# Patient Record
Sex: FEMALE | Race: WHITE | NOT HISPANIC OR LATINO | Employment: OTHER | URBAN - METROPOLITAN AREA
[De-identification: names, ages, dates, MRNs, and addresses within clinical notes are randomized per-mention and may not be internally consistent; named-entity substitution may affect disease eponyms.]

---

## 2018-10-10 ENCOUNTER — TRANSFERRED RECORDS (OUTPATIENT)
Dept: HEALTH INFORMATION MANAGEMENT | Facility: CLINIC | Age: 78
End: 2018-10-10

## 2018-10-16 RX ORDER — PRAVASTATIN SODIUM 40 MG
40 TABLET ORAL EVERY EVENING
COMMUNITY

## 2018-10-16 RX ORDER — FAMOTIDINE 20 MG
1000 TABLET ORAL DAILY
COMMUNITY

## 2018-10-16 RX ORDER — OMEGA-3/DHA/EPA/FISH OIL 60 MG-90MG
500 CAPSULE ORAL 2 TIMES DAILY
COMMUNITY

## 2018-10-16 RX ORDER — TRIAMCINOLONE ACETONIDE 1 MG/G
CREAM TOPICAL 3 TIMES DAILY PRN
COMMUNITY

## 2018-10-16 RX ORDER — LOSARTAN POTASSIUM AND HYDROCHLOROTHIAZIDE 25; 100 MG/1; MG/1
1 TABLET ORAL DAILY
COMMUNITY
End: 2024-03-04

## 2018-10-19 ENCOUNTER — ANESTHESIA EVENT (OUTPATIENT)
Dept: SURGERY | Facility: CLINIC | Age: 78
End: 2018-10-19
Payer: COMMERCIAL

## 2018-10-19 ENCOUNTER — HOSPITAL ENCOUNTER (OUTPATIENT)
Facility: CLINIC | Age: 78
Discharge: HOME | End: 2018-10-20
Attending: UROLOGY | Admitting: UROLOGY
Payer: COMMERCIAL

## 2018-10-19 ENCOUNTER — ANESTHESIA (OUTPATIENT)
Dept: SURGERY | Facility: CLINIC | Age: 78
End: 2018-10-19
Payer: COMMERCIAL

## 2018-10-19 DIAGNOSIS — N81.4 CYSTOCELE WITH PROLAPSE: Primary | ICD-10-CM

## 2018-10-19 PROBLEM — Z00.00 MEDICARE ANNUAL WELLNESS VISIT, SUBSEQUENT: Status: ACTIVE | Noted: 2017-12-21

## 2018-10-19 PROBLEM — Z12.31 SCREENING MAMMOGRAM, ENCOUNTER FOR: Status: ACTIVE | Noted: 2017-12-21

## 2018-10-19 PROBLEM — G47.33 OBSTRUCTIVE SLEEP APNEA SYNDROME: Status: ACTIVE | Noted: 2018-10-10

## 2018-10-19 PROBLEM — Z78.0 POSTMENOPAUSAL: Status: ACTIVE | Noted: 2017-12-21

## 2018-10-19 LAB
CREAT SERPL-MCNC: 0.94 MG/DL (ref 0.52–1.04)
GFR SERPL CREATININE-BSD FRML MDRD: 58 ML/MIN/1.7M2
GLUCOSE BLDC GLUCOMTR-MCNC: 117 MG/DL (ref 70–99)
GLUCOSE BLDC GLUCOMTR-MCNC: 123 MG/DL (ref 70–99)
GLUCOSE SERPL-MCNC: 109 MG/DL (ref 70–99)
POTASSIUM SERPL-SCNC: 3.7 MMOL/L (ref 3.4–5.3)

## 2018-10-19 PROCEDURE — 25000132 ZZH RX MED GY IP 250 OP 250 PS 637: Performed by: UROLOGY

## 2018-10-19 PROCEDURE — 84132 ASSAY OF SERUM POTASSIUM: CPT | Performed by: ANESTHESIOLOGY

## 2018-10-19 PROCEDURE — 71000012 ZZH RECOVERY PHASE 1 LEVEL 1 FIRST HR: Performed by: OBSTETRICS & GYNECOLOGY

## 2018-10-19 PROCEDURE — 37000009 ZZH ANESTHESIA TECHNICAL FEE, EACH ADDTL 15 MIN: Performed by: OBSTETRICS & GYNECOLOGY

## 2018-10-19 PROCEDURE — 36000087 ZZH SURGERY LEVEL 8 EA 15 ADDTL MIN: Performed by: OBSTETRICS & GYNECOLOGY

## 2018-10-19 PROCEDURE — 25800025 ZZH RX 258: Performed by: UROLOGY

## 2018-10-19 PROCEDURE — 71000013 ZZH RECOVERY PHASE 1 LEVEL 1 EA ADDTL HR: Performed by: OBSTETRICS & GYNECOLOGY

## 2018-10-19 PROCEDURE — 99207 ZZC CDG-HISTORY COMP: MEETS EXP. PROBLEM FOCUSED - DOWN CODED LACK OF HPI: CPT | Performed by: PHYSICIAN ASSISTANT

## 2018-10-19 PROCEDURE — 25000125 ZZHC RX 250: Performed by: UROLOGY

## 2018-10-19 PROCEDURE — 88307 TISSUE EXAM BY PATHOLOGIST: CPT | Mod: 26 | Performed by: OBSTETRICS & GYNECOLOGY

## 2018-10-19 PROCEDURE — 25000128 H RX IP 250 OP 636: Performed by: ANESTHESIOLOGY

## 2018-10-19 PROCEDURE — 37000008 ZZH ANESTHESIA TECHNICAL FEE, 1ST 30 MIN: Performed by: OBSTETRICS & GYNECOLOGY

## 2018-10-19 PROCEDURE — 93010 ELECTROCARDIOGRAM REPORT: CPT | Performed by: INTERNAL MEDICINE

## 2018-10-19 PROCEDURE — 93005 ELECTROCARDIOGRAM TRACING: CPT

## 2018-10-19 PROCEDURE — 25000125 ZZHC RX 250: Performed by: NURSE ANESTHETIST, CERTIFIED REGISTERED

## 2018-10-19 PROCEDURE — 25000128 H RX IP 250 OP 636: Performed by: NURSE ANESTHETIST, CERTIFIED REGISTERED

## 2018-10-19 PROCEDURE — 36415 COLL VENOUS BLD VENIPUNCTURE: CPT | Performed by: ANESTHESIOLOGY

## 2018-10-19 PROCEDURE — 25000128 H RX IP 250 OP 636: Performed by: UROLOGY

## 2018-10-19 PROCEDURE — 27210794 ZZH OR GENERAL SUPPLY STERILE: Performed by: OBSTETRICS & GYNECOLOGY

## 2018-10-19 PROCEDURE — 82962 GLUCOSE BLOOD TEST: CPT | Mod: 91

## 2018-10-19 PROCEDURE — 25000125 ZZHC RX 250: Performed by: ANESTHESIOLOGY

## 2018-10-19 PROCEDURE — 40000170 ZZH STATISTIC PRE-PROCEDURE ASSESSMENT II: Performed by: OBSTETRICS & GYNECOLOGY

## 2018-10-19 PROCEDURE — 88307 TISSUE EXAM BY PATHOLOGIST: CPT | Performed by: OBSTETRICS & GYNECOLOGY

## 2018-10-19 PROCEDURE — 40000065 ZZH STATISTIC EKG NON-CHARGEABLE

## 2018-10-19 PROCEDURE — 36000085 ZZH SURGERY LEVEL 8 1ST 30 MIN: Performed by: OBSTETRICS & GYNECOLOGY

## 2018-10-19 PROCEDURE — 25000132 ZZH RX MED GY IP 250 OP 250 PS 637: Performed by: PHYSICIAN ASSISTANT

## 2018-10-19 PROCEDURE — 99202 OFFICE O/P NEW SF 15 MIN: CPT | Performed by: PHYSICIAN ASSISTANT

## 2018-10-19 PROCEDURE — P9041 ALBUMIN (HUMAN),5%, 50ML: HCPCS | Performed by: NURSE ANESTHETIST, CERTIFIED REGISTERED

## 2018-10-19 PROCEDURE — 25800025 ZZH RX 258: Performed by: OBSTETRICS & GYNECOLOGY

## 2018-10-19 PROCEDURE — 82947 ASSAY GLUCOSE BLOOD QUANT: CPT | Mod: 91 | Performed by: ANESTHESIOLOGY

## 2018-10-19 PROCEDURE — C1781 MESH (IMPLANTABLE): HCPCS | Performed by: OBSTETRICS & GYNECOLOGY

## 2018-10-19 PROCEDURE — 82565 ASSAY OF CREATININE: CPT | Performed by: ANESTHESIOLOGY

## 2018-10-19 PROCEDURE — 25000566 ZZH SEVOFLURANE, EA 15 MIN: Performed by: OBSTETRICS & GYNECOLOGY

## 2018-10-19 PROCEDURE — 25000125 ZZHC RX 250: Performed by: OBSTETRICS & GYNECOLOGY

## 2018-10-19 PROCEDURE — 99207 ZZC CDG-CODE CATEGORY CHANGED: CPT | Performed by: PHYSICIAN ASSISTANT

## 2018-10-19 PROCEDURE — C1771 REP DEV, URINARY, W/SLING: HCPCS | Performed by: OBSTETRICS & GYNECOLOGY

## 2018-10-19 DEVICE — MESH SLING Y SHAPE RESTORELLE 24X4CM 501420: Type: IMPLANTABLE DEVICE | Site: PELVIS | Status: FUNCTIONAL

## 2018-10-19 DEVICE — MESH SLING OBTRYX-HALO M0068505000: Type: IMPLANTABLE DEVICE | Site: URETHRA | Status: FUNCTIONAL

## 2018-10-19 RX ORDER — LIDOCAINE 40 MG/G
CREAM TOPICAL
Status: DISCONTINUED | OUTPATIENT
Start: 2018-10-19 | End: 2018-10-20 | Stop reason: HOSPADM

## 2018-10-19 RX ORDER — CEFAZOLIN SODIUM 2 G/100ML
2 INJECTION, SOLUTION INTRAVENOUS
Status: DISCONTINUED | OUTPATIENT
Start: 2018-10-19 | End: 2018-10-19 | Stop reason: HOSPADM

## 2018-10-19 RX ORDER — FENTANYL CITRATE 50 UG/ML
INJECTION, SOLUTION INTRAMUSCULAR; INTRAVENOUS PRN
Status: DISCONTINUED | OUTPATIENT
Start: 2018-10-19 | End: 2018-10-19

## 2018-10-19 RX ORDER — ALBUTEROL SULFATE 0.83 MG/ML
2.5 SOLUTION RESPIRATORY (INHALATION)
Status: DISCONTINUED | OUTPATIENT
Start: 2018-10-19 | End: 2018-10-19 | Stop reason: HOSPADM

## 2018-10-19 RX ORDER — LIDOCAINE HYDROCHLORIDE 20 MG/ML
INJECTION, SOLUTION INFILTRATION; PERINEURAL PRN
Status: DISCONTINUED | OUTPATIENT
Start: 2018-10-19 | End: 2018-10-19

## 2018-10-19 RX ORDER — MAGNESIUM HYDROXIDE 1200 MG/15ML
LIQUID ORAL PRN
Status: DISCONTINUED | OUTPATIENT
Start: 2018-10-19 | End: 2018-10-19 | Stop reason: HOSPADM

## 2018-10-19 RX ORDER — SODIUM CHLORIDE, SODIUM LACTATE, POTASSIUM CHLORIDE, CALCIUM CHLORIDE 600; 310; 30; 20 MG/100ML; MG/100ML; MG/100ML; MG/100ML
INJECTION, SOLUTION INTRAVENOUS CONTINUOUS
Status: DISCONTINUED | OUTPATIENT
Start: 2018-10-19 | End: 2018-10-19 | Stop reason: HOSPADM

## 2018-10-19 RX ORDER — LOSARTAN POTASSIUM AND HYDROCHLOROTHIAZIDE 25; 100 MG/1; MG/1
1 TABLET ORAL DAILY
Status: DISCONTINUED | OUTPATIENT
Start: 2018-10-19 | End: 2018-10-20 | Stop reason: HOSPADM

## 2018-10-19 RX ORDER — DEXTROSE MONOHYDRATE 25 G/50ML
25-50 INJECTION, SOLUTION INTRAVENOUS
Status: DISCONTINUED | OUTPATIENT
Start: 2018-10-19 | End: 2018-10-20 | Stop reason: HOSPADM

## 2018-10-19 RX ORDER — ONDANSETRON 4 MG/1
4 TABLET, ORALLY DISINTEGRATING ORAL EVERY 6 HOURS PRN
Status: DISCONTINUED | OUTPATIENT
Start: 2018-10-19 | End: 2018-10-20 | Stop reason: HOSPADM

## 2018-10-19 RX ORDER — CIPROFLOXACIN 2 MG/ML
400 INJECTION, SOLUTION INTRAVENOUS EVERY 12 HOURS
Status: DISCONTINUED | OUTPATIENT
Start: 2018-10-19 | End: 2018-10-20 | Stop reason: HOSPADM

## 2018-10-19 RX ORDER — HYDROCODONE BITARTRATE AND ACETAMINOPHEN 5; 325 MG/1; MG/1
1 TABLET ORAL EVERY 4 HOURS PRN
Qty: 20 TABLET | Refills: 0 | Status: SHIPPED | OUTPATIENT
Start: 2018-10-19 | End: 2019-07-23

## 2018-10-19 RX ORDER — LIDOCAINE 40 MG/G
CREAM TOPICAL
Status: DISCONTINUED | OUTPATIENT
Start: 2018-10-19 | End: 2018-10-19 | Stop reason: HOSPADM

## 2018-10-19 RX ORDER — ASPIRIN 81 MG
100 TABLET, DELAYED RELEASE (ENTERIC COATED) ORAL 2 TIMES DAILY
Qty: 60 TABLET | Refills: 0 | Status: SHIPPED | OUTPATIENT
Start: 2018-10-19 | End: 2018-12-18

## 2018-10-19 RX ORDER — NALOXONE HYDROCHLORIDE 0.4 MG/ML
.1-.4 INJECTION, SOLUTION INTRAMUSCULAR; INTRAVENOUS; SUBCUTANEOUS
Status: DISCONTINUED | OUTPATIENT
Start: 2018-10-19 | End: 2018-10-19

## 2018-10-19 RX ORDER — ONDANSETRON 2 MG/ML
4 INJECTION INTRAMUSCULAR; INTRAVENOUS EVERY 6 HOURS PRN
Status: DISCONTINUED | OUTPATIENT
Start: 2018-10-19 | End: 2018-10-20 | Stop reason: HOSPADM

## 2018-10-19 RX ORDER — KETOROLAC TROMETHAMINE 15 MG/ML
15 INJECTION, SOLUTION INTRAMUSCULAR; INTRAVENOUS EVERY 6 HOURS PRN
Status: DISCONTINUED | OUTPATIENT
Start: 2018-10-19 | End: 2018-10-20 | Stop reason: HOSPADM

## 2018-10-19 RX ORDER — GLYCOPYRROLATE 0.2 MG/ML
INJECTION, SOLUTION INTRAMUSCULAR; INTRAVENOUS PRN
Status: DISCONTINUED | OUTPATIENT
Start: 2018-10-19 | End: 2018-10-19

## 2018-10-19 RX ORDER — MEPERIDINE HYDROCHLORIDE 25 MG/ML
12.5 INJECTION INTRAMUSCULAR; INTRAVENOUS; SUBCUTANEOUS EVERY 5 MIN PRN
Status: DISCONTINUED | OUTPATIENT
Start: 2018-10-19 | End: 2018-10-19 | Stop reason: HOSPADM

## 2018-10-19 RX ORDER — ALBUTEROL SULFATE 0.83 MG/ML
2.5 SOLUTION RESPIRATORY (INHALATION) EVERY 4 HOURS PRN
Status: DISCONTINUED | OUTPATIENT
Start: 2018-10-19 | End: 2018-10-19 | Stop reason: HOSPADM

## 2018-10-19 RX ORDER — HYDROMORPHONE HYDROCHLORIDE 1 MG/ML
.3-.5 INJECTION, SOLUTION INTRAMUSCULAR; INTRAVENOUS; SUBCUTANEOUS EVERY 5 MIN PRN
Status: DISCONTINUED | OUTPATIENT
Start: 2018-10-19 | End: 2018-10-19 | Stop reason: HOSPADM

## 2018-10-19 RX ORDER — HYDROCODONE BITARTRATE AND ACETAMINOPHEN 5; 325 MG/1; MG/1
1 TABLET ORAL EVERY 6 HOURS PRN
Status: DISCONTINUED | OUTPATIENT
Start: 2018-10-19 | End: 2018-10-20 | Stop reason: HOSPADM

## 2018-10-19 RX ORDER — BUPIVACAINE HYDROCHLORIDE AND EPINEPHRINE 2.5; 5 MG/ML; UG/ML
INJECTION, SOLUTION EPIDURAL; INFILTRATION; INTRACAUDAL; PERINEURAL PRN
Status: DISCONTINUED | OUTPATIENT
Start: 2018-10-19 | End: 2018-10-19 | Stop reason: HOSPADM

## 2018-10-19 RX ORDER — ONDANSETRON 4 MG/1
4 TABLET, ORALLY DISINTEGRATING ORAL EVERY 30 MIN PRN
Status: DISCONTINUED | OUTPATIENT
Start: 2018-10-19 | End: 2018-10-19 | Stop reason: HOSPADM

## 2018-10-19 RX ORDER — BUPIVACAINE HYDROCHLORIDE 2.5 MG/ML
INJECTION, SOLUTION INFILTRATION; PERINEURAL PRN
Status: DISCONTINUED | OUTPATIENT
Start: 2018-10-19 | End: 2018-10-19 | Stop reason: HOSPADM

## 2018-10-19 RX ORDER — AMLODIPINE BESYLATE 5 MG/1
5 TABLET ORAL DAILY
Status: DISCONTINUED | OUTPATIENT
Start: 2018-10-19 | End: 2018-10-20 | Stop reason: HOSPADM

## 2018-10-19 RX ORDER — ESTRADIOL 0.1 MG/G
CREAM VAGINAL
Qty: 42.5 G | Refills: 0 | Status: SHIPPED | OUTPATIENT
Start: 2018-10-19 | End: 2019-07-23

## 2018-10-19 RX ORDER — METFORMIN HCL 500 MG
500 TABLET, EXTENDED RELEASE 24 HR ORAL DAILY
Status: DISCONTINUED | OUTPATIENT
Start: 2018-10-20 | End: 2018-10-20 | Stop reason: HOSPADM

## 2018-10-19 RX ORDER — KETOROLAC TROMETHAMINE 15 MG/ML
15 INJECTION, SOLUTION INTRAMUSCULAR; INTRAVENOUS
Status: DISCONTINUED | OUTPATIENT
Start: 2018-10-19 | End: 2018-10-19 | Stop reason: HOSPADM

## 2018-10-19 RX ORDER — CEFAZOLIN SODIUM 1 G/3ML
1 INJECTION, POWDER, FOR SOLUTION INTRAMUSCULAR; INTRAVENOUS SEE ADMIN INSTRUCTIONS
Status: DISCONTINUED | OUTPATIENT
Start: 2018-10-19 | End: 2018-10-19

## 2018-10-19 RX ORDER — HYDROMORPHONE HYDROCHLORIDE 1 MG/ML
.3-.5 INJECTION, SOLUTION INTRAMUSCULAR; INTRAVENOUS; SUBCUTANEOUS
Status: DISCONTINUED | OUTPATIENT
Start: 2018-10-19 | End: 2018-10-20 | Stop reason: HOSPADM

## 2018-10-19 RX ORDER — CEFAZOLIN SODIUM 1 G/3ML
1 INJECTION, POWDER, FOR SOLUTION INTRAMUSCULAR; INTRAVENOUS SEE ADMIN INSTRUCTIONS
Status: DISCONTINUED | OUTPATIENT
Start: 2018-10-19 | End: 2018-10-19 | Stop reason: HOSPADM

## 2018-10-19 RX ORDER — CEFAZOLIN SODIUM 2 G/100ML
2 INJECTION, SOLUTION INTRAVENOUS
Status: COMPLETED | OUTPATIENT
Start: 2018-10-19 | End: 2018-10-19

## 2018-10-19 RX ORDER — NEOSTIGMINE METHYLSULFATE 1 MG/ML
VIAL (ML) INJECTION PRN
Status: DISCONTINUED | OUTPATIENT
Start: 2018-10-19 | End: 2018-10-19

## 2018-10-19 RX ORDER — NALOXONE HYDROCHLORIDE 0.4 MG/ML
.1-.4 INJECTION, SOLUTION INTRAMUSCULAR; INTRAVENOUS; SUBCUTANEOUS
Status: DISCONTINUED | OUTPATIENT
Start: 2018-10-19 | End: 2018-10-20 | Stop reason: HOSPADM

## 2018-10-19 RX ORDER — CEFAZOLIN SODIUM 1 G/3ML
1 INJECTION, POWDER, FOR SOLUTION INTRAMUSCULAR; INTRAVENOUS EVERY 8 HOURS
Status: COMPLETED | OUTPATIENT
Start: 2018-10-19 | End: 2018-10-20

## 2018-10-19 RX ORDER — FENTANYL CITRATE 50 UG/ML
25-50 INJECTION, SOLUTION INTRAMUSCULAR; INTRAVENOUS
Status: DISCONTINUED | OUTPATIENT
Start: 2018-10-19 | End: 2018-10-19 | Stop reason: HOSPADM

## 2018-10-19 RX ORDER — ALBUMIN, HUMAN INJ 5% 5 %
SOLUTION INTRAVENOUS CONTINUOUS PRN
Status: DISCONTINUED | OUTPATIENT
Start: 2018-10-19 | End: 2018-10-19

## 2018-10-19 RX ORDER — NICOTINE POLACRILEX 4 MG
15-30 LOZENGE BUCCAL
Status: DISCONTINUED | OUTPATIENT
Start: 2018-10-19 | End: 2018-10-20 | Stop reason: HOSPADM

## 2018-10-19 RX ORDER — PROPOFOL 10 MG/ML
INJECTION, EMULSION INTRAVENOUS CONTINUOUS PRN
Status: DISCONTINUED | OUTPATIENT
Start: 2018-10-19 | End: 2018-10-19

## 2018-10-19 RX ORDER — ONDANSETRON 2 MG/ML
4 INJECTION INTRAMUSCULAR; INTRAVENOUS EVERY 30 MIN PRN
Status: DISCONTINUED | OUTPATIENT
Start: 2018-10-19 | End: 2018-10-19 | Stop reason: HOSPADM

## 2018-10-19 RX ORDER — CIPROFLOXACIN 500 MG/1
500 TABLET, FILM COATED ORAL 2 TIMES DAILY
Qty: 8 TABLET | Refills: 0 | Status: SHIPPED | OUTPATIENT
Start: 2018-10-19 | End: 2018-10-23

## 2018-10-19 RX ORDER — PHENAZOPYRIDINE HYDROCHLORIDE 200 MG/1
200 TABLET, FILM COATED ORAL ONCE
Status: COMPLETED | OUTPATIENT
Start: 2018-10-19 | End: 2018-10-19

## 2018-10-19 RX ORDER — PROPOFOL 10 MG/ML
INJECTION, EMULSION INTRAVENOUS PRN
Status: DISCONTINUED | OUTPATIENT
Start: 2018-10-19 | End: 2018-10-19

## 2018-10-19 RX ORDER — LORAZEPAM 2 MG/ML
.5-1 INJECTION INTRAMUSCULAR
Status: DISCONTINUED | OUTPATIENT
Start: 2018-10-19 | End: 2018-10-19 | Stop reason: HOSPADM

## 2018-10-19 RX ORDER — ONDANSETRON 2 MG/ML
INJECTION INTRAMUSCULAR; INTRAVENOUS PRN
Status: DISCONTINUED | OUTPATIENT
Start: 2018-10-19 | End: 2018-10-19

## 2018-10-19 RX ORDER — SODIUM CHLORIDE 9 MG/ML
INJECTION, SOLUTION INTRAVENOUS CONTINUOUS
Status: DISCONTINUED | OUTPATIENT
Start: 2018-10-19 | End: 2018-10-20 | Stop reason: HOSPADM

## 2018-10-19 RX ORDER — SPIRONOLACTONE 25 MG/1
25 TABLET ORAL DAILY
Status: DISCONTINUED | OUTPATIENT
Start: 2018-10-19 | End: 2018-10-20 | Stop reason: HOSPADM

## 2018-10-19 RX ADMIN — ROCURONIUM BROMIDE 10 MG: 10 INJECTION INTRAVENOUS at 15:17

## 2018-10-19 RX ADMIN — GLYCOPYRROLATE 0.3 MG: 0.2 INJECTION, SOLUTION INTRAMUSCULAR; INTRAVENOUS at 14:09

## 2018-10-19 RX ADMIN — FENTANYL CITRATE 50 MCG: 50 INJECTION, SOLUTION INTRAMUSCULAR; INTRAVENOUS at 13:47

## 2018-10-19 RX ADMIN — MEPERIDINE HYDROCHLORIDE 12.5 MG: 25 INJECTION INTRAMUSCULAR; INTRAVENOUS; SUBCUTANEOUS at 17:25

## 2018-10-19 RX ADMIN — LOSARTAN POTASSIUM AND HYDROCHLOROTHIAZIDE 1 TABLET: 100; 25 TABLET, FILM COATED ORAL at 21:10

## 2018-10-19 RX ADMIN — HYDROMORPHONE HYDROCHLORIDE 0.25 MG: 1 INJECTION, SOLUTION INTRAMUSCULAR; INTRAVENOUS; SUBCUTANEOUS at 15:16

## 2018-10-19 RX ADMIN — PHENYLEPHRINE HYDROCHLORIDE 100 MCG: 10 INJECTION, SOLUTION INTRAMUSCULAR; INTRAVENOUS; SUBCUTANEOUS at 13:45

## 2018-10-19 RX ADMIN — LIDOCAINE HYDROCHLORIDE 80 MG: 20 INJECTION, SOLUTION INFILTRATION; PERINEURAL at 13:18

## 2018-10-19 RX ADMIN — DEXMEDETOMIDINE HYDROCHLORIDE 12 MCG: 100 INJECTION, SOLUTION INTRAVENOUS at 13:53

## 2018-10-19 RX ADMIN — HYDROMORPHONE HYDROCHLORIDE 0.25 MG: 1 INJECTION, SOLUTION INTRAMUSCULAR; INTRAVENOUS; SUBCUTANEOUS at 14:52

## 2018-10-19 RX ADMIN — SODIUM CHLORIDE, POTASSIUM CHLORIDE, SODIUM LACTATE AND CALCIUM CHLORIDE: 600; 310; 30; 20 INJECTION, SOLUTION INTRAVENOUS at 12:30

## 2018-10-19 RX ADMIN — AMLODIPINE BESYLATE 5 MG: 5 TABLET ORAL at 21:11

## 2018-10-19 RX ADMIN — CEFAZOLIN SODIUM 1 G: 2 INJECTION, SOLUTION INTRAVENOUS at 15:25

## 2018-10-19 RX ADMIN — FENTANYL CITRATE 50 MCG: 50 INJECTION, SOLUTION INTRAMUSCULAR; INTRAVENOUS at 13:18

## 2018-10-19 RX ADMIN — LIDOCAINE HYDROCHLORIDE 2 ML: 10 INJECTION, SOLUTION EPIDURAL; INFILTRATION; INTRACAUDAL; PERINEURAL at 12:45

## 2018-10-19 RX ADMIN — ROCURONIUM BROMIDE 20 MG: 10 INJECTION INTRAVENOUS at 13:27

## 2018-10-19 RX ADMIN — PHENYLEPHRINE HYDROCHLORIDE 100 MCG: 10 INJECTION, SOLUTION INTRAMUSCULAR; INTRAVENOUS; SUBCUTANEOUS at 13:31

## 2018-10-19 RX ADMIN — FENTANYL CITRATE 50 MCG: 50 INJECTION, SOLUTION INTRAMUSCULAR; INTRAVENOUS at 13:25

## 2018-10-19 RX ADMIN — SODIUM CHLORIDE: 9 INJECTION, SOLUTION INTRAVENOUS at 19:54

## 2018-10-19 RX ADMIN — ONDANSETRON 4 MG: 2 INJECTION INTRAMUSCULAR; INTRAVENOUS at 16:09

## 2018-10-19 RX ADMIN — Medication 0.5 MG: at 17:55

## 2018-10-19 RX ADMIN — GLYCOPYRROLATE 0.8 MG: 0.2 INJECTION, SOLUTION INTRAMUSCULAR; INTRAVENOUS at 16:36

## 2018-10-19 RX ADMIN — KETOROLAC TROMETHAMINE 15 MG: 15 INJECTION, SOLUTION INTRAMUSCULAR; INTRAVENOUS at 17:11

## 2018-10-19 RX ADMIN — ALBUMIN (HUMAN): 12.5 SOLUTION INTRAVENOUS at 13:25

## 2018-10-19 RX ADMIN — DEXMEDETOMIDINE HYDROCHLORIDE 8 MCG: 100 INJECTION, SOLUTION INTRAVENOUS at 13:49

## 2018-10-19 RX ADMIN — PHENAZOPYRIDINE HYDROCHLORIDE 200 MG: 200 TABLET ORAL at 11:58

## 2018-10-19 RX ADMIN — FENTANYL CITRATE 50 MCG: 50 INJECTION, SOLUTION INTRAMUSCULAR; INTRAVENOUS at 13:53

## 2018-10-19 RX ADMIN — NEOSTIGMINE METHYLSULFATE 5 MG: 1 INJECTION, SOLUTION INTRAVENOUS at 16:36

## 2018-10-19 RX ADMIN — CIPROFLOXACIN 400 MG: 2 INJECTION, SOLUTION INTRAVENOUS at 21:18

## 2018-10-19 RX ADMIN — CEFAZOLIN SODIUM 2 G: 2 INJECTION, SOLUTION INTRAVENOUS at 13:25

## 2018-10-19 RX ADMIN — ROCURONIUM BROMIDE 20 MG: 10 INJECTION INTRAVENOUS at 14:37

## 2018-10-19 RX ADMIN — ROCURONIUM BROMIDE 50 MG: 10 INJECTION INTRAVENOUS at 13:18

## 2018-10-19 RX ADMIN — CEFAZOLIN 1 G: 1 INJECTION, POWDER, FOR SOLUTION INTRAMUSCULAR; INTRAVENOUS at 22:24

## 2018-10-19 RX ADMIN — PROPOFOL 150 MG: 10 INJECTION, EMULSION INTRAVENOUS at 13:18

## 2018-10-19 RX ADMIN — Medication 0.5 MG: at 18:05

## 2018-10-19 RX ADMIN — PROPOFOL 25 MCG/KG/MIN: 10 INJECTION, EMULSION INTRAVENOUS at 13:26

## 2018-10-19 RX ADMIN — SODIUM CHLORIDE, POTASSIUM CHLORIDE, SODIUM LACTATE AND CALCIUM CHLORIDE: 600; 310; 30; 20 INJECTION, SOLUTION INTRAVENOUS at 14:49

## 2018-10-19 ASSESSMENT — COPD QUESTIONNAIRES
CAT_SEVERITY: MILD
COPD: 1

## 2018-10-19 ASSESSMENT — ENCOUNTER SYMPTOMS
SEIZURES: 0
DYSRHYTHMIAS: 0

## 2018-10-19 ASSESSMENT — LIFESTYLE VARIABLES: TOBACCO_USE: 1

## 2018-10-19 NOTE — BRIEF OP NOTE
[facility] Brief Operative Note    Pre-operative diagnosis: URINARY incontinence (stress) , CYSTOCELE grade 3, GENITAL PROLAPSE ; UTERINE FIBROIDS    Post-operative diagnosis same    Procedure: Procedure(s):  LAPAROSCOPY, LAPAROSCOPIC SUPRACERVICAL HYSTERECTOMY BILATERAL SALPINGO--OOPHORECTOMY (REYES) DAVINCI SACROCOLPOPEXY, MIDURETHRAL SLING, CYSTOSCOPY (ANDREW)  DAVINCI SACROCOLPOPEXY CYSTOSCOPY AND MIDURETHRAL SLING (ANDREW)    Surgeon: Pool Aldana MD   Assistants(s): Sanjana deshpande    Estimated blood loss: Less than 10 ml    Specimens: Uterus, levi fallopian tubes and levi ovaries    Findings:

## 2018-10-19 NOTE — OP NOTE
Laparoscopy  Supracervical hysterectomy  Bilateral salpingo-oophorectomy    Kentrell (GYN)  asst Aldana    General anesthesia    Myomatous uterus & bilateral tubes / ovaries to pathology    EBL ~5cc    No complications    (this portion of surgery completed ~40 minutes ago)    Kentrell GRANADO

## 2018-10-19 NOTE — PROGRESS NOTES
GYN preoperative note    78 year old para 2  History of two term vaginal deliveries    Menopausal since her early 50s  On HRT, but not recent  No recent vaginal bleeding    Pelvic US August 2018 demonstrated multiple small fibroids, along with a thickened (7.4mm) & heterogeneous endometrium    Endometrial biopsy (in my office) Sept 2018 = benign (polyp); pap was normal too    Pt now presents for surgery; my portion = LSC with supracervical hysterectomy & bilateral salpingo-oophorectomy    Pt aware of surgical risks, including but not limited to   Bleeding requiring transfusion   Infection   Injury to involved / adjacent organs    Kentrell GRANADO

## 2018-10-19 NOTE — ANESTHESIA PREPROCEDURE EVALUATION
Anesthesia Evaluation     . Pt has had prior anesthetic.     No history of anesthetic complications          ROS/MED HX    ENT/Pulmonary:     (+)sleep apnea, tobacco use, Past use mild COPD, uses CPAP , . .    Neurologic:      (-) seizures, CVA and TIA   Cardiovascular:     (+) Dyslipidemia, hypertension----. : . . . :. . Previous cardiac testing date:results:Stress Testdate:10/2018 results:Negative stress TTE - EF 65-70%, valves WNL date: results: date: results:         (-) CAD, CHF and arrhythmias   METS/Exercise Tolerance:     Hematologic:         Musculoskeletal:         GI/Hepatic:     (+) GERD Asymptomatic on medication,      (-) liver disease   Renal/Genitourinary:      (-) renal disease   Endo: Comment: BMI 30    (+) type II DM Obesity, .   (-) Type I DM   Psychiatric:         Infectious Disease:         Malignancy:         Other:                     Physical Exam  Normal systems: dental    Airway   Mallampati: III  TM distance: >3 FB  Neck ROM: full    Dental     Cardiovascular   Rhythm and rate: regular      Pulmonary    breath sounds clear to auscultation                    Anesthesia Plan      History & Physical Review  History and physical reviewed and following examination; no interval change.    ASA Status:  2 .    NPO Status:  > 8 hours    Plan for General and ETT with Intravenous and Propofol induction. Maintenance will be Balanced.    PONV prophylaxis:  Ondansetron (or other 5HT-3)  Low dose propofol infusion for PONV prophylaxis (20-30 mcg/kg/min)        Postoperative Care  Postoperative pain management:  Multi-modal analgesia.      Consents  Anesthetic plan, risks, benefits and alternatives discussed with:  Patient..                          .

## 2018-10-19 NOTE — PROGRESS NOTES
Pt arrived on the unit this time. Denies any needs at this moment. Has [id number], CPAP machine, bag of belongings, and on CAPNO. Resource RN- settled pt. Will continue to monitor.

## 2018-10-19 NOTE — IP AVS SNAPSHOT
MRN:[id number]                      After Visit Summary   10/19/2018    Kelle Abdalla    MRN: [id number]           Thank you!     Thank you for choosing [facility] for your care. Our goal is always to provide you with excellent care. Hearing back from our patients is one way we can continue to improve our services. Please take a few minutes to complete the written survey that you may receive in the mail after you visit with us. Thank you!        Patient Information     Date Of Birth          1940        Designated Caregiver       Most Recent Value    Caregiver    Will someone help with your care after discharge? yes    Name of designated caregiver elaina    Phone number of caregiver     Caregiver address [place]      About your hospital stay     You were admitted on:  October 19, 2018 [facility]    You were discharged on:  October 20, 2018        Reason for your hospital stay       Cystocele                  Who to Call     For medical emergencies, please call 911.  For non-urgent questions about your medical care, please call your primary care provider or clinic, [phone]  For questions related to your surgery, please call your surgery clinic        Attending Provider     Provider Specialty    Pool Aldana MD Urology       Primary Care Provider Office Phone # Fax #    Elizabeth Díaz [phone]      After Care Instructions     Discharge Instructions       You are being sent home with estrace cream for the vagina. You should put a small, pea-sized amount on your fingertip and apply inside your vagina once daily at bedtime for a month to aid in healing from surgery. Like when a person applies lotion to their skin, there is no specific technique required for application, you just need to apply the cream to the inside of the vagina every night for 1 month. The cream has hormones in it that  "help nourish the vaginal tissue so that it will heal well.                  Follow-up Appointments     Follow-up and recommended labs and tests        F/u with DR. aldana in one month, call [phone] to schedule                  Pending Results     Date and Time Order Name Status Description    10/19/2018 1141 EKG 12-lead, tracing only Preliminary             Admission Information     Date & Time Provider Department Dept. Phone    10/19/2018 Pool Aldana MD [facility] [phone]      Your Vitals Were     Blood Pressure Temperature Respirations Pulse Oximetry          127/60 (BP Location: Right arm) 99  F (37.2  C) (Oral) 16 93%        MyChart Information     [facility] lets you send messages to your doctor, view your test results, renew your prescriptions, schedule appointments and more. To sign up, go to [email or web address] . Click on \"Log in\" on the left side of the screen, which will take you to the Welcome page. Then click on \"Sign up Now\" on the right side of the page.     You will be asked to enter the access code listed below, as well as some personal information. Please follow the directions to create your username and password.     Your access code is: [email or web address]  Expires: 2019  2:40 PM     Your access code will  in 90 days. If you need help or a new code, please call your [place] clinic or [phone].        Care EveryWhere ID     This is your Care EveryWhere ID. This could be used by other organizations to access your [place] medical records  [id number]        Equal Access to Services     [facility]: Hadii prabhakar lorenzoo Soraz, waaxda luqadaha, qaybta kaalmada dao, eb saenz . So [facility] [phone].    ATENCIÓN: Si habla español, tiene a coker disposición servicios gratuitos de asistencia lingüística. Llame al [phone].    We comply with applicable federal civil rights laws and [place] laws. We do not " discriminate on the basis of race, color, national origin, age, disability, sex, sexual orientation, or gender identity.               Review of your medicines      START taking        Dose / Directions    ciprofloxacin 500 MG tablet   Commonly known as:  CIPRO        Dose:  500 mg   Take 1 tablet (500 mg) by mouth 2 times daily for 4 days   Quantity:  8 tablet   Refills:  0       docusate sodium 100 MG tablet   Commonly known as:  COLACE        Dose:  100 mg   Take 100 mg by mouth 2 times daily   Quantity:  60 tablet   Refills:  0       estradiol 0.1 MG/GM cream   Commonly known as:  ESTRACE        Pea size on a finger to the vagina Q hs for one month   Quantity:  42.5 g   Refills:  0       HYDROcodone-acetaminophen 5-325 MG per tablet   Commonly known as:  NORCO        Dose:  1 tablet   Take 1 tablet by mouth every 4 hours as needed for pain   Quantity:  20 tablet   Refills:  0         CONTINUE these medicines which may have CHANGED, or have new prescriptions. If we are uncertain of the size of tablets/capsules you have at home, strength may be listed as something that might have changed.        Dose / Directions    aspirin 81 MG EC tablet   This may have changed:  These instructions start on 10/25/2018. If you are unsure what to do until then, ask your doctor or other care provider.   Notes to Patient:  Continue as before prior to admission unless changed by a provider.        Dose:  81 mg   Start taking on:  10/25/2018   Take 1 tablet (81 mg) by mouth daily   Quantity:  30 tablet   Refills:  0         CONTINUE these medicines which have NOT CHANGED        Dose / Directions    FISH OIL PO   Notes to Patient:  Continue as before prior to admission unless changed by a provider.        Dose:  1000 mg   Take 1,000 mg by mouth daily   Refills:  0       GLUCOPHAGE XR PO   Notes to Patient:  Continue as before prior to admission unless changed by a provider.        Dose:  500 mg   Take 500 mg by mouth daily   Refills:   0       losartan-hydrochlorothiazide 100-25 MG per tablet   Commonly known as:  HYZAAR        Dose:  1 tablet   Take 1 tablet by mouth daily   Refills:  0       NORVASC PO        Dose:  5 mg   Take 5 mg by mouth daily   Refills:  0       PRAVASTATIN SODIUM PO   Notes to Patient:  Continue as before prior to admission unless changed by a provider.        Dose:  40 mg   Take 40 mg by mouth every evening   Refills:  0       PRILOSEC PO   Notes to Patient:  Continue as before prior to admission unless changed by a provider.        Dose:  20 mg   Take 20 mg by mouth daily   Refills:  0       SPIRONOLACTONE PO   Notes to Patient:  Continue as before prior to admission unless changed by a provider.        Dose:  25 mg   Take 25 mg by mouth daily   Refills:  0       triamcinolone 0.1 % cream   Commonly known as:  KENALOG        Apply topically daily as needed   Refills:  0       VITAMIN D (CHOLECALCIFEROL) PO   Notes to Patient:  Continue as before prior to admission unless changed by a provider.        Dose:  2000 Units   Take 2,000 Units by mouth daily   Refills:  0       WOMENS MULTIVITAMIN PO   Notes to Patient:  Continue as before prior to admission unless changed by a provider.        Dose:  1 tablet   Take 1 tablet by mouth daily   Refills:  0         STOP taking     DITROPAN PO                Where to get your medicines      These medications were sent to [facility] - [address]  [address] [place] [address] [address]     Phone:  [phone]     ciprofloxacin 500 MG tablet    docusate sodium 100 MG tablet         Some of these will need a paper prescription and others can be bought over the counter. Ask your nurse if you have questions.     Bring a paper prescription for each of these medications     estradiol 0.1 MG/GM cream    HYDROcodone-acetaminophen 5-325 MG per tablet       You don't need a prescription for these medications     aspirin 81 MG EC tablet                 Protect others around you: Learn how to safely use, store and throw away your medicines at www.disposemymeds.org.        ANTIBIOTIC INSTRUCTION     You've Been Prescribed an Antibiotic - Now What?  Your healthcare team thinks that you or your loved one might have an infection. Some infections can be treated with antibiotics, which are powerful, life-saving drugs. Like all medications, antibiotics have side effects and should only be used when necessary. There are some important things you should know about your antibiotic treatment.      Your healthcare team may run tests before you start taking an antibiotic.    Your team may take samples (e.g., from your blood, urine or other areas) to run tests to look for bacteria. These test can be important to determine if you need an antibiotic at all and, if you do, which antibiotic will work best.      Within a few days, your healthcare team might change or even stop your antibiotic.    Your team may start you on an antibiotic while they are working to find out what is making you sick.    Your team might change your antibiotic because test results show that a different antibiotic would be better to treat your infection.    In some cases, once your team has more information, they learn that you do not need an antibiotic at all. They may find out that you don't have an infection, or that the antibiotic you're taking won't work against your infection. For example, an infection caused by a virus can't be treated with antibiotics. Staying on an antibiotic when you don't need it is more likely to be harmful than helpful.      You may experience side effects from your antibiotic.    Like all medications, antibiotics have side effects. Some of these can be serious.    Let you healthcare team know if you have any known allergies when you are admitted to the hospital.    One significant side effect of nearly all antibiotics is the risk of severe and sometimes deadly diarrhea caused  by Clostridium difficile (C. Difficile). This occurs when a person takes antibiotics because some good germs are destroyed. Antibiotic use allows C. diificile to take over, putting patients at high risk for this serious infection.    As a patient or caregiver, it is important to understand your or your loved one's antibiotic treatment. It is especially important for caregivers to speak up when patients can't speak for themselves. Here are some important questions to ask your healthcare team.    What infection is this antibiotic treating and how do you know I have that infection?    What side effects might occur from this antibiotic?    How long will I need to take this antibiotic?    Is it safe to take this antibiotic with other medications or supplements (e.g., vitamins) that I am taking?     Are there any special directions I need to know about taking this antibiotic? For example, should I take it with food?    How will I be monitored to know whether my infection is responding to the antibiotic?    What tests may help to make sure the right antibiotic is prescribed for me?      Information provided by:  www.cdc.gov/getsmart  U.S. Department of Health and Human Services  Centers for disease Control and Prevention  National Center for Emerging and Zoonotic Infectious Diseases  Division of Healthcare Quality Promotion        Information about OPIOIDS     PRESCRIPTION OPIOIDS: WHAT YOU NEED TO KNOW   We gave you an opioid (narcotic) pain medicine. It is important to manage your pain, but opioids are not always the best choice. You should first try all the other options your care team gave you. Take this medicine for as short a time (and as few doses) as possible.    Some activities can increase your pain, such as bandage changes or therapy sessions. It may help to take your pain medicine 30 to 60 minutes before these activities. Reduce your stress by getting enough sleep, working on hobbies you enjoy and practicing  relaxation or meditation. Talk to your care team about ways to manage your pain beyond prescription opioids.    These medicines have risks:    DO NOT drive when on new or higher doses of pain medicine. These medicines can affect your alertness and reaction times, and you could be arrested for driving under the influence (DUI). If you need to use opioids long-term, talk to your care team about driving.    DO NOT operate heavy machinery    DO NOT do any other dangerous activities while taking these medicines.    DO NOT drink any alcohol while taking these medicines.     If the opioid prescribed includes acetaminophen, DO NOT take with any other medicines that contain acetaminophen. Read all labels carefully. Look for the word  acetaminophen  or  Tylenol.  Ask your pharmacist if you have questions or are unsure.    You can get addicted to pain medicines, especially if you have a history of addiction (chemical, alcohol or substance dependence). Talk to your care team about ways to reduce this risk.    All opioids tend to cause constipation. Drink plenty of water and eat foods that have a lot of fiber, such as fruits, vegetables, prune juice, apple juice and high-fiber cereal. Take a laxative (Miralax, milk of magnesia, Colace, Senna) if you don t move your bowels at least every other day. Other side effects include upset stomach, sleepiness, dizziness, throwing up, tolerance (needing more of the medicine to have the same effect), physical dependence and slowed breathing.    Store your pills in a secure place, locked if possible. We will not replace any lost or stolen medicine. If you don t finish your medicine, please throw away (dispose) as directed by your pharmacist. The [place] Pollution Control Agency has more information about safe disposal: https://www.pca.[facility].mn.us/living-green/managing-unwanted-medications             Medication List: This is a list of all your medications and when to take them. Check marks  below indicate your daily home schedule. Keep this list as a reference.      Medications           Morning Afternoon Evening Bedtime As Needed    aspirin 81 MG EC tablet   Take 1 tablet (81 mg) by mouth daily   Start taking on:  10/25/2018   Notes to Patient:  Continue as before prior to admission unless changed by a provider.                                   ciprofloxacin 500 MG tablet   Commonly known as:  CIPRO   Take 1 tablet (500 mg) by mouth 2 times daily for 4 days   Next Dose Due:  10/20/18                                      docusate sodium 100 MG tablet   Commonly known as:  COLACE   Take 100 mg by mouth 2 times daily   Next Dose Due:  10/20/18                                      estradiol 0.1 MG/GM cream   Commonly known as:  ESTRACE   Pea size on a finger to the vagina Q hs for one month                                FISH OIL PO   Take 1,000 mg by mouth daily   Notes to Patient:  Continue as before prior to admission unless changed by a provider.                                GLUCOPHAGE XR PO   Take 500 mg by mouth daily   Notes to Patient:  Continue as before prior to admission unless changed by a provider.                                HYDROcodone-acetaminophen 5-325 MG per tablet   Commonly known as:  NORCO   Take 1 tablet by mouth every 4 hours as needed for pain   Last time this was given:  1 tablet on 10/20/2018 11:39 AM                                   losartan-hydrochlorothiazide 100-25 MG per tablet   Commonly known as:  HYZAAR   Take 1 tablet by mouth daily   Last time this was given:  1 tablet on 10/19/2018  9:10 PM   Next Dose Due:  10/20/18                                   NORVASC PO   Take 5 mg by mouth daily   Last time this was given:  5 mg on 10/19/2018  9:11 PM   Next Dose Due:  10/20/18                                   PRAVASTATIN SODIUM PO   Take 40 mg by mouth every evening   Notes to Patient:  Continue as before prior to admission unless changed by a provider.                                    PRILOSEC PO   Take 20 mg by mouth daily   Notes to Patient:  Continue as before prior to admission unless changed by a provider.                                   SPIRONOLACTONE PO   Take 25 mg by mouth daily   Notes to Patient:  Continue as before prior to admission unless changed by a provider.                                   triamcinolone 0.1 % cream   Commonly known as:  KENALOG   Apply topically daily as needed                                   VITAMIN D (CHOLECALCIFEROL) PO   Take 2,000 Units by mouth daily   Notes to Patient:  Continue as before prior to admission unless changed by a provider.                                   WOMENS MULTIVITAMIN PO   Take 1 tablet by mouth daily   Notes to Patient:  Continue as before prior to admission unless changed by a provider.

## 2018-10-19 NOTE — ANESTHESIA POSTPROCEDURE EVALUATION
Patient: Kelle Abdalla    Procedure(s):  LAPAROSCOPY, LAPAROSCOPIC SUPRACERVICAL HYSTERECTOMY BILATERAL SALPINGO--OOPHORECTOMY (REYES) DAVINCI SACROCOLPOPEXY, MIDURETHRAL SLING, CYSTOSCOPY (SITNIKOVA)  DAVINCI SACROCOLPOPEXY CYSTOSCOPY AND MIDURETHRAL SLING (SITNIKOVA)     Diagnosis:URINARY URGENCY, CYSTOCELE, GENITAL PROLAPSE ; UTERINE FIBROIDS   Diagnosis Additional Information: No value filed.    Anesthesia Type:  General, ETT    Note:  Anesthesia Post Evaluation    Patient location during evaluation: Bedside  Patient participation: Able to fully participate in evaluation  Level of consciousness: awake and alert  Pain management: adequate  Airway patency: patent  Cardiovascular status: acceptable  Respiratory status: CPAP (Home CPAP)  Hydration status: acceptable  PONV: none             Last vitals:  Vitals:    10/19/18 1700 10/19/18 1710 10/19/18 1720   BP: 126/80 120/64    Resp: 14 14 10   Temp:  36.4  C (97.6  F)    SpO2: 98% 98% 92%         Electronically Signed By: Harshil Stovall MD  October 19, 2018  5:28 PM

## 2018-10-19 NOTE — IP AVS SNAPSHOT
[facility]    [address] [place] [address]    Phone:  [phone]                                       After Visit Summary   10/19/2018    Kelle Abdalla    MRN: [id number]           After Visit Summary Signature Page     I have received my discharge instructions, and my questions have been answered. I have discussed any challenges I see with this plan with the nurse or doctor.    ..........................................................................................................................................  Patient/Patient Representative Signature      ..........................................................................................................................................  Patient Representative Print Name and Relationship to Patient    ..................................................               ................................................  Date                                   Time    ..........................................................................................................................................  Reviewed by Signature/Title    ...................................................              ..............................................  Date                                               Time          22EPIC Rev 08/18

## 2018-10-19 NOTE — ANESTHESIA CARE TRANSFER NOTE
Patient: Kelle Abdalla    Procedure(s):  LAPAROSCOPY, LAPAROSCOPIC SUPRACERVICAL HYSTERECTOMY BILATERAL SALPINGO--OOPHORECTOMY (REYES) DAVINCI SACROCOLPOPEXY, MIDURETHRAL SLING, CYSTOSCOPY (SITNIKOVA)  DAVINCI SACROCOLPOPEXY CYSTOSCOPY AND MIDURETHRAL SLING (SITNIKOVA)     Diagnosis: URINARY URGENCY, CYSTOCELE, GENITAL PROLAPSE ; UTERINE FIBROIDS   Diagnosis Additional Information: No value filed.    Anesthesia Type:   General, ETT     Note:  Airway :Face Mask  Patient transferred to:PACU  Comments: Pt to PACU. VSS. Report complete to RNHandoff Report: Identifed the Patient, Identified the Reponsible Provider, Reviewed the pertinent medical history, Discussed the surgical course, Reviewed Intra-OP anesthesia mangement and issues during anesthesia, Set expectations for post-procedure period and Allowed opportunity for questions and acknowledgement of understanding      Vitals: (Last set prior to Anesthesia Care Transfer)    CRNA VITALS  10/19/2018 1617 - 10/19/2018 1654      10/19/2018             NIBP: 146/74    Pulse: 79    NIBP Mean: 104    SpO2: 97 %    Resp Rate (observed): 8                Electronically Signed By: Aretha Díaz CRNA, APRN MADELINE  October 19, 2018  4:54 PM

## 2018-10-19 NOTE — PROGRESS NOTES
Admission medication history interview status for the 10/19/2018  admission is complete. See EPIC admission navigator for prior to admission medications     Medication history source reliability:Good    Medication history interview source(s):Patient    Medication history resources (including written lists, pill bottles, clinic record):Patient mailed in her medication list in prior to surgery    Primary pharmacy.[facility]    Additional medication history information not noted on PTA med list :None    Time spent in this activity: 40 minutes    Prior to Admission medications    Medication Sig Last Dose Taking? Auth Provider   AmLODIPine Besylate (NORVASC PO) Take 5 mg by mouth daily 10/18/2018 at AM Yes Reported, Patient   ASPIRIN EC PO Take 81 mg by mouth daily 10/5/2018 at AM Yes Reported, Patient   losartan-hydrochlorothiazide (HYZAAR) 100-25 MG per tablet Take 1 tablet by mouth daily 10/18/2018 at AM Yes Reported, Patient   MetFORMIN HCl (GLUCOPHAGE XR PO) Take 500 mg by mouth daily 10/18/2018 at AM Yes Reported, Patient   Multiple Vitamins-Minerals (WOMENS MULTIVITAMIN PO) Take 1 tablet by mouth daily 10/18/2018 at AM Yes Reported, Patient   Omega-3 Fatty Acids (FISH OIL PO) Take 1,000 mg by mouth daily 10/14/2018 at AM Yes Reported, Patient   Omeprazole (PRILOSEC PO) Take 20 mg by mouth daily 10/18/2018 at AM Yes Reported, Patient   Oxybutynin Chloride (DITROPAN PO) Take 5 mg by mouth 2 times daily (RX states take 2 tablets (10 mg) Twice daily.  Patient states takes 1 tablet twice daily.) 10/18/2018 at PM Yes Reported, Patient   PRAVASTATIN SODIUM PO Take 40 mg by mouth every evening 10/18/2018 at PM Yes Reported, Patient   SPIRONOLACTONE PO Take 25 mg by mouth daily 10/18/2018 at AM Yes Reported, Patient   triamcinolone (KENALOG) 0.1 % cream Apply topically daily as needed  Past Week at PRN Yes Reported, Patient   VITAMIN D, CHOLECALCIFEROL, PO Take 2,000 Units by mouth daily 10/18/2018 at AM Yes Reported,  Patient

## 2018-10-20 VITALS
DIASTOLIC BLOOD PRESSURE: 60 MMHG | RESPIRATION RATE: 16 BRPM | TEMPERATURE: 99 F | OXYGEN SATURATION: 93 % | SYSTOLIC BLOOD PRESSURE: 127 MMHG

## 2018-10-20 LAB
GLUCOSE BLDC GLUCOMTR-MCNC: 116 MG/DL (ref 70–99)
GLUCOSE BLDC GLUCOMTR-MCNC: 137 MG/DL (ref 70–99)
GLUCOSE BLDC GLUCOMTR-MCNC: 88 MG/DL (ref 70–99)
GLUCOSE BLDC GLUCOMTR-MCNC: 91 MG/DL (ref 70–99)

## 2018-10-20 PROCEDURE — 25000132 ZZH RX MED GY IP 250 OP 250 PS 637: Performed by: UROLOGY

## 2018-10-20 PROCEDURE — 25000128 H RX IP 250 OP 636: Performed by: UROLOGY

## 2018-10-20 PROCEDURE — 82962 GLUCOSE BLOOD TEST: CPT | Mod: 91

## 2018-10-20 RX ADMIN — SODIUM CHLORIDE: 9 INJECTION, SOLUTION INTRAVENOUS at 07:25

## 2018-10-20 RX ADMIN — HYDROCODONE BITARTRATE AND ACETAMINOPHEN 1 TABLET: 5; 325 TABLET ORAL at 11:39

## 2018-10-20 RX ADMIN — CIPROFLOXACIN 400 MG: 2 INJECTION, SOLUTION INTRAVENOUS at 08:34

## 2018-10-20 RX ADMIN — CEFAZOLIN 1 G: 1 INJECTION, POWDER, FOR SOLUTION INTRAMUSCULAR; INTRAVENOUS at 07:26

## 2018-10-20 NOTE — PROGRESS NOTES
UROLOGY PROGRESS NOTE  Pain well controlled  Tolerating clears    /60 (BP Location: Right arm)  Temp 97.6  F (36.4  C) (Oral)  Resp 18  SpO2 94%    Intake/Output Summary (Last 24 hours) at 10/20/18 0634  Last data filed at 10/20/18 0623   Gross per 24 hour   Intake          2408.33 ml   Output              935 ml   Net          1473.33 ml     Alert and oriented  Breathing unlabored  Abdomen soft, approp tender post op, no rebound or guarding  Incisions clean, dry, intact  Melo draining clear urine  Extremities warm and well perfused, no edema    PLAN:  ADAT  Remove Melo and vaginal packing now -- RN informed  Trial of void this AM, replace Melo if PVRs > 250 ml  Home later, internal medicine to reconcile home meds    Prateek Grossman MD  [facility]  [phone]

## 2018-10-20 NOTE — CONSULTS
[facility]    History and Physical  Hospitalist       Date of Admission:  10/19/2018    Assessment & Plan   Kelle Abdalla is a 78 year old female with past medical history significant for obstructive sleep apnea on CPAP, overactive bladder, hypertension, hyperlipidemia, type 2 diabetes not on insulin, depression and anxiety, and GERD who presents for planned uro-gynecological surgery with Dr. Negro Urology and Dr. Pfeiffer OB/GYN due to uterine fibroids and thickened endometrium along with mild uterine prolapse and cystocele grade 3-4.    Postop day #0 status post Urology/GYN surgery:   Uterine fibroids and cystocele grade 3-4.  - Postop cares per urology and GYN including pain control, antibiotics, and disposition  - Resume ASA 81 mg when ok from surgical perspective  - Wean O2 as able  - ADAT    Hypertension: BPs adequate postoperatively.  - Resume PTA amlodipine 5 mg daily with hold parameters  - Resume Hyzaar 100-25 mg and spironolactone 25 mg tomorrow with hold parameters  - Monitor postop BPs and VS    Non-insulin-dependent type 2 diabetes:  Takes metformin 500 mg daily prior to admission.  No insulin previously. Last HbA1c 6.2% 10/10/18.  - Sliding scale low-dose  - Hypoglycemia protocol  - Check fingersticks 4 times daily    Recent Labs  Lab 10/19/18  2100 10/19/18  1657 10/19/18  1148   GLC  --   --  109*   * 123*  --      Obstructive sleep apnea: Resume prior to admission CPAP nocturnally    History of hyperlipidemia: Hold statin while in observation status, resume at discharge.    Hx Hypercalcemia: Last serum check 11.3, normal PTH and vit D. No calcium supplementation. F/u in outpatient setting. Preop also 11.2.    History of GERD: Hold prior to admission omeprazole while in observation status, resume on discharge.    Hx depression/anxiety: no PTA meds. Monitor.    DVT Prophylaxis: Defer to primary service  Code Status: Full Code    Disposition: Expected discharge per  primary service urology/GYN.    This patient was discussed with Dr. Mahesh White of the Hospitalist Service who agrees with current plans as outlined above.    Jojo Richard PA-C  Hospitalist Physician Assistant  Pager: [phone]      Primary Care Physician   Elizabeth Díaz MD    Chief Complaint   Planned uro/gyn surgery    History is obtained from the patient and electronic health record    History of Present Illness   Kelle Abdalla is a 78 year old female with past medical history significant for obstructive sleep apnea on CPAP, overactive bladder, hypertension, hyperlipidemia, type 2 diabetes not on insulin, depression and anxiety, and GERD who presents for planned uro-gynecological surgery with Dr. Negro Urology and Dr. Pfeiffer OB/GYN due to uterine fibroids and thickened endometrium along with mild uterine prolapse and cystocele grade 3-4.  Postop day #0 status post laparoscopic supracervical hysterectomy and bilateral salpingo-oophorectomy with sacrocolpopexy, miduretheral sling, and cystoscopy due to mild uterine prolapse/cystocele grade 3-4 and uterine fibroids and thickening (neg uterine bx). Minimal blood loss. Gen ETT anesthesia.     Preop H&P reviewed.  Patient with type 2 diabetes however only takes 500 mg of metformin at home and no insulin.  Also history of hypertension taking amlodipine 5 mg daily along with Hyzaar 100-25 mg daily and spironolactone 25 mg daily.  Also takes pravastatin 40 mg by mouth nightly. Recent stress ECHO unremarkable.     During my visit the patient feels well. No chest pain, SOB, N/V, or palpitations. Pain controlled. No c/o.     Past Medical History    I have reviewed this patient's medical history and updated it with pertinent information if needed.   Past Medical History:   Diagnosis Date     Anxiety      Depression      Diabetes (H)     TYPE 2     Gastroesophageal reflux disease      Hyperlipidemia      Hypertension      Overactive bladder      Psoriasis       Sleep apnea      Tubular adenoma        Past Surgical History   I have reviewed this patient's surgical history and updated it with pertinent information if needed.  Past Surgical History:   Procedure Laterality Date     CHOLECYSTECTOMY       GENITOURINARY SURGERY      BLADDER     ORTHOPEDIC SURGERY      BUNIONECTOMY     ORTHOPEDIC SURGERY       ARTHROSCOPY OF KNEE       Prior to Admission Medications   Prior to Admission Medications   Prescriptions Last Dose Informant Patient Reported? Taking?   AmLODIPine Besylate (NORVASC PO) 10/18/2018 at AM Self Yes Yes   Sig: Take 5 mg by mouth daily   MetFORMIN HCl (GLUCOPHAGE XR PO) 10/18/2018 at AM Self Yes Yes   Sig: Take 500 mg by mouth daily   Multiple Vitamins-Minerals (WOMENS MULTIVITAMIN PO) 10/18/2018 at AM Self Yes Yes   Sig: Take 1 tablet by mouth daily   Omega-3 Fatty Acids (FISH OIL PO) 10/14/2018 at AM Self Yes Yes   Sig: Take 1,000 mg by mouth daily   Omeprazole (PRILOSEC PO) 10/18/2018 at AM Self Yes Yes   Sig: Take 20 mg by mouth daily   Oxybutynin Chloride (DITROPAN PO) 10/18/2018 at PM Self Yes No   Sig: Take 5 mg by mouth 2 times daily (RX states take 2 tablets (10 mg) Twice daily.  Patient states takes 1 tablet twice daily.)   PRAVASTATIN SODIUM PO 10/18/2018 at PM Self Yes Yes   Sig: Take 40 mg by mouth every evening   SPIRONOLACTONE PO 10/18/2018 at AM Self Yes Yes   Sig: Take 25 mg by mouth daily   VITAMIN D, CHOLECALCIFEROL, PO 10/18/2018 at AM Self Yes Yes   Sig: Take 2,000 Units by mouth daily   losartan-hydrochlorothiazide (HYZAAR) 100-25 MG per tablet 10/18/2018 at AM Self Yes Yes   Sig: Take 1 tablet by mouth daily   triamcinolone (KENALOG) 0.1 % cream Past Week at PRN Self Yes Yes   Sig: Apply topically daily as needed       Facility-Administered Medications: None     Allergies   Allergies   Allergen Reactions     Atorvastatin GI Disturbance     Pollen Extracts [Pollen Extract]        Social History   I have reviewed this patient's social  history and updated it with pertinent information if needed. Kelle Abdalla  reports that she quit smoking about 41 years ago. She has a 20.00 pack-year smoking history. She has never used smokeless tobacco. She reports that she drinks alcohol. She reports that she does not use illicit drugs.    Family History   I have reviewed this patient's family history and updated it with pertinent information if needed.   History reviewed. No pertinent family history.    Review of Systems   The 10 point Review of Systems is negative other than noted in the HPI or here.     Physical Exam   Temp: 98.9  F (37.2  C) Temp src: Temporal BP: 118/51   Heart Rate: 71 Resp: 16 SpO2: 94 % O2 Device: Nasal cannula Oxygen Delivery: 4 LPM  Vital Signs with Ranges  /51  Temp 98.9  F (37.2  C) (Temporal)  Resp 16  SpO2 94%  Temp:  [96.5  F (35.8  C)-98.9  F (37.2  C)] 98.9  F (37.2  C)  Heart Rate:  [53-76] 71  Resp:  [9-18] 16  BP: (104-138)/(51-91) 118/51  SpO2:  [91 %-98 %] 94 %  0 lbs 0 oz    General: Awake, alert, female who appears stated age. Looks comfortable laying in bed, denies pain.  HEENT: Normocephalic, atraumatic. Extraocular movements intact. Pupils equal round and reactive to light bilaterally. Oropharynx clear without exudates.   Respiratory: Clear to auscultation bilaterally, no crackles or wheezing. 2.5L NC in place.  Cardiovascular: Regular rate and rhythm, +S1 and S2 without murmur, gallops, or rubs. No peripheral edema. Dorsalis pedis pulses palpable bilaterally.  Gastrointestinal: Soft, non-tender, non-distended. Normoactive bowel sounds x4 quadrants.  Skin: Warm, dry. No rashes, no obvious rashes or lesions on exposed skin.  Musculoskeletal: No joint swelling, erythema or tenderness. Moves all extremities equally.  Neurologic: AAO x3. Cranial nerves 2-12 grossly intact, normal strength and sensation.  Psychiatric: Appropriate mood and affect. No obvious anxiety or depression.    Data   Data reviewed  today:  I personally reviewed no images or EKG's today.    Recent Labs  Lab 10/19/18  1148   POTASSIUM 3.7   *       Imaging:  No results found for this or any previous visit (from the past 24 hour(s)).

## 2018-10-20 NOTE — OP NOTE
Procedure Date: 10/19/2018      DATE OF SURGERY: 10/19/2018      PREOPERATIVE DIAGNOSIS:  Cystocele grade 3, stress incontinence, pelvic organ prolapse      POSTOPERATIVE DIAGNOSIS:  Cystocele grade 3, stress incontinence, pelvic organ prolapse.      PROCEDURES:  Robotically-assisted sacrocolpopexy, mid urethral sling placement and cystoscopy.        SURGEON: Pool Aldana MD       FIRST ASSISTANT: Sanjana Mckenzie CST      COSURGEON:  Dr. Barkley (laparoscopic supracervical hysterectomy, bilateral salpingo-oophorectomy)       ESTIMATED BLOOD LOSS:  10 mL.      COMPLICATIONS:  None.      ANTIBIOTICS:  Preoperatively, she received antibiotics.      INDICATIONS FOR PROCEDURE: Kelle is a 78-year-old female with a history of symptomatic pelvic organ prolapse, i.e. cystocele, stress incontinence and pelvic organ prolapse, who presents for elective procedure.  She understands the risks of the procedure including bleeding, infection, injury, de ceasar urge incontinence, urinary retention, mesh erosion, dyspareunia, and small bowel obstruction.  She would like to proceed.  She also received FDA warnings regarding all vaginally placed meshes.      DESCRIPTION OF PROCEDURE: Kelle was brought to the operating room, placed in supine position.  After excellent induction of general anesthesia, her perineum was prepped and draped in the regular fashion.  Melo catheter and OG tube were placed.  Exam under anesthesia certainly demonstrated significant amount of hemorrhoids as well as partial rectal prolapse.  Dr. Yee was consulted who performed rectal evaluation.  Of note, the patient did not report any obstructive defecative symptoms prior to surgery.  Given that, we decided not to proceed with any formal rectopexy, but concern for future high risk of rectal prolapse in the future was raised.      Midline incision was made above the umbilicus.  Peritoneum was insufflated to a pressure of 15.  Additional three 8 mm  robotic trocars were placed throughout the abdomen and an additional 5 mm AirSeal port was placed in the right abdomen.  The case was turned to Dr. Barkley who proceeded with supracervical hysterectomy and bilateral salpingo-oophorectomy, in which I assisted.  Fallopian tubes and ovaries from both sides were delivered through the port incisions and sent for pathology. The uterus without cervix at the completion of the procedure was bagged in the EndoCatch bag. Please see Dr. Barkley's notes for that part of the procedure.      Once the hysterectomy was complete, I docked the Xi robot after the patient was turned to steep Trendelenburg position.  I started dissection by identifying peritoneum on top of the sacral promontory.  Given significant amount of preperitoneal fat, I did through the dissection.  The sacral was identified and 2-0 Prolene sutures were placed through the anterior longitudinal ligaments x 2.  Peritoneum was opened all the way to the cervix.  Cervical canal was fulgurated using PK.  I dissected anteriorly and posteriorly over anterior and posterior surfaces of the vagina and sutured polypropylene type 1 mesh from [employer] kit (Restorelle) over anterior and posterior surfaces of the vagina.  I used interrupted 2-0 Ethibond x 6 anteriorly and 2-0 PDS x 3 anteriorly over the distal portion of the vagina.  Posteriorly, I also used a combination of interrupted 2-0 Ethibond x 6 and in the very distal portion where the vaginal mucosa was thin, I used interrupted 2-0 PDS.  Once the graft was nicely attached to the cervix, I adjusted tension-free positioning of the graft and sutured that to the already preplaced sutures over the sacrum.  At the completion of the procedure, extra graft was removed and the peritoneum was closed on top of the graft.  By doing that, I completely retroperitonealized the graft.  The specimen, i.e. uterus, was delivered through the midline incision completely in the EndoCatch bag for  pathology.  Midline incision was a little bit extended to accommodate the bag.  At the completion of the procedure, I closed fascia using 2-0 Vicryl interrupted figure-of-eight x 5 sutures, subcutaneous incisions were closed using 2-0 Vicryl subcutaneously and Monocryl, and Dermabond to the skin.      I proceeded with the mid urethral sling placement.  Periurethral space was hydrodistended using Marcaine with epinephrine.  A 1 cm incision was made at the mid urethra. The space was dissected all the way to the pubic rami bilaterally.  I positioned polypropylene type 1 mesh from [employer] by [employer], going through the obturator foramen using outside-in technique.  Once the graft was positioned, I performed cystoscopy that demonstrated no stitch, no mesh in the bladder.  Efflux of urine was coming from both ureteral orifices.  Of note, there was no bladder pathology identified status post TURBT of the benign polyp in the bladder a year ago.  I adjusted tension-free positioning of the graft by placing #8 Hegar dilator between the fold in the urethra and the graft itself.  Anterior vaginal wall was closed using 2-0 Vicryl in a running fashion.  Dermabond was applied to the incisions over entire area.  Vaginal packing was applied and Melo catheter was left in place.  The patient was transferred to the recovery area in stable condition.  The plan as of now would be to give her a voiding trial and discontinue Melo catheter on postoperative day #1.          FALLON MORALES MD             D: 10/19/2018   T: 10/20/2018   MT:       Name:     ANA MARIA HOLLINGSWORTH   MRN:      [id number]        Account:        [id number]   :      1940           Procedure Date: 10/19/2018      Document: [id number]       cc: Edmond Pfeiffer MD       [facility]

## 2018-10-20 NOTE — PLAN OF CARE
Problem: Patient Care Overview  Goal: Plan of Care/Patient Progress Review  Outcome: Improving  A&Ox4. VSS on 2.5L via N/C. Denies pain. CMS intact. Lap sites x5 covered with durabond CDI. Tolerating clears, denies nausea. Bowel sounds active, no gas. IS use encouraged. IV fluids infusing 100/hr. Melo intact, 1 packing in place, moist serosanguineous. Patient dangled at bedside Ax1. Discharge tomorrow pending. Will continue to monitor.

## 2018-10-20 NOTE — PROGRESS NOTES
Gyn progress note    S: Patient has been out of bed, attempted to void but has not been able to yet. No N/V but does not feel hungry, has tolerated water and jello. No pain. +flatus. Scant vaginal spotting    O:  /60 (BP Location: Right arm)  Temp 99  F (37.2  C) (Oral)  Resp 18  SpO2 93%  Gen NAD  Incisions c/d/i with minimal surrounding erythema/ecchymosis  Pelvic deferred      A/P: POD 1 after LRS FREDRICK BSO, sacrocolpopexy and urethral sling  Advance diet to regular  Has decline home meds for Diabetes and CHTN this morning. Normotensive. Resume on discharge home.  Melo and vaginal packing removed. Voiding trial  Anticipate discharge home today  Gyn follow-up with Dr Pfeiffer as scheduled.    Jennifer L. Schwab MD  [facility]  [phone]

## 2018-10-21 NOTE — PLAN OF CARE
Problem: Patient Care Overview  Goal: Plan of Care/Patient Progress Review  Outcome: Adequate for Discharge Date Met: 10/20/18  A&Ox4. VSS.  Weaned off O2, sats at 95% on room air.  CMS intact.  SBA.  Tolerated regular diet, needs encouraging for oral and hydration intake.  PVRs, retention borderline per parameters, see Urology notes.  Patient declines alvarez placement.  Urinary frequency, baseline per pt.  Refuses AM meds d/t possible discharge today.  IV abx infusion given.  Bgm within parameters.  Lap sites c/d/i and AMY.  Scant pink vaginal discharge on diego pad.  AVS and Rx reviewed and given to patient and son.  All questions asked and answered.  Discharged with NA assist via wheelchair with family transport.

## 2018-10-23 LAB
COPATH REPORT: NORMAL
INTERPRETATION ECG - MUSE: NORMAL

## 2018-11-11 NOTE — OP NOTE
Procedure Date: 10/19/2018      This is part 1 of a 2-part procedure, part 2 to be performed by Dr. Aldana.        PREOPERATIVE DIAGNOSES:  Cystocele, stress incontinence and pelvic organ prolapse.      PROCEDURE PERFORMED:  My portion of the procedure is laparoscopy with supracervical hysterectomy and bilateral salpingo-oophorectomy.        SURGEON:  Dr. Pfeiffer      ASSISTANT:  Dr. Aldana      ANESTHESIA:  General anesthesia.        SPECIMEN SENT TO PATHOLOGY:  Include myomatous uterus and bilateral tubes and ovaries.      ESTIMATED BLOOD LOSS:  5 cc       COMPLICATIONS:  There were no intraoperative complications.      PROCEDURE IN DETAIL:  Briefly, our procedure is as follows:  Following the insertion of the robotic ports by Dr. Aldana, we began our laparoscopic supracervical hysterectomy.  The round ligaments on both sides were elevated, cauterized, and cut.  The vesicouterine peritoneum was incised toward the midline from each side.  The utero-ovarian ligaments were cauterized and cut and the bladder was then bluntly dissected off the lower uterus and cervix.  The uterine vasculature was skeletonized.  The uterine vasculature was cauterized and cut.  The uterus was transected from the cervix with the use of the laparoscopic cautery scissors.  The bladder was well clear of this area of dissection.  The uterus was removed from the cervix and to be removed from the patient's abdomen at a later point in the procedure by Dr. Aldana. Hemostasis was assured at the cervical stump with bipolar cautery.      The infundibulopelvic ligaments on both sides were identified, cauterized and cut, and the tubes and ovaries were removed from the patient's abdomen without difficulty.  Irrigation was performed throughout the pelvis.  All pedicles were noted to be hemostatic and this concluded the laparoscopic portion of this procedure, specifically supracervical hysterectomy and bilateral oophorectomy.      Estimated  blood loss 5 mL.         KARSON REYES MD             D: 2018   T: 11/10/2018   MT: CARLO      Name:     ANA MARIA HOLLINGSWORTH   MRN:      [id number]        Account:        [id number]   :      1940           Procedure Date: 10/19/2018      Document: [id number]

## 2019-05-20 ENCOUNTER — THERAPY VISIT (OUTPATIENT)
Dept: PHYSICAL THERAPY | Facility: CLINIC | Age: 79
End: 2019-05-20
Payer: COMMERCIAL

## 2019-05-20 DIAGNOSIS — N39.46 MIXED INCONTINENCE: Primary | ICD-10-CM

## 2019-05-20 DIAGNOSIS — K59.00 CONSTIPATION: ICD-10-CM

## 2019-05-20 PROCEDURE — 97110 THERAPEUTIC EXERCISES: CPT | Mod: GP | Performed by: PHYSICAL THERAPIST

## 2019-05-20 PROCEDURE — 97162 PT EVAL MOD COMPLEX 30 MIN: CPT | Mod: GP | Performed by: PHYSICAL THERAPIST

## 2019-05-20 PROCEDURE — 97112 NEUROMUSCULAR REEDUCATION: CPT | Mod: GP | Performed by: PHYSICAL THERAPIST

## 2019-05-20 PROCEDURE — 97535 SELF CARE MNGMENT TRAINING: CPT | Mod: GP | Performed by: PHYSICAL THERAPIST

## 2019-05-20 NOTE — PROGRESS NOTES
"[facility] Initial Evaluation  Subjective:  Onset of rectocele that was discovered during surgery for bladder lift/hysterectomy in Oct. 2018. Reports MD found a prolapse near rectum during surgery so is the reason for PT referral. Urinary incontinence had been going on \"for years\", primarily urge incontinence, especially during night. These sxs have improved greatly. Referred to MD on 05/06/19 for constipation. Metamucil has been helping 1 x day. Occasional fecal leakage ~ 1x month. Sometimes coffee will stimulate. 1x day, 4-5 on [place] scale, usually feels empty but sometimes an hour later may have to go again. No bloat or gas. Using 1 Poise/5 day. Voiding nighttime 2-3x. Will be having partial knee replacement on June 5th. Works full-time at "[employer] but retiring this summer. Goal is to have less uncertainty of bowels with urge, better emptying.     The history is provided by the patient. No  was used.   Kelle Abdalla is a 78 year old female with a other (constipation) condition.  Condition occurred with:  Insidious onset.  Condition occurred: for unknown reasons.  This is a chronic condition     Patient reports pain:  N/a.              Since onset symptoms are gradually worsening.        General health as reported by patient is good.  Pertinent medical history includes:  Sleep disorder/apnea, incontinence, diabetes and high blood pressure.  Medical allergies: yes.  Other surgeries include:  Other (hysterectomy, bladder repair 10/2018).  Medication history: see EPIC.    Employment status: [employer], retiring soon.                                  Objective:  System                                 Pelvic Dysfunction Evaluation:    Bladder/Pelvic Problems:    Storage Problem:  Urge incontinence and urgency  Emptying Problem:  Incomplete emptying      Diagnostic Tests:    Pelvic Exam:  Yes                Colonoscopy:  Yes      Flexibility:    Tightness present " at:Adductors; Hamstrings and Piriformis    Abdominal Wall:  NA        Pelvic Clock Exam:    Ischiocavernosis pain:  -  Bulbocavernosis pain:  -    Levator ANI:  -      Reflex Testing:  normal    External Assessment:        Bearing Down/Coughing:  Normal  Tissue Symmetry:  Normal  Introitus:  Normal  Muscle Contraction/Perineal Mobility:  Slight lift, no urogential triangle descent  Internal Assessment:      Contraction/Grade:  Weak squeeze, 2 second hold (2)          SEMG Biofeedback:  NA                  Additional History:  Delivery History:  Vaginal delivery  Number of Pregnancies: 2  Number of Live Births: 2  Caffeine Consumption:  0-1                     General     ROS    Assessment/Plan:    Patient is a 78 year old female with pelvic complaints.    Patient has the following significant findings with corresponding treatment plan.                Diagnosis 1:  constipation  Decreased ROM/flexibility - manual therapy and therapeutic exercise  Decreased strength - therapeutic exercise and therapeutic activities  Decreased proprioception - neuro re-education and therapeutic activities  Inflammation - self management/home program  Impaired muscle performance - biofeedback and neuro re-education  Decreased function - therapeutic activities  Impaired posture - neuro re-education    Therapy Evaluation Codes:   1) History comprised of:   Personal factors that impact the plan of care:      Time since onset of symptoms.    Comorbidity factors that impact the plan of care are:      Bowel/bladder changes and hysterectomy/bladder surgery.     Medications impacting care: None.  2) Examination of Body Systems comprised of:   Body structures and functions that impact the plan of care:      Pelvis.   Activity limitations that impact the plan of care are:      Urgency and constipation.  3) Clinical presentation characteristics are:   Evolving/Changing.  4) Decision-Making    Moderate complexity using standardized patient  assessment instrument and/or measureable assessment of functional outcome.  Cumulative Therapy Evaluation is: Moderate complexity.    Previous and current functional limitations:  (See Goal Flow Sheet for this information)    Short term and Long term goals: (See Goal Flow Sheet for this information)     Communication ability:  Patient appears to be able to clearly communicate and understand verbal and written communication and follow directions correctly.  Treatment Explanation - The following has been discussed with the patient:   RX ordered/plan of care  Anticipated outcomes  Possible risks and side effects  This patient would benefit from PT intervention to resume normal activities.   Rehab potential is questionable.    Frequency:  1 X week, once daily  Duration:  for 6 weeks  Discharge Plan:  Achieve all LTG.  Independent in home treatment program.  Reach maximal therapeutic benefit.    Please refer to the daily flowsheet for treatment today, total treatment time and time spent performing 1:1 timed codes.

## 2019-05-20 NOTE — LETTER
"[facility]  [address]  [place] [address]  [phone]    May 23, 2019    Re: Kelle Abdalla   :   1940  MRN:  [id number]   REFERRING PHYSICIAN:   Zenia LAW [facility]    Date of Initial Evaluation:  19  Visits:  Rxs Used: 1  Reason for Referral:     Mixed incontinence  Constipation    [facility] Initial Evaluation  Subjective:  Onset of rectocele that was discovered during surgery for bladder lift/hysterectomy in Oct. 2018. Reports MD found a prolapse near rectum during surgery so is the reason for PT referral. Urinary incontinence had been going on \"for years\", primarily urge incontinence, especially during night. These sxs have improved greatly. Referred to MD on 19 for constipation. Metamucil has been helping 1 x day. Occasional fecal leakage ~ 1x month. Sometimes coffee will stimulate. 1x day, 4-5 on [place] scale, usually feels empty but sometimes an hour later may have to go again. No bloat or gas. Using 1 Poise/5 day. Voiding nighttime 2-3x. Will be having partial knee replacement on . Works full-time at [employer] but retiring this summer. Goal is to have less uncertainty of bowels with urge, better emptying.     The history is provided by the patient. No  was used.   Kelle Abdalla is a 78 year old female with a other (constipation) condition.  Condition occurred with:  Insidious onset.  Condition occurred: for unknown reasons.  This is a chronic condition     Patient reports pain:  N/a.              Since onset symptoms are gradually worsening.        General health as reported by patient is good.  Pertinent medical history includes:  Sleep disorder/apnea, incontinence, diabetes and high blood pressure.  Medical allergies: yes.  Other surgeries include:  Other (hysterectomy, bladder repair 10/2018).  Medication history: see EPIC.    Employment status: [employer], " retiring soon.      Objective:  System     Pelvic Dysfunction Evaluation:    Bladder/Pelvic Problems:    Storage Problem:  Urge incontinence and urgency  Emptying Problem:  Incomplete emptying      Diagnostic Tests:    Pelvic Exam:  Yes    Colonoscopy:  Yes      Flexibility:    Tightness present at:Adductors; Hamstrings and Piriformis      Re: Kelle Abdalla   :   1940    Abdominal Wall:  NA    Pelvic Clock Exam:    Ischiocavernosis pain:  -  Bulbocavernosis pain:  -    Levator ANI:  -    Reflex Testing:  normal    External Assessment:    Bearing Down/Coughing:  Normal  Tissue Symmetry:  Normal  Introitus:  Normal  Muscle Contraction/Perineal Mobility:  Slight lift, no urogential triangle descent  Internal Assessment:    Contraction/Grade:  Weak squeeze, 2 second hold (2)  SEMG Biofeedback:  NA  Additional History:  Delivery History:  Vaginal delivery  Number of Pregnancies: 2  Number of Live Births: 2  Caffeine Consumption:  0-1     General   ROS    Assessment/Plan:    Patient is a 78 year old female with pelvic complaints.    Patient has the following significant findings with corresponding treatment plan.                Diagnosis 1:  constipation  Decreased ROM/flexibility - manual therapy and therapeutic exercise  Decreased strength - therapeutic exercise and therapeutic activities  Decreased proprioception - neuro re-education and therapeutic activities  Inflammation - self management/home program  Impaired muscle performance - biofeedback and neuro re-education  Decreased function - therapeutic activities  Impaired posture - neuro re-education    Therapy Evaluation Codes:   1) History comprised of:   Personal factors that impact the plan of care:      Time since onset of symptoms.    Comorbidity factors that impact the plan of care are:      Bowel/bladder changes and hysterectomy/bladder surgery.     Medications impacting care: None.  2) Examination of Body Systems comprised of:   Body  structures and functions that impact the plan of care:      Pelvis.   Activity limitations that impact the plan of care are:      Urgency and constipation.  3) Clinical presentation characteristics are:   Evolving/Changing.  4) Decision-Making    Moderate complexity using standardized patient assessment instrument and/or measureable assessment of functional outcome.  Cumulative Therapy Evaluation is: Moderate complexity.  Re: Kelle Abdalla   :   1940    Previous and current functional limitations:  (See Goal Flow Sheet for this information)    Short term and Long term goals: (See Goal Flow Sheet for this information)     Communication ability:  Patient appears to be able to clearly communicate and understand verbal and written communication and follow directions correctly.  Treatment Explanation - The following has been discussed with the patient:   RX ordered/plan of care  Anticipated outcomes  Possible risks and side effects  This patient would benefit from PT intervention to resume normal activities.   Rehab potential is questionable.    Frequency:  1 X week, once daily  Duration:  for 6 weeks  Discharge Plan:  Achieve all LTG.  Independent in home treatment program.  Reach maximal therapeutic benefit.    Please refer to the daily flowsheet for treatment today, total treatment time and time spent performing 1:1 timed codes.         Thank you for your referral.    INQUIRIES  Therapist: Karla Cline, MSPT, OCS  [facility]  [address] [address] [address]  Phone: [phone]  Fax: [phone]

## 2019-05-23 PROBLEM — K59.00 CONSTIPATION: Status: ACTIVE | Noted: 2019-05-23

## 2019-05-29 ENCOUNTER — THERAPY VISIT (OUTPATIENT)
Dept: PHYSICAL THERAPY | Facility: CLINIC | Age: 79
End: 2019-05-29
Payer: COMMERCIAL

## 2019-05-29 DIAGNOSIS — K59.00 CONSTIPATION, UNSPECIFIED CONSTIPATION TYPE: ICD-10-CM

## 2019-05-29 DIAGNOSIS — N39.46 MIXED INCONTINENCE: Primary | ICD-10-CM

## 2019-05-29 PROCEDURE — 97535 SELF CARE MNGMENT TRAINING: CPT | Mod: GP | Performed by: PHYSICAL THERAPIST

## 2019-05-29 PROCEDURE — 97110 THERAPEUTIC EXERCISES: CPT | Mod: GP | Performed by: PHYSICAL THERAPIST

## 2019-07-02 ENCOUNTER — THERAPY VISIT (OUTPATIENT)
Dept: PHYSICAL THERAPY | Facility: CLINIC | Age: 79
End: 2019-07-02
Payer: COMMERCIAL

## 2019-07-02 DIAGNOSIS — K59.00 CONSTIPATION, UNSPECIFIED CONSTIPATION TYPE: ICD-10-CM

## 2019-07-02 DIAGNOSIS — N39.46 MIXED INCONTINENCE: Primary | ICD-10-CM

## 2019-07-02 PROCEDURE — 97110 THERAPEUTIC EXERCISES: CPT | Mod: GP | Performed by: PHYSICAL THERAPIST

## 2019-07-02 PROCEDURE — 97535 SELF CARE MNGMENT TRAINING: CPT | Mod: GP | Performed by: PHYSICAL THERAPIST

## 2019-07-10 ENCOUNTER — THERAPY VISIT (OUTPATIENT)
Dept: PHYSICAL THERAPY | Facility: CLINIC | Age: 79
End: 2019-07-10
Payer: COMMERCIAL

## 2019-07-10 DIAGNOSIS — N39.46 MIXED INCONTINENCE: Primary | ICD-10-CM

## 2019-07-10 DIAGNOSIS — K59.00 CONSTIPATION, UNSPECIFIED CONSTIPATION TYPE: ICD-10-CM

## 2019-07-10 PROCEDURE — 97112 NEUROMUSCULAR REEDUCATION: CPT | Mod: GP | Performed by: PHYSICAL THERAPIST

## 2019-07-10 PROCEDURE — 97110 THERAPEUTIC EXERCISES: CPT | Mod: GP | Performed by: PHYSICAL THERAPIST

## 2019-07-18 ENCOUNTER — THERAPY VISIT (OUTPATIENT)
Dept: PHYSICAL THERAPY | Facility: CLINIC | Age: 79
End: 2019-07-18
Payer: COMMERCIAL

## 2019-07-18 DIAGNOSIS — N39.46 MIXED INCONTINENCE: Primary | ICD-10-CM

## 2019-07-18 DIAGNOSIS — K59.00 CONSTIPATION, UNSPECIFIED CONSTIPATION TYPE: ICD-10-CM

## 2019-07-18 PROCEDURE — 97110 THERAPEUTIC EXERCISES: CPT | Mod: GP | Performed by: PHYSICAL THERAPIST

## 2019-07-18 PROCEDURE — 97112 NEUROMUSCULAR REEDUCATION: CPT | Mod: GP | Performed by: PHYSICAL THERAPIST

## 2019-07-23 ENCOUNTER — APPOINTMENT (OUTPATIENT)
Dept: ULTRASOUND IMAGING | Facility: CLINIC | Age: 79
DRG: 176 | End: 2019-07-23
Attending: EMERGENCY MEDICINE
Payer: COMMERCIAL

## 2019-07-23 ENCOUNTER — HOSPITAL ENCOUNTER (INPATIENT)
Facility: CLINIC | Age: 79
LOS: 3 days | Discharge: HOME | DRG: 176 | End: 2019-07-26
Attending: EMERGENCY MEDICINE | Admitting: INTERNAL MEDICINE
Payer: COMMERCIAL

## 2019-07-23 ENCOUNTER — APPOINTMENT (OUTPATIENT)
Dept: CT IMAGING | Facility: CLINIC | Age: 79
DRG: 176 | End: 2019-07-23
Attending: EMERGENCY MEDICINE
Payer: COMMERCIAL

## 2019-07-23 DIAGNOSIS — I26.09 ACUTE PULMONARY EMBOLISM WITH ACUTE COR PULMONALE, UNSPECIFIED PULMONARY EMBOLISM TYPE (H): ICD-10-CM

## 2019-07-23 DIAGNOSIS — I26.09 OTHER ACUTE PULMONARY EMBOLISM WITH ACUTE COR PULMONALE (H): Primary | ICD-10-CM

## 2019-07-23 PROBLEM — I26.99 ACUTE PULMONARY EMBOLISM (H): Status: ACTIVE | Noted: 2019-07-23

## 2019-07-23 LAB
ANION GAP SERPL CALCULATED.3IONS-SCNC: 6 MMOL/L (ref 3–14)
APTT PPP: 31 SEC (ref 22–37)
BASOPHILS # BLD AUTO: 0 10E9/L (ref 0–0.2)
BASOPHILS NFR BLD AUTO: 0.3 %
BUN SERPL-MCNC: 27 MG/DL (ref 7–30)
CALCIUM SERPL-MCNC: 10.9 MG/DL (ref 8.5–10.1)
CHLORIDE SERPL-SCNC: 108 MMOL/L (ref 94–109)
CO2 SERPL-SCNC: 27 MMOL/L (ref 20–32)
CREAT SERPL-MCNC: 0.81 MG/DL (ref 0.52–1.04)
DIFFERENTIAL METHOD BLD: NORMAL
EOSINOPHIL # BLD AUTO: 0.2 10E9/L (ref 0–0.7)
EOSINOPHIL NFR BLD AUTO: 1.6 %
ERYTHROCYTE [DISTWIDTH] IN BLOOD BY AUTOMATED COUNT: 13.6 % (ref 10–15)
GFR SERPL CREATININE-BSD FRML MDRD: 70 ML/MIN/{1.73_M2}
GLUCOSE SERPL-MCNC: 128 MG/DL (ref 70–99)
HCT VFR BLD AUTO: 43 % (ref 35–47)
HGB BLD-MCNC: 14.2 G/DL (ref 11.7–15.7)
IMM GRANULOCYTES # BLD: 0.1 10E9/L (ref 0–0.4)
IMM GRANULOCYTES NFR BLD: 0.5 %
INR PPP: 0.98 (ref 0.86–1.14)
LACTATE BLD-SCNC: 2.5 MMOL/L (ref 0.7–2)
LYMPHOCYTES # BLD AUTO: 2.4 10E9/L (ref 0.8–5.3)
LYMPHOCYTES NFR BLD AUTO: 21.9 %
MCH RBC QN AUTO: 30.3 PG (ref 26.5–33)
MCHC RBC AUTO-ENTMCNC: 33 G/DL (ref 31.5–36.5)
MCV RBC AUTO: 92 FL (ref 78–100)
MONOCYTES # BLD AUTO: 1.3 10E9/L (ref 0–1.3)
MONOCYTES NFR BLD AUTO: 11.5 %
NEUTROPHILS # BLD AUTO: 7 10E9/L (ref 1.6–8.3)
NEUTROPHILS NFR BLD AUTO: 64.2 %
NRBC # BLD AUTO: 0 10*3/UL
NRBC BLD AUTO-RTO: 0 /100
NT-PROBNP SERPL-MCNC: 546 PG/ML (ref 0–1800)
PLATELET # BLD AUTO: 218 10E9/L (ref 150–450)
POTASSIUM SERPL-SCNC: 4.2 MMOL/L (ref 3.4–5.3)
RADIOLOGIST FLAGS: ABNORMAL
RBC # BLD AUTO: 4.68 10E12/L (ref 3.8–5.2)
SODIUM SERPL-SCNC: 141 MMOL/L (ref 133–144)
TROPONIN I SERPL-MCNC: 0.39 UG/L (ref 0–0.04)
TROPONIN I SERPL-MCNC: 0.41 UG/L (ref 0–0.04)
WBC # BLD AUTO: 10.9 10E9/L (ref 4–11)

## 2019-07-23 PROCEDURE — 25000132 ZZH RX MED GY IP 250 OP 250 PS 637: Performed by: INTERNAL MEDICINE

## 2019-07-23 PROCEDURE — 12000000 ZZH R&B MED SURG/OB

## 2019-07-23 PROCEDURE — 93005 ELECTROCARDIOGRAM TRACING: CPT

## 2019-07-23 PROCEDURE — 25000128 H RX IP 250 OP 636: Performed by: EMERGENCY MEDICINE

## 2019-07-23 PROCEDURE — 83605 ASSAY OF LACTIC ACID: CPT | Performed by: INTERNAL MEDICINE

## 2019-07-23 PROCEDURE — 96365 THER/PROPH/DIAG IV INF INIT: CPT

## 2019-07-23 PROCEDURE — 93970 EXTREMITY STUDY: CPT

## 2019-07-23 PROCEDURE — 85025 COMPLETE CBC W/AUTO DIFF WBC: CPT | Performed by: EMERGENCY MEDICINE

## 2019-07-23 PROCEDURE — 36415 COLL VENOUS BLD VENIPUNCTURE: CPT | Performed by: INTERNAL MEDICINE

## 2019-07-23 PROCEDURE — 96366 THER/PROPH/DIAG IV INF ADDON: CPT

## 2019-07-23 PROCEDURE — 83880 ASSAY OF NATRIURETIC PEPTIDE: CPT | Performed by: EMERGENCY MEDICINE

## 2019-07-23 PROCEDURE — 85610 PROTHROMBIN TIME: CPT | Performed by: EMERGENCY MEDICINE

## 2019-07-23 PROCEDURE — 71260 CT THORAX DX C+: CPT

## 2019-07-23 PROCEDURE — 96376 TX/PRO/DX INJ SAME DRUG ADON: CPT

## 2019-07-23 PROCEDURE — 99285 EMERGENCY DEPT VISIT HI MDM: CPT | Mod: 25

## 2019-07-23 PROCEDURE — 80048 BASIC METABOLIC PNL TOTAL CA: CPT | Performed by: EMERGENCY MEDICINE

## 2019-07-23 PROCEDURE — 84484 ASSAY OF TROPONIN QUANT: CPT | Performed by: EMERGENCY MEDICINE

## 2019-07-23 PROCEDURE — 99223 1ST HOSP IP/OBS HIGH 75: CPT | Mod: AI | Performed by: INTERNAL MEDICINE

## 2019-07-23 PROCEDURE — 25000128 H RX IP 250 OP 636: Performed by: INTERNAL MEDICINE

## 2019-07-23 PROCEDURE — 85730 THROMBOPLASTIN TIME PARTIAL: CPT | Performed by: EMERGENCY MEDICINE

## 2019-07-23 PROCEDURE — 84484 ASSAY OF TROPONIN QUANT: CPT | Performed by: INTERNAL MEDICINE

## 2019-07-23 RX ORDER — NALOXONE HYDROCHLORIDE 0.4 MG/ML
.1-.4 INJECTION, SOLUTION INTRAMUSCULAR; INTRAVENOUS; SUBCUTANEOUS
Status: DISCONTINUED | OUTPATIENT
Start: 2019-07-23 | End: 2019-07-26 | Stop reason: HOSPADM

## 2019-07-23 RX ORDER — AMOXICILLIN 250 MG
2 CAPSULE ORAL 2 TIMES DAILY PRN
Status: DISCONTINUED | OUTPATIENT
Start: 2019-07-23 | End: 2019-07-26 | Stop reason: HOSPADM

## 2019-07-23 RX ORDER — AMOXICILLIN 250 MG
1 CAPSULE ORAL 2 TIMES DAILY PRN
Status: DISCONTINUED | OUTPATIENT
Start: 2019-07-23 | End: 2019-07-26 | Stop reason: HOSPADM

## 2019-07-23 RX ORDER — PRAVASTATIN SODIUM 20 MG
40 TABLET ORAL EVERY EVENING
Status: DISCONTINUED | OUTPATIENT
Start: 2019-07-23 | End: 2019-07-26 | Stop reason: HOSPADM

## 2019-07-23 RX ORDER — LOSARTAN POTASSIUM 50 MG/1
50 TABLET ORAL DAILY
Status: DISCONTINUED | OUTPATIENT
Start: 2019-07-24 | End: 2019-07-26 | Stop reason: HOSPADM

## 2019-07-23 RX ORDER — METFORMIN HCL 500 MG
500 TABLET, EXTENDED RELEASE 24 HR ORAL DAILY
Status: DISCONTINUED | OUTPATIENT
Start: 2019-07-24 | End: 2019-07-26 | Stop reason: HOSPADM

## 2019-07-23 RX ORDER — AMLODIPINE BESYLATE 5 MG/1
5 TABLET ORAL DAILY
Status: DISCONTINUED | OUTPATIENT
Start: 2019-07-24 | End: 2019-07-26 | Stop reason: HOSPADM

## 2019-07-23 RX ORDER — IOPAMIDOL 755 MG/ML
200 INJECTION, SOLUTION INTRAVASCULAR ONCE
Status: COMPLETED | OUTPATIENT
Start: 2019-07-23 | End: 2019-07-23

## 2019-07-23 RX ORDER — ONDANSETRON 4 MG/1
4 TABLET, ORALLY DISINTEGRATING ORAL EVERY 6 HOURS PRN
Status: DISCONTINUED | OUTPATIENT
Start: 2019-07-23 | End: 2019-07-26 | Stop reason: HOSPADM

## 2019-07-23 RX ORDER — LIDOCAINE 40 MG/G
CREAM TOPICAL
Status: DISCONTINUED | OUTPATIENT
Start: 2019-07-23 | End: 2019-07-26 | Stop reason: HOSPADM

## 2019-07-23 RX ORDER — ACETAMINOPHEN 325 MG/1
650 TABLET ORAL EVERY 4 HOURS PRN
Status: DISCONTINUED | OUTPATIENT
Start: 2019-07-23 | End: 2019-07-26 | Stop reason: HOSPADM

## 2019-07-23 RX ORDER — ASPIRIN 81 MG/1
162 TABLET, CHEWABLE ORAL DAILY
Status: ON HOLD | COMMUNITY
End: 2019-07-25

## 2019-07-23 RX ORDER — ONDANSETRON 2 MG/ML
4 INJECTION INTRAMUSCULAR; INTRAVENOUS EVERY 6 HOURS PRN
Status: DISCONTINUED | OUTPATIENT
Start: 2019-07-23 | End: 2019-07-26 | Stop reason: HOSPADM

## 2019-07-23 RX ADMIN — PRAVASTATIN SODIUM 40 MG: 20 TABLET ORAL at 22:53

## 2019-07-23 RX ADMIN — IOPAMIDOL 69 ML: 755 INJECTION, SOLUTION INTRAVENOUS at 19:19

## 2019-07-23 RX ADMIN — HEPARIN SODIUM 18 UNITS/KG/HR: 10000 INJECTION, SOLUTION INTRAVENOUS at 20:02

## 2019-07-23 RX ADMIN — SODIUM CHLORIDE 83 ML: 9 INJECTION, SOLUTION INTRAVENOUS at 19:19

## 2019-07-23 RX ADMIN — Medication 5500 UNITS: at 20:01

## 2019-07-23 ASSESSMENT — ENCOUNTER SYMPTOMS
BACK PAIN: 0
SHORTNESS OF BREATH: 1
FEVER: 0
DIZZINESS: 0

## 2019-07-23 ASSESSMENT — MIFFLIN-ST. JEOR
SCORE: 1336.83
SCORE: 1343.64

## 2019-07-23 NOTE — ED PROVIDER NOTES
History     Chief Complaint:  Shortness of Breath    HPI   Kelle Abdalla is a 78 year old female, with a history of diabetes, hypertension, hyperlipidemia, who presents with her friend to the emergency department for evaluation of shortness of breath. The patient reports she recently had a partial left knee replacement surgery six weeks prior to evaluation and yesterday she started experiencing shortness of breath. She reports she was performing normal daily activities yesterday when she started experiencing heavy breathing and noticed she was more tired than she normally would be. She also notes increased swelling to her left ankle over the last week. She notes the shortness of breath does not worsen with exertion. She denies any chest pain, back pain, dizziness, or fever. She does note her father passed away due to a pulmonary embolism.    CARDIAC RISK FACTORS:  Sex:    F  Tobacco:   Former  Hypertension:   Yes  Hyperlipidemia:  Yes  Diabetes:   Yes  Family History:  No    PE/DVT RISK FACTORS:  Sex:    F  Hormones:   Yes  Tobacco:   Former  Cancer:   No  Travel:   No  Surgery:   Yes  Other immobilization: No  Personal history:  No  Family history:  Yes    Allergies:  Atorvastatin: GI distrubance     Medications:    Amlodipine  81 mg Aspirin  Estradiol  Hyzaar  Metformin  Omeprazole  Pravastatin  Spironolactone    Past Medical History:    Cystocele  DESIREE  DM2  Anxiety  GERD  Depression  Psoriasis  HTN  HLD  OA  Varicose veins  Tubular adenoma    Past Surgical History:    Cholecystectomy  Davinci sacrocolpopexy  Bladder surgery  JORI  BSO  Bunionectomy    Family History:    PE    Social History:  Smoking status: Former smoker of 2.0 ppd for 10 years. Quit date: 1/1/1977  Alcohol use: Yes  The patient presents to the emergency department with her friend.  Marital Status:   [2]     Review of Systems   Constitutional: Negative for fever.   Respiratory: Positive for shortness of breath.   "  Cardiovascular: Positive for leg swelling. Negative for chest pain.   Musculoskeletal: Negative for back pain.   Neurological: Negative for dizziness.   All other systems reviewed and are negative.        Physical Exam   Patient Vitals for the past 24 hrs:   BP Temp Temp src Heart Rate Resp SpO2 Height Weight   07/23/19 1930 131/75 -- -- -- -- -- -- --   07/23/19 1915 -- -- -- 112 (!) 33 92 % -- --   07/23/19 1900 -- -- -- 111 29 93 % -- --   07/23/19 1845 -- -- -- 103 16 91 % -- --   07/23/19 1830 -- -- -- 109 28 -- -- --   07/23/19 1815 -- -- -- 110 30 94 % -- --   07/23/19 1800 -- -- -- 108 -- -- -- --   07/23/19 1746 -- -- -- 113 26 94 % -- --   07/23/19 1745 -- -- -- 109 25 96 % -- --   07/23/19 1729 135/68 97.7  F (36.5  C) Oral 116 26 90 % 1.676 m (5' 6\") 83 kg (183 lb)     Physical Exam  Nursing note and vitals reviewed.  Constitutional: Cooperative.   HENT:   Mouth/Throat: Moist mucous membranes.   Eyes: EOMI, nonicteric sclera  Cardiovascular: Normal rate, regular rhythm, no murmurs, rubs, or gallops  Pulmonary/Chest: Mildly increased effort with tachypnea.  Breath sounds normal. No respiratory distress. No wheezes. No rales.   Abdominal: Soft. Nontender, nondistended, no guarding or rigidity. BS present.   Musculoskeletal: Normal range of motion. Left lower extremity redness and swelling.  Neurological: Alert. Moves all extremities spontaneously.   Skin: Skin is warm and dry. No rash noted.   Psychiatric: Normal mood and affect.       Emergency Department Course   ECG (17:45:11):  Rate 111 bpm. [place] interval 150. QRS duration 82. QT/QTc 314/427. P-R-T axes 67 -6 63. Sinus tachycardia. Inferior infarct, age undetermined. Anterior infarct, age undetermined. Abnormal ECG. Interpreted at 1753 by Angel Garzon MD.    Imaging:  Radiographic findings were communicated with the patient who voiced understanding of the findings.    CT Chest Pulmonary Embolism w Contrast  IMPRESSION:  1. Large bilateral " pulmonary emboli with suggestion of right heart  strain.  2. Cirrhotic appearance of the liver.  Imaging independently reviewed and agree with radiologist interpretation.     US Lower Extremity Venous Duplex Bilateral  IMPRESSION: Positive for left lower extremity DVT.  Imaging independently reviewed and agree with radiologist interpretation.     Laboratory:  CBC: AWNL (WBC 10.9, HGB 14.2, )  BMP: Glucose 128 (H), Calcium 10.9 (H) o/w WNL (Creatinine 0.81)    Troponin I (1748): 0.386 (HH)  BNP: 546  INR: 0.98  PTT: 31    Interventions:  2001 Heparin 5500 Units IV  2002 Heparin 58645 Units IV    Emergency Department Course:  Past medical records, nursing notes, and vitals reviewed.  1803: I performed an exam of the patient and obtained history, as documented above.     IV inserted and blood drawn.    The patient was sent for a chest CT and a lower extremity ultrasound while in the emergency department, findings above.    1932: I discussed imaging results with the radiologist.    1955: I rechecked the patient. Explained findings to the patient and her friend.    Findings and plan explained to the Patient who consents to admission.     2017: Discussed the patient with Dr. Osman, who will admit the patient to a cardiac telemetry bed for further monitoring, evaluation, and treatment.   Impression & Plan    Medical Decision Making:  Patient presents with chief complaint dyspnea worse in the last 2 days.  She had recent surgery and also had left lower extremity redness and edema.  Clinically this is concerning for PE, but also considered pneumonia, ACS, pleural effusion, among many other etiologies.  Given patient is high risk for PE, she went straight for CT of her chest which does show bilateral PE.  She has signs of right heart strain on CT as well as with an elevated troponin.  BNP is normal.  Patient was immediately started on heparin for treatment.  There is no hypotension to suggest need for lytic therapy.   She also has minimal clot burden in her lower extremities.  Discussed with hospitalist, Dr. Osman who accepts for admission.  All of patient and her family's questions were answered and they are in agreement with the plan.    Diagnosis:    ICD-10-CM    1. Acute pulmonary embolism with acute cor pulmonale, unspecified pulmonary embolism type (H) I26.09      Disposition:  Admitted to cardiac telemetry.    Graciela Hernandez  7/23/2019   [facility] EMERGENCY DEPARTMENT  Scribe Disclosure:  I, Graciela Hernandez, am serving as a scribe at 6:03 PM on 7/23/2019 to document services personally performed by Angel Garzon MD based on my observations and the provider's statements to me.      Angel Garzon MD  07/24/19 0215

## 2019-07-23 NOTE — ED TRIAGE NOTES
Patient presents with shortness of breath that started yesterday, especially with activity. Patient had a partial knee replacement 6 weeks ago. Patient states shortness of breath came out of nowhere. Increased swelling to left leg in past week. ABCDs intact, alert and oriented x 4.

## 2019-07-24 ENCOUNTER — APPOINTMENT (OUTPATIENT)
Dept: CARDIOLOGY | Facility: CLINIC | Age: 79
DRG: 176 | End: 2019-07-24
Attending: INTERNAL MEDICINE
Payer: COMMERCIAL

## 2019-07-24 LAB
ANION GAP SERPL CALCULATED.3IONS-SCNC: 9 MMOL/L (ref 3–14)
BUN SERPL-MCNC: 21 MG/DL (ref 7–30)
CALCIUM SERPL-MCNC: 10.1 MG/DL (ref 8.5–10.1)
CHLORIDE SERPL-SCNC: 108 MMOL/L (ref 94–109)
CO2 SERPL-SCNC: 23 MMOL/L (ref 20–32)
CREAT SERPL-MCNC: 0.75 MG/DL (ref 0.52–1.04)
ERYTHROCYTE [DISTWIDTH] IN BLOOD BY AUTOMATED COUNT: 13.6 % (ref 10–15)
GFR SERPL CREATININE-BSD FRML MDRD: 77 ML/MIN/{1.73_M2}
GLUCOSE SERPL-MCNC: 138 MG/DL (ref 70–99)
HCT VFR BLD AUTO: 39.7 % (ref 35–47)
HGB BLD-MCNC: 13.2 G/DL (ref 11.7–15.7)
INTERPRETATION ECG - MUSE: NORMAL
LMWH PPP CHRO-ACNC: 0.55 IU/ML
LMWH PPP CHRO-ACNC: 0.57 IU/ML
MCH RBC QN AUTO: 30.4 PG (ref 26.5–33)
MCHC RBC AUTO-ENTMCNC: 33.2 G/DL (ref 31.5–36.5)
MCV RBC AUTO: 92 FL (ref 78–100)
PLATELET # BLD AUTO: 203 10E9/L (ref 150–450)
POTASSIUM SERPL-SCNC: 4.1 MMOL/L (ref 3.4–5.3)
RBC # BLD AUTO: 4.34 10E12/L (ref 3.8–5.2)
SODIUM SERPL-SCNC: 140 MMOL/L (ref 133–144)
TROPONIN I SERPL-MCNC: 0.26 UG/L (ref 0–0.04)
WBC # BLD AUTO: 7 10E9/L (ref 4–11)

## 2019-07-24 PROCEDURE — 85520 HEPARIN ASSAY: CPT | Performed by: INTERNAL MEDICINE

## 2019-07-24 PROCEDURE — 25000132 ZZH RX MED GY IP 250 OP 250 PS 637: Performed by: INTERNAL MEDICINE

## 2019-07-24 PROCEDURE — 85027 COMPLETE CBC AUTOMATED: CPT | Performed by: INTERNAL MEDICINE

## 2019-07-24 PROCEDURE — 93306 TTE W/DOPPLER COMPLETE: CPT | Mod: 26 | Performed by: INTERNAL MEDICINE

## 2019-07-24 PROCEDURE — 12000000 ZZH R&B MED SURG/OB

## 2019-07-24 PROCEDURE — 84484 ASSAY OF TROPONIN QUANT: CPT | Performed by: INTERNAL MEDICINE

## 2019-07-24 PROCEDURE — 25000128 H RX IP 250 OP 636: Performed by: INTERNAL MEDICINE

## 2019-07-24 PROCEDURE — 80048 BASIC METABOLIC PNL TOTAL CA: CPT | Performed by: INTERNAL MEDICINE

## 2019-07-24 PROCEDURE — 36415 COLL VENOUS BLD VENIPUNCTURE: CPT | Performed by: INTERNAL MEDICINE

## 2019-07-24 PROCEDURE — 93306 TTE W/DOPPLER COMPLETE: CPT

## 2019-07-24 PROCEDURE — 99233 SBSQ HOSP IP/OBS HIGH 50: CPT | Performed by: INTERNAL MEDICINE

## 2019-07-24 RX ADMIN — PRAVASTATIN SODIUM 40 MG: 20 TABLET ORAL at 20:13

## 2019-07-24 RX ADMIN — LOSARTAN POTASSIUM 50 MG: 50 TABLET ORAL at 10:03

## 2019-07-24 RX ADMIN — AMLODIPINE BESYLATE 5 MG: 5 TABLET ORAL at 10:02

## 2019-07-24 RX ADMIN — METFORMIN HYDROCHLORIDE 500 MG: 500 TABLET, EXTENDED RELEASE ORAL at 10:02

## 2019-07-24 RX ADMIN — HEPARIN SODIUM 1250 UNITS/HR: 10000 INJECTION, SOLUTION INTRAVENOUS at 10:04

## 2019-07-24 ASSESSMENT — ACTIVITIES OF DAILY LIVING (ADL)
ADLS_ACUITY_SCORE: 13
ADLS_ACUITY_SCORE: 14
ADLS_ACUITY_SCORE: 13
ADLS_ACUITY_SCORE: 14

## 2019-07-24 NOTE — PLAN OF CARE
Patient hospitalized for PE, A&Ox4, VSS, Tele SR tachy, denies pain, denies SOB,LA diminished,  sats 93-96% RA. Skin intact. Modcho diet with good appetite, +BS, LBM 7/23/19, voiding adequately without difficult. Lab: troponin 0.408. On hep drip @ 12.5 ml/hr. Disposition TBD

## 2019-07-24 NOTE — PROGRESS NOTES
[facility]    Sepsis Evaluation Progress Note    Date of Service: 07/23/2019    I was called to see Kelle Abdalla due to abnormal vital signs triggering the Sepsis SIRS screening alert. She is not known to have an infection.     Physical Exam    Vital Signs:  Temp: 97.1  F (36.2  C) Temp src: Oral BP: 134/72 Pulse: 112 Heart Rate: 108 Resp: 26 SpO2: 92 % O2 Device: None (Room air)      Lab:  Lactate for Sepsis Protocol   Date Value Ref Range Status   07/23/2019 2.5 (H) 0.7 - 2.0 mmol/L Final     Comment:     Significant value called to and read back by  JENNY RICHARDSON @ MS3 ON [id number] @ 23:32 / ZW         The patient is at baseline mental status.    The rest of their physical exam is significant for tachycardia.    Assessment and Plan    The SIRS and exam findings are likely due to pulmonary embolism, there is no sign of sepsis at this time.    Disposition: The patient will remain on the current unit. We will continue to monitor this patient closely.    Edmond Molina MD

## 2019-07-24 NOTE — ED NOTES
[facility]  ED Nurse Handoff Report    Kelle Abdalla is a 78 year old female   ED Chief complaint: Shortness of Breath  . ED Diagnosis:   Final diagnoses:   Acute pulmonary embolism with acute cor pulmonale, unspecified pulmonary embolism type (H)     Allergies:   Allergies   Allergen Reactions     Atorvastatin GI Disturbance     Pollen Extracts [Pollen Extract]        Code Status: Full Code  Activity level - Baseline/Home:  Independent. Activity Level - Current:   Stand by Assist. Lift room needed: No. Bariatric: No   Needed: No   Isolation: No. Infection: Not Applicable.     Vital Signs:   Vitals:    07/23/19 1845 07/23/19 1900 07/23/19 1915 07/23/19 1930   BP:    131/75   Resp: 16 29 (!) 33    Temp:       TempSrc:       SpO2: 91% 93% 92%    Weight:       Height:           Cardiac Rhythm:  ,   Cardiac  Cardiac Rhythm: Sinus tachycardia  Pain level: 0-10 Pain Scale: 0  Patient confused: No. Patient Falls Risk: Yes.   Elimination Status: Has voided   Patient Report - Initial Complaint:    Patient presents with shortness of breath that started yesterday, especially with activity. Patient had a partial knee replacement 6 weeks ago. Patient states shortness of breath came out of nowhere. Increased swelling to left leg in past week. ABCDs intact, alert and oriented x 4.      . Focused Assessment: Cardiac - Cardiac WDL: -WDL except   Cardiac Monitoring - EKG Monitoring: Yes  Cardiac Rhythm: ST       19:00 Respiratory Respiratory - Respiratory WDL: -WDL except (dyspnea on exertion; new swelling to left leg started about two days ago then sudden onset SOB yesterday; ) KB     19:00 Musculoskeletal Musculoskeletal - Musculoskeletal WDL: -WDL except  CMS Intact: Yes  Musculoskeletal Comment: swelling to left lower leg; sx on left knee 6 weeks ago          Tests Performed: labs, CT, US, EKG. Abnormal Results:   Labs Ordered and Resulted from Time of ED Arrival Up to the Time of Departure from  the ED   BASIC METABOLIC PANEL - Abnormal; Notable for the following components:       Result Value    Glucose 128 (*)     Calcium 10.9 (*)     All other components within normal limits   TROPONIN I - Abnormal; Notable for the following components:    Troponin I ES 0.386 (*)     All other components within normal limits   CBC WITH PLATELETS DIFFERENTIAL   INR   NT PROBNP INPATIENT   PARTIAL THROMBOPLASTIN TIME   PERIPHERAL IV CATHETER   MEASURE WEIGHT     CT Chest Pulmonary Embolism w Contrast   Final Result   Abnormal   IMPRESSION:   1. Large bilateral pulmonary emboli with suggestion of right heart   strain.   2. Cirrhotic appearance of the liver.      [Critical Result: Pulmonary embolism]      Finding was identified on 7/23/2019 7:29 PM.       Dr. Garzon was contacted by me on 7/23/2019 7:33 PM and verbalized   understanding of the critical result.       LORRIE GARZON MD      US Lower Extremity Venous Duplex Bilateral    (Results Pending)     .   Treatments provided: heparin  Family Comments: friend at bedside  OBS brochure/video discussed/provided to patient:  N/A  ED Medications:   Medications   heparin infusion 25,000 units in 0.45% NaCl 250 mL (18 Units/kg/hr × 68.8 kg (Adjusted) Intravenous New Bag 7/23/19 2002)   iopamidol (ISOVUE-370) solution 200 mL (69 mLs Intravenous Given 7/23/19 1919)   0.9% sodium chloride BOLUS (0 mLs Intravenous Stopped 7/23/19 1921)   heparin Loading Dose bolus dose from infusion pump 5,500 Units (5,500 Units Intravenous Given 7/23/19 2001)     Drips infusing:  Yes  For the majority of the shift, the patient's behavior Green. Interventions performed were none.     Severe Sepsis OR Septic Shock Diagnosis Present: No      ED Nurse Name/Phone Number: Candelaria Avery,   8:36 PM  RECEIVING UNIT ED HANDOFF REVIEW    Above ED Nurse Handoff Report was reviewed: Yes  Reviewed by: Jose Mccarthy on July 23, 2019 at 9:36 PM

## 2019-07-24 NOTE — PROVIDER NOTIFICATION
07/23/19 2332   Significant Event   Significant Event Other (see comments)  (Lactic of 2.5)   Admitting MD paged to inform of above lab result.  ADDM; Please refer to Dr. Molina's note.  Yanet Montgomery, MADISONN, RN  Medical/Telemetry - 3

## 2019-07-24 NOTE — PHARMACY
Anticoagulation coverage check.  Patient has Medicare D through [facility] with $200 (of $200) unmet deductible.    Xarelto/Eliquis  July:  Upon receipt of RX, Discharge Pharmacy can provide 1 month free.  Aug: $240  Sept-Dec: $40/mo    Enoxaparin 100mg x 10 syringes: $12    Jantoven (warfarin)  $5/mo      -IDALIA Valdez, Pharmacy Technician/Liaison, Discharge Pharmacy [phone]

## 2019-07-24 NOTE — H&P
[facility]  Hospitalist Admission Note  Name: Kelle Abdalla    MRN: [id number]  YOB: 1940    Age: 78 year old  Date of admission: 7/23/2019  Primary care provider: Elizabeth Díaz    Chief Complaint:  Shortness of breath    Assessment and Plan:   Acute submassive bilateral PE: Progressive exertional dyspnea over the past 48 hours pta.  Also left leg edema for the past 1 week.  CTPA showing large bilateral pulmonary emboli with likely RV strain.  Mildly tachycardic, but normotensive to hypertensive so overall hemodynamically stable.  Not currently hypoxic on room air.  Bilateral lower extremity ultrasound obtained to assess clot burden, that shows some residual occlusive thrombus in the left mid and proximal calf peroneal veins with extension into the popliteal vein.  She did have partial knee replacement approximately 6 weeks ago and has been taking 81 mg aspirin twice daily for DVT prophylaxis, likely this is a provoked clot.  No personal history of thromboembolism, however her father did die from an acute PE many years ago.  Started on heparin drip in the ED.  -Heparin drip  -Obtain TTE  -Pharmacy liaison consult for warfarin versus NOAC coverage  -Close monitoring for any hypotension, worsening tachycardia, or hypoxemia that would indicate potential need for lytic therapy    Sinus tachycardia, elevated troponin:  -120s.  Troponin elevated at 0.38.  Secondary to acute PE with likely some RV strain.  Currently normotensive to hypertensive.  -Telemetry and serial troponin  -Obtain TTE    HTN: PTA on losartan 50 mg daily, HCTZ 12.5 mg daily, spironolactone 25 mg daily, and amlodipine 5 mg daily.  -Resume losartan, amlodipine tomorrow with hold parameters for SBP < 120  -hold HCTZ and spironolactone initially to prevent preload reduction    Type II DM: PTA on metformin  mg daily, resume.    HLD: Resume PTA pravastatin    Hypercalcemia: Calcium level 10.9.  Chronic  issue for the patient.    Incidental findings: CT chest comments on a cirrhotic appearing liver, did have CT abdomen in June 2018 through care everywhere that did not comment on any cirrhotic features.  In the past has had elevations in transaminases followed by previous provider.  Follow-up with primary care doctor, consideration for outpatient abdominal ultrasound.    DVT Prophylaxis: heparin gtt  Code Status: Full Code  FEN: regular diet,    Discharge Dispo: home  Estimated Disch Date / # of Days until Disch: admit inpatient to cardiac tele for submassive bilateral PE with RV strain.  Anticipate minimal 2 night hospitalization       History of Present Illness:  Kelle Abdalla is a 78 year old female with PMH including HTN, HLD, type II DM on metformin, former smoker, hypercalcemia, and osteoarthritis who underwent recent partial knee replacement approximately 6 weeks ago who was taking 81 mg aspirin twice daily for DVT prophylaxis who presents with progressive exertional dyspnea over the past 48 hours and overall fatigue.  Symptoms initially started as noticing that she was feeling more winded after completing ADLs.  She can only complete one third of her gym routine today.  She also needed help having her groceries bag today.  She spoke to the triage nurse who recommended she come in to the emergency department.  She has not noted any chest pain or chest pressure sensation.  She has had new left leg swelling for the past 1 week or so without any pain in this area.  Denies any lightheadedness or dizziness.  No history of DVT or PE although she notes her father passed away many years ago from an acute PE.  No fevers, chills, cough, abdominal pain, nausea, vomiting.  No history of intracranial bleeding.  Denies any hematochezia, melena, hematuria.    History obtained from patient, medical record, and from Dr. Garzon in the emergency department.  Pressure 131-150 systolic.  Tachycardic to the 110s.  EKG  shows sinus tachycardia.  Afebrile and not hypoxic on room air.  CBC and BMP unremarkable.  Troponin is elevated at 0.386.  .  CT PA shows large bilateral PE with some evidence for RV strain.  Recommended lower extremity ultrasound to assess clot burden.  Bilateral lower extremity ultrasound shows some clot in the left peroneal veins extending into the popliteal vein.  Started on heparin drip.  Did discuss potential transfer for lytics, but based on overall hemodynamic stability without any hypotension and in fact hypertension despite taking all of her blood pressure medications this morning decision made to admit the patient here as inpatient status to cardiac telemetry floor.     Past Medical History reviewed:  Past Medical History:   Diagnosis Date     Anxiety      Depression      Diabetes (H)     TYPE 2     Gastroesophageal reflux disease      Hyperlipidemia      Hypertension      Overactive bladder      Psoriasis      Sleep apnea      Tubular adenoma      Past Surgical History reviewed:  Past Surgical History:   Procedure Laterality Date     CHOLECYSTECTOMY       DAVINCI SACROCOLPOPEXY, MIDURETHRAL SLING, CYSTOSCOPY N/A 10/19/2018    Procedure: DAVINCI SACROCOLPOPEXY CYSTOSCOPY AND MIDURETHRAL SLING (ANDREW) ;  Surgeon: Pool Aldana MD;  Location:  OR     GENITOURINARY SURGERY      BLADDER     LAPAROSCOPIC HYSTERECTOMY SUPRACERVICAL, BILATERAL SALPINGO-OOPHORECTOMY, COMBINED Bilateral 10/19/2018    Procedure: LAPAROSCOPY, LAPAROSCOPIC SUPRACERVICAL HYSTERECTOMY BILATERAL SALPINGO--OOPHORECTOMY (AMY) DAVINCI SACROCOLPOPEXY, MIDURETHRAL SLING, CYSTOSCOPY (ANDREW);  Surgeon: Edmond Pfeiffer MD;  Location:  OR     ORTHOPEDIC SURGERY      BUNIONECTOMY     ORTHOPEDIC SURGERY       ARTHROSCOPY OF KNEE     Social History reviewed:  Social History     Tobacco Use     Smoking status: Former Smoker     Packs/day: 2.00     Years: 10.00     Pack years: 20.00     Last attempt to quit: 1/1/1977      Years since quittin.5     Smokeless tobacco: Never Used   Substance Use Topics     Alcohol use: Yes     Comment: 1-2 weekly     Social History     Social History Narrative     Not on file     Family History reviewed:  Patient's father  from PE    Allergies:  Allergies   Allergen Reactions     Atorvastatin GI Disturbance     Pollen Extracts [Pollen Extract]      Medications:  Prior to Admission medications    Medication Sig Last Dose Taking? Auth Provider   AmLODIPine Besylate (NORVASC PO) Take 5 mg by mouth daily   Reported, Patient   aspirin 81 MG EC tablet Take 1 tablet (81 mg) by mouth daily   Pool Aldana MD   estradiol (ESTRACE) 0.1 MG/GM cream Pea size on a finger to the vagina Q hs for one month   Pool Aldana MD   HYDROcodone-acetaminophen (NORCO) 5-325 MG per tablet Take 1 tablet by mouth every 4 hours as needed for pain   Pool Aldana MD   losartan-hydrochlorothiazide (HYZAAR) 100-25 MG per tablet Take 1 tablet by mouth daily   Reported, Patient   MetFORMIN HCl (GLUCOPHAGE XR PO) Take 500 mg by mouth daily   Reported, Patient   Multiple Vitamins-Minerals (WOMENS MULTIVITAMIN PO) Take 1 tablet by mouth daily   Reported, Patient   Omega-3 Fatty Acids (FISH OIL PO) Take 1,000 mg by mouth daily   Reported, Patient   Omeprazole (PRILOSEC PO) Take 20 mg by mouth daily   Reported, Patient   PRAVASTATIN SODIUM PO Take 40 mg by mouth every evening   Reported, Patient   SPIRONOLACTONE PO Take 25 mg by mouth daily   Reported, Patient   triamcinolone (KENALOG) 0.1 % cream Apply topically daily as needed    Reported, Patient   VITAMIN D, CHOLECALCIFEROL, PO Take 2,000 Units by mouth daily   Reported, Patient     Review of Systems:  A Comprehensive greater than 10 system review of systems was carried out.  Pertinent positives and negatives are noted above.  Otherwise negative.     Physical Exam:  Blood pressure 131/75, temperature 97.7  F (36.5  C), temperature source Oral, resp. rate  "(!) 33, height 1.676 m (5' 6\"), weight 83 kg (183 lb), SpO2 92 %.  Wt Readings from Last 1 Encounters:   07/23/19 83 kg (183 lb)     Exam:  Constitutional: Awake, NAD   Eyes: sclera white   HEENT: atraumatic, MMM  Respiratory: mild tachypnea, lungs cta bilaterally, no crackles or wheeze  Cardiovascular: regular tachycardia, no murmur  GI: non-tender, not distended, bowel sounds present  Skin: no rash or lesions, acyanotic  Musculoskeletal/extremities: atraumatic, no major deformities. 1+ LLE edema  Neurologic: A&O, speech clear, strength and light touch sensation grossly normal  Psychiatric: calm, cooperative, normal affect    Lab and imaging data personally reviewed:  Labs:  Recent Labs   Lab 07/23/19  1748   WBC 10.9   HGB 14.2   HCT 43.0   MCV 92        Recent Labs   Lab 07/23/19  1748      POTASSIUM 4.2   CHLORIDE 108   CO2 27   ANIONGAP 6   *   BUN 27   CR 0.81   GFRESTIMATED 70   GFRESTBLACK 81   ROSE 10.9*     Recent Labs   Lab 07/23/19  1748   NTBNPI 546     Recent Labs   Lab 07/23/19 1748   TROPI 0.386*     INR 0.98    EKG:  Sinus tachycardia    Imaging:  Recent Results (from the past 24 hour(s))   CT Chest Pulmonary Embolism w Contrast   Result Value    Radiologist flags Pulmonary embolism (AA)    Narrative    CT CHEST PULMONARY EMBOLISM WITH CONTRAST   7/23/2019 7:27 PM     HISTORY: Shortness of breath.    TECHNIQUE:  69mL Isovue-370. Radiation dose for this scan was reduced  using automated exposure control, adjustment of the mA and/or kV  according to patient size, or iterative reconstruction technique.    COMPARISON: None.    FINDINGS:  There are multiple large bilateral pulmonary emboli, right  greater than left. There is straightening of the interventricular  septum and right ventricular enlargement, suggestive of right heart  strain.    No pulmonary nodule or mass. No pneumothorax or pleural effusion. No  thoracic or axillary adenopathy.    The liver appears cirrhotic. A cyst is " noted at the upper pole of the  left kidney. No suspicious bone lesions.      Impression    IMPRESSION:  1. Large bilateral pulmonary emboli with suggestion of right heart  strain.  2. Cirrhotic appearance of the liver.    [Critical Result: Pulmonary embolism]    Finding was identified on 7/23/2019 7:29 PM.     Dr. Garzon was contacted by me on 7/23/2019 7:33 PM and verbalized  understanding of the critical result.     LORRIE GARZON MD   US Lower Extremity Venous Duplex Bilateral    Narrative    US LOWER EXTREMITY VENOUS DUPLEX BILATERAL7/23/2019 9:05 PM    HISTORY: Pulmonary embolism, left lower extremity redness/swelling.    TECHNIQUE: Venous Doppler US.?Color flow and spectral Doppler with  waveform analysis performed.    FINDINGS: No evidence of lower extremity deep venous thrombosis in the  right lower extremity. There is occlusive thrombus involving the left  mid and proximal calf peroneal veins, with extension into the  popliteal vein.      Impression    IMPRESSION: Positive for left lower extremity DVT.       Sameer Osman MD  Hospitalist  [facility]

## 2019-07-24 NOTE — PROVIDER NOTIFICATION
"Paged Dr. Daley \"Pt oxygen sats 88-90% on RA. No orders for oxygen. Please advise. Thank you!\"  "

## 2019-07-24 NOTE — PLAN OF CARE
"Orientation: Alert and oriented x4. Agitated and angry with care overnight. States \"I don't understand why you have to keep duplicating cares. Someone just listened to my heart and lungs. I am trying to sleep\" and \"why do you have to do blood again\". Care explained at length to patient. Agreeable to blood draws but refusing 0400 am vitals.   VSS. 92% on RA. Afebrile.   Tele: ST. .   LS: diminished throughout  GI: Passing gas. no BM. Denies N/V.   : Adequate urine output.   Skin: WDL. Pt declining full skin assessment.   Activity: SBA. Up to bathroom overnight. Pt slept comfortably throughout shift.   Pain: 0/10. Denies pain throughout shift. No PRN interventions throughout  Plan: Continue with current cares. Hep gtt continued at 12.5 ml/hr.     "

## 2019-07-24 NOTE — PHARMACY-ADMISSION MEDICATION HISTORY
Admission medication history interview status for this patient is complete. See [facility] admission navigator for allergy information, prior to admission medications and immunization status.     Medication history interview source(s):Patient  Medication history resources (including written lists, pill bottles, clinic record):None    Changes made to PTA medication list:  Added: none  Deleted: estrace vag cream, norco, prilosec, spironolactone  Changed: asa, fish oil    Actions taken by pharmacist (provider contacted, etc):None     Additional medication history information:None    Medication reconciliation/reorder completed by provider prior to medication history? Yes    For patients on insulin therapy: No (Yes/No)   Lantus/levemir/NPH/Mix 70/30 dose: ___ in AM/PM or twice daily   Sliding scale Novolog Y/N   If Yes, do you have a baseline novolog pre-meal dose: ______units with meals   Patients eat three meals a day: Y/N ---  How many episodes of hypoglycemia (low blood glucose) do you have weekly: ---   How many missed doses do you have a week: ---  How many times do you check your blood glucose per day: ---  Any Barriers to therapy: cost of medications/comfortable with giving injections (if applicable)/ comfortable and confident with current diabetes regimen ---      Prior to Admission medications    Medication Sig Last Dose Taking? Auth Provider   AmLODIPine Besylate (NORVASC PO) Take 5 mg by mouth daily 7/23/2019 at Unknown time Yes Reported, Patient   aspirin (ASA) 81 MG chewable tablet Take 162 mg by mouth daily 7/23/2019 at am Yes Unknown, Entered By History   losartan-hydrochlorothiazide (HYZAAR) 100-25 MG per tablet Take 1 tablet by mouth daily 7/23/2019 at Unknown time Yes Reported, Patient   MetFORMIN HCl (GLUCOPHAGE XR PO) Take 500 mg by mouth daily 7/23/2019 at Unknown time Yes Reported, Patient   Multiple Vitamins-Minerals (WOMENS MULTIVITAMIN PO) Take 1 tablet by mouth daily 7/23/2019 at Unknown time Yes  Reported, Patient   Omega-3 Fatty Acids (FISH OIL PO) Take 1,000 mg by mouth 2 times daily  7/23/2019 at am Yes Reported, Patient   PRAVASTATIN SODIUM PO Take 40 mg by mouth every evening 7/22/2019 at Unknown time Yes Reported, Patient   triamcinolone (KENALOG) 0.1 % cream Apply topically daily as needed   Yes Reported, Patient   VITAMIN D, CHOLECALCIFEROL, PO Take 2,000 Units by mouth daily 7/23/2019 at am Yes Reported, Patient

## 2019-07-24 NOTE — PLAN OF CARE
VS stable other than patient needed to be placed on 2 liters of oxygen nasal cannula. Paged Dr. Daley this am about need for 02. A & O x 4.   Tele:sinus rhythm/sinus tach.   Diet:mod cho.   Ambulates:SBA.   IV meds:heparin running at 12.5 ml/hr. Next hep xa draw this nae  Pain:denies .   Abnormal assessments: patient lung sounds diminished. Some swelling in left ankle/foot/leg. See flowsheets for details. Has psoriasis she states- few patchy spots on skin. Pt denies SOB, but nursing noticed she has been [place] when turning in beds at times.     Intake & output: reported a bowel movement this shift. Voiding.   Discharge plan: TBD- per note from MD yesterday, at least 2 days. Will continue to monitor and review plan of care.

## 2019-07-25 LAB
ALBUMIN SERPL-MCNC: 3.3 G/DL (ref 3.4–5)
ALP SERPL-CCNC: 68 U/L (ref 40–150)
ALT SERPL W P-5'-P-CCNC: 36 U/L (ref 0–50)
ANION GAP SERPL CALCULATED.3IONS-SCNC: 5 MMOL/L (ref 3–14)
AST SERPL W P-5'-P-CCNC: 32 U/L (ref 0–45)
BASOPHILS # BLD AUTO: 0.1 10E9/L (ref 0–0.2)
BASOPHILS NFR BLD AUTO: 0.8 %
BILIRUB SERPL-MCNC: 0.4 MG/DL (ref 0.2–1.3)
BUN SERPL-MCNC: 26 MG/DL (ref 7–30)
CALCIUM SERPL-MCNC: 10.4 MG/DL (ref 8.5–10.1)
CHLORIDE SERPL-SCNC: 110 MMOL/L (ref 94–109)
CO2 SERPL-SCNC: 24 MMOL/L (ref 20–32)
CREAT SERPL-MCNC: 0.71 MG/DL (ref 0.52–1.04)
DIFFERENTIAL METHOD BLD: NORMAL
EOSINOPHIL # BLD AUTO: 0.3 10E9/L (ref 0–0.7)
EOSINOPHIL NFR BLD AUTO: 4 %
ERYTHROCYTE [DISTWIDTH] IN BLOOD BY AUTOMATED COUNT: 13.6 % (ref 10–15)
GFR SERPL CREATININE-BSD FRML MDRD: 82 ML/MIN/{1.73_M2}
GLUCOSE SERPL-MCNC: 141 MG/DL (ref 70–99)
HCT VFR BLD AUTO: 42 % (ref 35–47)
HGB BLD-MCNC: 13.6 G/DL (ref 11.7–15.7)
IMM GRANULOCYTES # BLD: 0 10E9/L (ref 0–0.4)
IMM GRANULOCYTES NFR BLD: 0.5 %
LMWH PPP CHRO-ACNC: 0.52 IU/ML
LYMPHOCYTES # BLD AUTO: 2 10E9/L (ref 0.8–5.3)
LYMPHOCYTES NFR BLD AUTO: 32 %
MCH RBC QN AUTO: 29.8 PG (ref 26.5–33)
MCHC RBC AUTO-ENTMCNC: 32.4 G/DL (ref 31.5–36.5)
MCV RBC AUTO: 92 FL (ref 78–100)
MONOCYTES # BLD AUTO: 0.8 10E9/L (ref 0–1.3)
MONOCYTES NFR BLD AUTO: 12.4 %
NEUTROPHILS # BLD AUTO: 3.1 10E9/L (ref 1.6–8.3)
NEUTROPHILS NFR BLD AUTO: 50.3 %
NRBC # BLD AUTO: 0 10*3/UL
NRBC BLD AUTO-RTO: 0 /100
PLATELET # BLD AUTO: 199 10E9/L (ref 150–450)
POTASSIUM SERPL-SCNC: 3.9 MMOL/L (ref 3.4–5.3)
PROT SERPL-MCNC: 6.9 G/DL (ref 6.8–8.8)
RBC # BLD AUTO: 4.56 10E12/L (ref 3.8–5.2)
SODIUM SERPL-SCNC: 139 MMOL/L (ref 133–144)
WBC # BLD AUTO: 6.2 10E9/L (ref 4–11)

## 2019-07-25 PROCEDURE — 25000128 H RX IP 250 OP 636: Performed by: INTERNAL MEDICINE

## 2019-07-25 PROCEDURE — 12000000 ZZH R&B MED SURG/OB

## 2019-07-25 PROCEDURE — 80053 COMPREHEN METABOLIC PANEL: CPT | Performed by: INTERNAL MEDICINE

## 2019-07-25 PROCEDURE — 85025 COMPLETE CBC W/AUTO DIFF WBC: CPT | Performed by: INTERNAL MEDICINE

## 2019-07-25 PROCEDURE — 25000132 ZZH RX MED GY IP 250 OP 250 PS 637: Performed by: INTERNAL MEDICINE

## 2019-07-25 PROCEDURE — 36415 COLL VENOUS BLD VENIPUNCTURE: CPT | Performed by: INTERNAL MEDICINE

## 2019-07-25 PROCEDURE — 99239 HOSP IP/OBS DSCHRG MGMT >30: CPT | Performed by: INTERNAL MEDICINE

## 2019-07-25 PROCEDURE — 85520 HEPARIN ASSAY: CPT | Performed by: INTERNAL MEDICINE

## 2019-07-25 RX ORDER — HYDROCHLOROTHIAZIDE 12.5 MG/1
12.5 CAPSULE ORAL DAILY
Status: DISCONTINUED | OUTPATIENT
Start: 2019-07-25 | End: 2019-07-26 | Stop reason: HOSPADM

## 2019-07-25 RX ADMIN — PRAVASTATIN SODIUM 40 MG: 20 TABLET ORAL at 19:26

## 2019-07-25 RX ADMIN — APIXABAN 10 MG: 5 TABLET, FILM COATED ORAL at 21:18

## 2019-07-25 RX ADMIN — APIXABAN 10 MG: 5 TABLET, FILM COATED ORAL at 11:58

## 2019-07-25 RX ADMIN — HYDROCHLOROTHIAZIDE 12.5 MG: 12.5 CAPSULE ORAL at 15:37

## 2019-07-25 RX ADMIN — HEPARIN SODIUM 1250 UNITS/HR: 10000 INJECTION, SOLUTION INTRAVENOUS at 07:01

## 2019-07-25 RX ADMIN — METFORMIN HYDROCHLORIDE 500 MG: 500 TABLET, EXTENDED RELEASE ORAL at 08:14

## 2019-07-25 ASSESSMENT — ACTIVITIES OF DAILY LIVING (ADL)
ADLS_ACUITY_SCORE: 13

## 2019-07-25 NOTE — PROGRESS NOTES
[facility]  Hospitalist Progress Note  Tonya Daley MD 07/25/2019    Reason for Stay (Diagnosis): Submassive PE         Assessment and Plan:      Summary of Stay: Kelle Abdalla is a 78 year old female with a history of HTN, HLD, type 2 diabetes, former smoker, hypercalcemia, and osteoarthritis who underwent partial left knee replacement on June 5, 2019 on aspirin 81 mg twice daily for DVT prophylaxis who presented with 2 days of progressive exertional dyspnea and generalized fatigue.  Evaluation is consistent with submassive PE with RV strain and elevated troponin.  She has been admitted to inpatient status on 7/23/2019 for anticoagulation and monitoring purposes.    She has done well with IV heparin and we will transition her to apixaban in preparation for discharge     Problem List:   1. Submassive PE: transitioned to oral apixaban today.  Has evidence of RV strain on CT scan as well as an elevated troponin.  BNP is surprisingly negative.  Ultrasound of the heart shows mild RV dilation moderate pulmonary hypertension. Did well on IV heparin, had some transient hypoxia that is improving.  Transition to apixaban   This seems like it was a provoked clot in the setting of recent partial knee replacement surgery.  Given her age I did ask about age and sex related cancer screening and she states that she had her colonoscopy last year and she is does get a mammogram yearly and that is time is coming up soon.  I encouraged her to keep that up-to-date  2. Elevated troponin, sinus tachycardia, elevated lactic acid, mild hypoxia: All secondary to #1-and all resolving.  3. Hypertension: PTA regimen: Losartan 50 mg p.o. daily, hydrochlorothiazide 12.5 mg p.o. daily, spironolactone 25 mg p.o. daily, and amlodipine 5 mg p.o. Daily: meds have been added back in a step wise fashion, my partner will verify she is tolerating them prior to discharge  4. HLP: Continue pravastatin as at home  5. Type 2 diabetes:  "On metformin  mg daily and blood sugars under good control-no need to track with ISS  6. Cirrhotic appearing liver on CT scan: Incidental finding,LFTs wnl-and recommend follow-up with primary MD in the outpatient setting  DVT Prophylaxis: Currently on therapeutic heparin drip  Code Status: Full Code  Functional Status: Independent  Diet: Regular  Melo: Not in place    Disposition Plan   Expected discharge in 1 days to prior living arrangement once monitored adequately given finding of submassive PE with multiple secondary complications..     Entered: Tonya Daley 07/25/2019, 1:40 PM        Discharge paperwork and summary completed.  Please check that she is tolerating all her BP medications and if not, please adjust discharge paperwork-thank you       Interval History (Subjective):      Feeling good today, slept well last night, no cp, breathing seems better, po intake is fair.                    Physical Exam:      Last Vital Signs:  /47 (BP Location: Left arm)   Pulse 98   Temp 97.9  F (36.6  C) (Oral)   Resp 20   Ht 1.676 m (5' 6\")   Wt 83.7 kg (184 lb 8 oz)   SpO2 93%   BMI 29.78 kg/m      I/O: Not being tracked  Telemetry: NSR    Pleasant no acute distress looks stated age head is normocephalic atraumatic and sclera are clear lungs are clear to auscultation she exhibits normal respiratory effort heart is of regular rate mildly tachycardic and rhythm without murmurs rubs or gallops no lower extremity edema, abdomen is soft nontender nondistended skin is warm and dry there is no cyanosis or clubbing of the extremities, her affect is appropriate she is alert and oriented           Medications:      All current medications were reviewed with changes reflected in problem list.         Data:      All new lab and imaging data was reviewed.   Labs:  Recent Labs   Lab 07/25/19  0723      POTASSIUM 3.9   CHLORIDE 110*   CO2 24   ANIONGAP 5   *   BUN 26   CR 0.71   GFRESTIMATED 82 "   GFRESTBLACK >90   ROSE 10.4*     Recent Labs   Lab 07/23/19  1748   NTBNPI 546     Recent Labs   Lab 07/25/19  0723   WBC 6.2   HGB 13.6   HCT 42.0   MCV 92        Recent Labs   Lab 07/25/19  0723 07/24/19  0717 07/23/19  1748   * 138* 128*     Recent Labs   Lab 07/24/19  0717 07/23/19  2226 07/23/19  1748   TROPI 0.258* 0.408* 0.386*      Imaging:     CT chest PE protocol 7/23/2019  IMPRESSION:  1. Large bilateral pulmonary emboli with suggestion of right heart  strain.  2. Cirrhotic appearance of the liver.    Bilateral lower extremity ultrasound 7/23/2019  FINDINGS: No evidence of lower extremity deep venous thrombosis in the  right lower extremity. There is occlusive thrombus involving the left  mid and proximal calf peroneal veins, with extension into the  popliteal vein    Echo 7/24/2019  Interpretation Summary     Left ventricular systolic function is normal.The visual ejection fraction is  estimated at 65%.  The right ventricle is mildly dilated.The right ventricular systolic function  is borderline reduced.  There is moderate (2+) tricuspid regurgitation.  Pulmonary hypertension- RVSP 49 mm hg +RA.

## 2019-07-25 NOTE — PLAN OF CARE
[id number]: Pt resting comfortably and slept well during the night. VSS. A&O. Wears CPAP. Denies pain. Heparin gtt infusing at 12.5ml/hr.Tele reads SR wit tall Ts. Transfers with Ax1. Will continue to monitor.

## 2019-07-25 NOTE — DISCHARGE SUMMARY
Discharge Summary  Hospitalist Service    Kelle Abdalla MRN# [id number]   YOB: 1940 Age: 78 year old     Date of Admission:  7/23/2019  Anticipated Date of Discharge:  7/26/2019  Admitting Physician:  Sameer Osman MD  Discharge Physician: Tonya Daley MD  Discharging Service: Hospitalist Service     Primary Provider: Elizabeth Díaz  Primary Care Physician Phone Number: [phone]         Discharge Diagnoses/Problem Oriented Hospital Course (Providers):    Kelle Abdalla was admitted on 7/23/2019 by Sameer Osman MD and I would refer you to their history and physical.  The following problems were addressed during her hospitalization:      Submassive PE  Elevated troponin  htn  hlp  T2dm  Cirrhotic appearing liver on CT  Mild hypercalcemia          Code Status:      Full Code        Brief Hospital Stay Summary Sent Home With Patient in AVS:       Summary of Stay: Kelle Abdalla is a 78 year old female with a history of HTN, HLD, type 2 diabetes, former smoker, hypercalcemia, and osteoarthritis who underwent partial left knee replacement on June 5, 2019 on aspirin 81 mg twice daily for DVT prophylaxis who presented with 2 days of progressive exertional dyspnea and generalized fatigue.  Evaluation is consistent with submassive PE with RV strain and elevated troponin.  She has been admitted to inpatient status on 7/23/2019 for anticoagulation and monitoring purposes.     She has done well with IV heparin and we will transition her to apixaban in preparation for discharge      Problem List:   1. Submassive PE: transitioned to oral apixaban today.  Has evidence of RV strain on CT scan as well as an elevated troponin.  BNP is surprisingly negative.  Ultrasound of the heart shows mild RV dilation moderate pulmonary hypertension. Did well on IV heparin, had some transient hypoxia that is improving.  Transition to apixaban  This seems like it was a provoked  clot in the setting of recent partial knee replacement surgery.  Given her age I did ask about age and sex related cancer screening and she states that she had her colonoscopy last year and she is does get a mammogram yearly and that is time is coming up soon.  I encouraged her to keep that up-to-date.  Would recommend 6 months of anticoagulation   2. Elevated troponin, sinus tachycardia, elevated lactic acid, mild hypoxia: All secondary to #1-and all resolving.  I do not think this represents CAD  3. Hypertension: PTA regimen: Losartan 50 mg p.o. daily, hydrochlorothiazide 12.5 mg p.o. daily, spironolactone 25 mg p.o. daily, and amlodipine 5 mg p.o. Initially on only amlodipine, losartan added 7/24, and hydrochlorothiazide added back in on 7/25  4. HLP: Continue pravastatin as at home  5. Type 2 diabetes: On metformin  mg daily and blood sugars under good control-no need to track with ISS  6. Cirrhotic appearing liver on CT scan: Incidental finding,LFTs wnl-and recommend follow-up with primary MD in the outpatient setting  DVT Prophylaxis: Currently on therapeutic heparin drip  Code Status: Full Code  Functional Status: Independent  Diet: Regular  Melo: Not in place             Important Results:      As noted below          Pending Results:        Unresulted Labs Ordered in the Past 30 Days of this Admission     No orders found from 6/23/2019 to 7/24/2019.            Discharge Instructions and Follow-Up:      Follow-up Appointments     Follow-up and recommended labs and tests       F/u with primary MD in 1-2 weeks  Your liver looked a bit scarred down on CT scanning but you do not have   evidence of any liver dysfunction               Discharge Disposition:      Discharged to home         Discharge Medications:        Current Discharge Medication List      START taking these medications    Details   !! apixaban ANTICOAGULANT (ELIQUIS) 5 MG tablet Take 2 tablets (10 mg) by mouth 2 times daily for 5  days  Qty: 20 tablet, Refills: 0    Associated Diagnoses: Other acute pulmonary embolism with acute cor pulmonale (H)      !! apixaban ANTICOAGULANT (ELIQUIS) 5 MG tablet Take 1 tablet (5 mg) by mouth 2 times daily  Qty: 60 tablet, Refills: 0    Associated Diagnoses: Other acute pulmonary embolism with acute cor pulmonale (H)       !! - Potential duplicate medications found. Please discuss with provider.      CONTINUE these medications which have NOT CHANGED    Details   AmLODIPine Besylate (NORVASC PO) Take 5 mg by mouth daily      losartan-hydrochlorothiazide (HYZAAR) 100-25 MG per tablet Take 1 tablet by mouth daily      MetFORMIN HCl (GLUCOPHAGE XR PO) Take 500 mg by mouth daily      Multiple Vitamins-Minerals (WOMENS MULTIVITAMIN PO) Take 1 tablet by mouth daily      Omega-3 Fatty Acids (FISH OIL PO) Take 1,000 mg by mouth 2 times daily       PRAVASTATIN SODIUM PO Take 40 mg by mouth every evening      triamcinolone (KENALOG) 0.1 % cream Apply topically daily as needed       VITAMIN D, CHOLECALCIFEROL, PO Take 2,000 Units by mouth daily         STOP taking these medications       aspirin (ASA) 81 MG chewable tablet Comments:   Reason for Stopping:                 Allergies:         Allergies   Allergen Reactions     Atorvastatin GI Disturbance     Pollen Extracts [Pollen Extract]            Consultations This Hospital Stay:      No consultations were requested during this admission           Discharge Time:      Greater than 30 minutes.        Image Results From This Hospital Stay (For Non-EPIC Providers):        Results for orders placed or performed during the hospital encounter of 07/23/19   CT Chest Pulmonary Embolism w Contrast     Value    Radiologist flags Pulmonary embolism (AA)    Narrative    CT CHEST PULMONARY EMBOLISM WITH CONTRAST   7/23/2019 7:27 PM     HISTORY: Shortness of breath.    TECHNIQUE:  69mL Isovue-370. Radiation dose for this scan was reduced  using automated exposure control,  adjustment of the mA and/or kV  according to patient size, or iterative reconstruction technique.    COMPARISON: None.    FINDINGS:  There are multiple large bilateral pulmonary emboli, right  greater than left. There is straightening of the interventricular  septum and right ventricular enlargement, suggestive of right heart  strain.    No pulmonary nodule or mass. No pneumothorax or pleural effusion. No  thoracic or axillary adenopathy.    The liver appears cirrhotic. A cyst is noted at the upper pole of the  left kidney. No suspicious bone lesions.      Impression    IMPRESSION:  1. Large bilateral pulmonary emboli with suggestion of right heart  strain.  2. Cirrhotic appearance of the liver.    [Critical Result: Pulmonary embolism]    Finding was identified on 7/23/2019 7:29 PM.     Dr. Garzon was contacted by me on 7/23/2019 7:33 PM and verbalized  understanding of the critical result.     LORRIE GARZON MD   US Lower Extremity Venous Duplex Bilateral    Narrative    US LOWER EXTREMITY VENOUS DUPLEX BILATERAL7/23/2019 9:05 PM    HISTORY: Pulmonary embolism, left lower extremity redness/swelling.    TECHNIQUE: Venous Doppler US.?Color flow and spectral Doppler with  waveform analysis performed.    FINDINGS: No evidence of lower extremity deep venous thrombosis in the  right lower extremity. There is occlusive thrombus involving the left  mid and proximal calf peroneal veins, with extension into the  popliteal vein.      Impression    IMPRESSION: Positive for left lower extremity DVT.    KWAME MAYNARD MD     Echo 7/24/19  Interpretation Summary     Left ventricular systolic function is normal.The visual ejection fraction is  estimated at 65%.  The right ventricle is mildly dilated.The right ventricular systolic function  is borderline reduced.  There is moderate (2+) tricuspid regurgitation.  Pulmonary hypertension- RVSP 49 mm hg +RA.         Most Recent Lab Results In EPIC (For Non-EPIC Providers):    Most  Recent 3 CBC's:  Recent Labs   Lab Test 07/25/19  0723 07/24/19  0717 07/23/19  1748   WBC 6.2 7.0 10.9   HGB 13.6 13.2 14.2   MCV 92 92 92    203 218      Most Recent 3 BMP's:  Recent Labs   Lab Test 07/25/19  0723 07/24/19  0717 07/23/19  1748    140 141   POTASSIUM 3.9 4.1 4.2   CHLORIDE 110* 108 108   CO2 24 23 27   BUN 26 21 27   CR 0.71 0.75 0.81   ANIONGAP 5 9 6   ROSE 10.4* 10.1 10.9*   * 138* 128*   Most Recent 3 INR's:  Recent Labs   Lab Test 07/23/19  1748   INR 0.98     Most Recent 2 LFT's:  Recent Labs   Lab Test 07/25/19 0723   AST 32   ALT 36   ALKPHOS 68   BILITOTAL 0.4

## 2019-07-25 NOTE — PLAN OF CARE
VSS, A/O. SBA to independent, calls appropriately.  Tele- SR with [place] [facility], d/c'd today.  LS-clear, dim bases.  Heparin gtt d/c'd and started on eliquis.  Likely to discharge tomorrow.

## 2019-07-26 VITALS
WEIGHT: 184.5 LBS | DIASTOLIC BLOOD PRESSURE: 59 MMHG | HEART RATE: 96 BPM | OXYGEN SATURATION: 95 % | RESPIRATION RATE: 20 BRPM | SYSTOLIC BLOOD PRESSURE: 128 MMHG | TEMPERATURE: 97.6 F | HEIGHT: 66 IN | BODY MASS INDEX: 29.65 KG/M2

## 2019-07-26 LAB
ANION GAP SERPL CALCULATED.3IONS-SCNC: 8 MMOL/L (ref 3–14)
BUN SERPL-MCNC: 21 MG/DL (ref 7–30)
CALCIUM SERPL-MCNC: 10.4 MG/DL (ref 8.5–10.1)
CHLORIDE SERPL-SCNC: 109 MMOL/L (ref 94–109)
CO2 SERPL-SCNC: 23 MMOL/L (ref 20–32)
CREAT SERPL-MCNC: 0.74 MG/DL (ref 0.52–1.04)
ERYTHROCYTE [DISTWIDTH] IN BLOOD BY AUTOMATED COUNT: 13.4 % (ref 10–15)
GFR SERPL CREATININE-BSD FRML MDRD: 79 ML/MIN/{1.73_M2}
GLUCOSE SERPL-MCNC: 130 MG/DL (ref 70–99)
HCT VFR BLD AUTO: 42 % (ref 35–47)
HGB BLD-MCNC: 13.8 G/DL (ref 11.7–15.7)
MCH RBC QN AUTO: 30.3 PG (ref 26.5–33)
MCHC RBC AUTO-ENTMCNC: 32.9 G/DL (ref 31.5–36.5)
MCV RBC AUTO: 92 FL (ref 78–100)
PLATELET # BLD AUTO: 197 10E9/L (ref 150–450)
POTASSIUM SERPL-SCNC: 4.2 MMOL/L (ref 3.4–5.3)
RBC # BLD AUTO: 4.55 10E12/L (ref 3.8–5.2)
SODIUM SERPL-SCNC: 140 MMOL/L (ref 133–144)
WBC # BLD AUTO: 6.3 10E9/L (ref 4–11)

## 2019-07-26 PROCEDURE — 25000132 ZZH RX MED GY IP 250 OP 250 PS 637: Performed by: INTERNAL MEDICINE

## 2019-07-26 PROCEDURE — 85027 COMPLETE CBC AUTOMATED: CPT | Performed by: INTERNAL MEDICINE

## 2019-07-26 PROCEDURE — 80048 BASIC METABOLIC PNL TOTAL CA: CPT | Performed by: INTERNAL MEDICINE

## 2019-07-26 PROCEDURE — 36415 COLL VENOUS BLD VENIPUNCTURE: CPT | Performed by: INTERNAL MEDICINE

## 2019-07-26 PROCEDURE — 99232 SBSQ HOSP IP/OBS MODERATE 35: CPT | Performed by: INTERNAL MEDICINE

## 2019-07-26 RX ADMIN — AMLODIPINE BESYLATE 5 MG: 5 TABLET ORAL at 08:55

## 2019-07-26 RX ADMIN — METFORMIN HYDROCHLORIDE 500 MG: 500 TABLET, EXTENDED RELEASE ORAL at 08:55

## 2019-07-26 RX ADMIN — LOSARTAN POTASSIUM 50 MG: 50 TABLET ORAL at 08:55

## 2019-07-26 RX ADMIN — APIXABAN 10 MG: 5 TABLET, FILM COATED ORAL at 08:55

## 2019-07-26 RX ADMIN — HYDROCHLOROTHIAZIDE 12.5 MG: 12.5 CAPSULE ORAL at 08:55

## 2019-07-26 ASSESSMENT — ACTIVITIES OF DAILY LIVING (ADL)
TRANSFERRING: 0-->INDEPENDENT
ADLS_ACUITY_SCORE: 13
ADLS_ACUITY_SCORE: 13
ADLS_ACUITY_SCORE: 14
RETIRED_EATING: 0-->INDEPENDENT
ADLS_ACUITY_SCORE: 13
AMBULATION: 1-->ASSISTIVE EQUIPMENT
RETIRED_COMMUNICATION: 0-->UNDERSTANDS/COMMUNICATES WITHOUT DIFFICULTY
DRESS: 0-->INDEPENDENT
SWALLOWING: 0-->SWALLOWS FOODS/LIQUIDS WITHOUT DIFFICULTY
FALL_HISTORY_WITHIN_LAST_SIX_MONTHS: NO
TOILETING: 0-->INDEPENDENT
BATHING: 0-->INDEPENDENT
COGNITION: 0 - NO COGNITION ISSUES REPORTED

## 2019-07-26 NOTE — DISCHARGE SUMMARY
Assumed care this AM.  Patient feeling well without new complaints.  Exam is stable.  Plan discharge today.  Please see discharge summary from Dr. Daley for full d/c details.  I also reviewed medications for discharge, follow-up, and anticoagulation with the patient.  Recommended 6 months anticoagulation.  I also explained the importance of not stopping/holding anticoagulation without speaking with her primary provider.  I also discussed her BP and medications with her.  She was on a reduced dose of losartan/HCTZ here but SBP is trending up near 140 now.  I will have her resume her prior home dose starting tomorrow following discharge.  She has a BP cuff at home and I recommended monitoring it daily for a few days and discussed numbers to watch for (high and low).    Plan:  Discharge home today    Final diagnosis:  1.  Submassive Pulmonary embolism with right heart strain  2.  Left leg DVT following orthopedic surgery  3.  Incidentally noted abnormal liver appearance on CT with normal range LFT's - recommend outpatient follow-up  4.  Mild incidentally noted hypercalcemia - recommend outpatient follow-up  5.  Hypertension

## 2019-07-26 NOTE — PLAN OF CARE
A&Ox4. VSS; 96% 02 on RA. LS dim bilaterally. Up independently in the room. Denies pain. Continues on oral Eliquis. Likely discharging today.

## 2019-07-26 NOTE — PLAN OF CARE
Pt A/O x 4, VSS, pt denies pain, headache, dizziness, n/v & SOB. Pt up independent. Lung sounds clear/diminished, RA. PO Eliquis. Discharge today. Will continue with plan of care.

## 2019-07-26 NOTE — PHARMACY - DISCHARGE MEDICATION RECONCILIATION AND EDUCATION
Discharge medication review for this patient is complete. Face-to-face medication education was provided by the pharmacist.  See [facility] for allergy information and immunization status.   Pharmacist assisted with medication reconciliation of discharge medications with PTA medications.    Patient was educated on the following for each discharge medication:   Rationale for therapy  Duration of treatment  Dosing and or monitoring drug levels  Common side effects  Importance of compliance  Drug/food interactions  Missed doses  Self monitoring parameters    Left written materials and instructions (Clinical notes from [facility]) for new medications: Yes    Educated patient on the increased risk of bleeding with Eliquis and fish oil. Informed patient to talk to her PCP about continuing fish oil while on Eliquis    OUTCOMES: Patient verbalized understanding    IMPORTANT FOLLOW UP NOTES:   - Informed patient of PCP follow-up in 1 to 2 weeks.     Discharge Medication List     Review of your medicines      START taking      Dose / Directions   * apixaban ANTICOAGULANT 5 MG tablet  Commonly known as:  ELIQUIS      Dose:  10 mg  Take 2 tablets (10 mg) by mouth 2 times daily for 5 days  Quantity:  20 tablet  Refills:  0     * apixaban ANTICOAGULANT 5 MG tablet  Commonly known as:  ELIQUIS      Dose:  5 mg  Start taking on:  8/1/2019  Take 1 tablet (5 mg) by mouth 2 times daily  Quantity:  60 tablet  Refills:  0         * This list has 2 medication(s) that are the same as other medications prescribed for you. Read the directions carefully, and ask your doctor or other care provider to review them with you.            CONTINUE these medicines which have NOT CHANGED      Dose / Directions   FISH OIL PO      Dose:  1000 mg  Take 1,000 mg by mouth 2 times daily  Refills:  0     GLUCOPHAGE XR PO      Dose:  500 mg  Take 500 mg by mouth daily  Refills:  0     losartan-hydrochlorothiazide 100-25 MG tablet  Commonly known as:  HYZAAR      Dose:   1 tablet  Take 1 tablet by mouth daily  Refills:  0     NORVASC PO      Dose:  5 mg  Take 5 mg by mouth daily  Refills:  0     PRAVASTATIN SODIUM PO      Dose:  40 mg  Take 40 mg by mouth every evening  Refills:  0     triamcinolone 0.1 % external cream  Commonly known as:  KENALOG      Apply topically daily as needed  Refills:  0     VITAMIN D (CHOLECALCIFEROL) PO      Dose:  2000 Units  Take 2,000 Units by mouth daily  Refills:  0     WOMENS MULTIVITAMIN PO      Dose:  1 tablet  Take 1 tablet by mouth daily  Refills:  0        STOP taking    aspirin 81 MG chewable tablet  Commonly known as:  ASA              Where to get your medicines      These medications were sent to [facility] [address] [facility]  [address] [facility] [address]    Phone:  [phone]     apixaban ANTICOAGULANT 5 MG tablet    apixaban ANTICOAGULANT 5 MG tablet

## 2019-07-26 NOTE — PROGRESS NOTES
DC instructions given to pt, verbalized understanding.  All belongings with pt, IV DC'd and documented.     Pt left unit via wheelchair with son.

## 2019-08-06 ENCOUNTER — THERAPY VISIT (OUTPATIENT)
Dept: PHYSICAL THERAPY | Facility: CLINIC | Age: 79
End: 2019-08-06
Payer: COMMERCIAL

## 2019-08-06 DIAGNOSIS — N39.46 MIXED INCONTINENCE: Primary | ICD-10-CM

## 2019-08-06 PROCEDURE — 97112 NEUROMUSCULAR REEDUCATION: CPT | Mod: GP | Performed by: PHYSICAL THERAPIST

## 2019-08-06 PROCEDURE — 97110 THERAPEUTIC EXERCISES: CPT | Mod: GP | Performed by: PHYSICAL THERAPIST

## 2019-08-13 ENCOUNTER — THERAPY VISIT (OUTPATIENT)
Dept: PHYSICAL THERAPY | Facility: CLINIC | Age: 79
End: 2019-08-13
Payer: COMMERCIAL

## 2019-08-13 DIAGNOSIS — K59.00 CONSTIPATION, UNSPECIFIED CONSTIPATION TYPE: ICD-10-CM

## 2019-08-13 DIAGNOSIS — N39.46 MIXED INCONTINENCE: Primary | ICD-10-CM

## 2019-08-13 PROCEDURE — 97535 SELF CARE MNGMENT TRAINING: CPT | Mod: GP | Performed by: PHYSICAL THERAPIST

## 2019-08-13 PROCEDURE — 97110 THERAPEUTIC EXERCISES: CPT | Mod: GP | Performed by: PHYSICAL THERAPIST

## 2019-08-13 NOTE — PROGRESS NOTES
Subjective:  HPI                    Objective:  System    Physical Exam    General     ROS    Assessment/Plan:    PROGRESS  REPORT    Progress reporting period is from 08/13/19.       SUBJECTIVE  Subjective changes noted by patient:  .  Subjective: 10 min late. Patient would like to continue with PT as it keeps her on track with doing exercises. BM's pretty regular at 1x day, depends on what eats the consistency will be more runny (like yogurt). Occasional urgency was quite strong but is definitely diet related. Up at night 2 x to void. Does have some urgency/urge incontinence with getting into house, then leakage stops, able to empty rest at home.     Current pain level is NA  .     Previous pain level was  NA  .   Changes in function:  Yes (See Goal flowsheet attached for changes in current functional level)  Adverse reaction to treatment or activity: None    OBJECTIVE  Changes noted in objective findings:    Objective: Discussed urge suppression for trigger of pulling into garage. Kegel strength 2/5. Needs verbal/manual cues to avoid glute max and pelvic tilt activation.     ASSESSMENT/PLAN  Updated problem list and treatment plan: Diagnosis 1:  Mixed incontinence  Decreased ROM/flexibility - manual therapy and therapeutic exercise  Decreased strength - therapeutic exercise and therapeutic activities  Decreased proprioception - neuro re-education and therapeutic activities  Impaired gait - gait training  Impaired muscle performance - neuro re-education  Decreased function - therapeutic activities  Impaired posture - neuro re-education  STG/LTGs have been met or progress has been made towards goals:  Yes (See Goal flow sheet completed today.)  Assessment of Progress: The patient's condition is improving.  Self Management Plans:  Patient has been instructed in a home treatment program.  Patient  has been instructed in self management of symptoms.    Kelle continues to require the following intervention to meet  STG and LTG's:  PT    Recommendations:  This patient would benefit from continued therapy.     Frequency:  2 X a month, once daily  Duration:  for 2 months        Please refer to the daily flowsheet for treatment today, total treatment time and time spent performing 1:1 timed codes.

## 2019-08-13 NOTE — LETTER
[facility]  [address]  [address]  [place] [address]  [phone]    2019    Re: Kelle Abdalla   :   1940  MRN:  [id number]   REFERRING PHYSICIAN:   Zenia MCKEON [place] [facility]  Date of Initial Evaluation:  19  Visits:  Rxs Used: 7  Reason for Referral:     Mixed incontinence  Constipation, unspecified constipation type    PROGRESS  REPORT  Progress reporting period is from 19.       SUBJECTIVE  Subjective changes noted by patient:  .  Subjective: 10 min late. Patient would like to continue with PT as it keeps her on track with doing exercises. BM's pretty regular at 1x day, depends on what eats the consistency will be more runny (like yogurt). Occasional urgency was quite strong but is definitely diet related. Up at night 2 x to void. Does have some urgency/urge incontinence with getting into house, then leakage stops, able to empty rest at home.     Current pain level is NA  .     Previous pain level was  NA  .   Changes in function:  Yes (See Goal flowsheet attached for changes in current functional level)  Adverse reaction to treatment or activity: None    OBJECTIVE  Changes noted in objective findings:    Objective: Discussed urge suppression for trigger of pulling into garage. Kegel strength 2/5. Needs verbal/manual cues to avoid glute max and pelvic tilt activation.     ASSESSMENT/PLAN  Updated problem list and treatment plan: Diagnosis 1:  Mixed incontinence  Decreased ROM/flexibility - manual therapy and therapeutic exercise  Decreased strength - therapeutic exercise and therapeutic activities  Decreased proprioception - neuro re-education and therapeutic activities  Impaired gait - gait training  Impaired muscle performance - neuro re-education  Decreased function - therapeutic activities  Impaired posture - neuro re-education  STG/LTGs have been met or progress has been made towards goals:  Yes (See Goal flow sheet completed  today.)  Assessment of Progress: The patient's condition is improving.  Self Management Plans:  Patient has been instructed in a home treatment program.  Patient  has been instructed in self management of symptoms.  Re: Kelle Abdalla   :   1940      Kelle continues to require the following intervention to meet STG and LTG's:  PT    Recommendations:  This patient would benefit from continued therapy.     Frequency:  2 X a month, once daily  Duration:  for 2 months    Thank you for your referral.    INQUIRIES  Therapist: Karla Cline, PT, OCS  [facility]  [address] [place], [place] [id number]  Phone: [phone]  Fax: [phone]

## 2019-08-20 ENCOUNTER — THERAPY VISIT (OUTPATIENT)
Dept: PHYSICAL THERAPY | Facility: CLINIC | Age: 79
End: 2019-08-20
Payer: COMMERCIAL

## 2019-08-20 DIAGNOSIS — N39.46 MIXED INCONTINENCE: Primary | ICD-10-CM

## 2019-08-20 DIAGNOSIS — K59.00 CONSTIPATION, UNSPECIFIED CONSTIPATION TYPE: ICD-10-CM

## 2019-08-20 PROCEDURE — 97110 THERAPEUTIC EXERCISES: CPT | Mod: GP | Performed by: PHYSICAL THERAPIST

## 2019-08-20 PROCEDURE — 97112 NEUROMUSCULAR REEDUCATION: CPT | Mod: GP | Performed by: PHYSICAL THERAPIST

## 2019-08-27 ENCOUNTER — TRANSFERRED RECORDS (OUTPATIENT)
Dept: HEALTH INFORMATION MANAGEMENT | Facility: CLINIC | Age: 79
End: 2019-08-27

## 2019-09-03 ENCOUNTER — THERAPY VISIT (OUTPATIENT)
Dept: PHYSICAL THERAPY | Facility: CLINIC | Age: 79
End: 2019-09-03
Payer: COMMERCIAL

## 2019-09-03 DIAGNOSIS — N39.46 MIXED INCONTINENCE: Primary | ICD-10-CM

## 2019-09-03 PROCEDURE — 97535 SELF CARE MNGMENT TRAINING: CPT | Mod: GP | Performed by: PHYSICAL THERAPIST

## 2019-09-03 PROCEDURE — 97112 NEUROMUSCULAR REEDUCATION: CPT | Mod: GP | Performed by: PHYSICAL THERAPIST

## 2019-09-03 PROCEDURE — 97110 THERAPEUTIC EXERCISES: CPT | Mod: GP | Performed by: PHYSICAL THERAPIST

## 2019-09-05 ENCOUNTER — APPOINTMENT (OUTPATIENT)
Dept: CT IMAGING | Facility: CLINIC | Age: 79
End: 2019-09-05
Attending: EMERGENCY MEDICINE
Payer: COMMERCIAL

## 2019-09-05 ENCOUNTER — HOSPITAL ENCOUNTER (EMERGENCY)
Facility: CLINIC | Age: 79
Discharge: HOME | End: 2019-09-05
Attending: EMERGENCY MEDICINE | Admitting: EMERGENCY MEDICINE
Payer: COMMERCIAL

## 2019-09-05 VITALS
RESPIRATION RATE: 14 BRPM | DIASTOLIC BLOOD PRESSURE: 57 MMHG | SYSTOLIC BLOOD PRESSURE: 113 MMHG | BODY MASS INDEX: 29.96 KG/M2 | HEART RATE: 82 BPM | TEMPERATURE: 97.9 F | WEIGHT: 185.63 LBS | OXYGEN SATURATION: 93 %

## 2019-09-05 DIAGNOSIS — S00.31XA ABRASION OF NOSE, INITIAL ENCOUNTER: ICD-10-CM
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
## 2019-09-05 DIAGNOSIS — S70.11XA CONTUSION OF RIGHT THIGH, INITIAL ENCOUNTER: ICD-10-CM

## 2019-09-05 PROCEDURE — 99284 EMERGENCY DEPT VISIT MOD MDM: CPT | Mod: 25

## 2019-09-05 PROCEDURE — 70450 CT HEAD/BRAIN W/O DYE: CPT

## 2019-09-05 ASSESSMENT — ENCOUNTER SYMPTOMS
ABDOMINAL PAIN: 0
MYALGIAS: 1
WOUND: 1
ARTHRALGIAS: 1

## 2019-09-05 NOTE — ED TRIAGE NOTES
"A&O x4, ABCs intact. Pt presents after fall. Pt state she was \"attempting to go to the bathroom and I was trying to do too many things at once and I tripped up and fell.\" Pt states she hit her right hip and the right side of the bridge of her nose. Pt is on blood thinners. Pt denies hitting any other part of her head.  "

## 2019-09-05 NOTE — ED AVS SNAPSHOT
[facility]  [address]  [facility] [address]  Phone:  [phone]  Fax:  [phone]                                    Kelle Abdalla   MRN: [id number]    Department:  [facility]   Date of Visit:  9/5/2019           After Visit Summary Signature Page    I have received my discharge instructions, and my questions have been answered. I have discussed any challenges I see with this plan with the nurse or doctor.    ..........................................................................................................................................  Patient/Patient Representative Signature      ..........................................................................................................................................  Patient Representative Print Name and Relationship to Patient    ..................................................               ................................................  Date                                   Time    ..........................................................................................................................................  Reviewed by Signature/Title    ...................................................              ..............................................  Date                                               Time          22EPIC Rev 08/18

## 2019-09-06 NOTE — ED PROVIDER NOTES
History     Chief Complaint:  Fall    HPI   Kelle Abdalla is a 78 year old female, with a history of hypertension, hyperlipidemia and diabetes, who is currently on Eliquis, who presents to the ED for evaluation following a fall. The patient states that she was trying to go to the bathroom when she slipped and fell, landing on the toilet seat. The patient states that she hit her right arm, right thigh, and the bridge of her nose which bled profusely due to her being on Eliquis. The patient reports that the fall happened 1.5 hours prior to presentation to the ED, and reports that she was able to ambulate. The patient denies any abdominal pain or syncope.     Allergies:  Atorvastatin, GI disturbance  Ibuprofen, unknown     Medications:    Norvasc  Eliquis  Hyzaar  Metformin  Fish oil  Pravastatin sodium PO  Vitamin D    Past Medical History:    Anxiety  Depression  Diabetes  GERD  Hyperlipidemia  Hypertension  Overactive bladder  Psoriasis  Sleep apnea  Tubular adenoma  Cystocele with prolapse  Acute PE    Past Surgical History:    Cholecystectomy  DaVinci sacrocolpopexy, mid urethral sling, cystoscopy  Bladder surgery  Laparoscopic hysterectomy supracervical, bilateral salpingo-oophorectomy, combined  Bunionectomy  Arthroscopy knee    Family History:    No past pertinent family history.    Social History:  Former smoker, quit 1/1/1977.  Positive for alcohol use.   Negative for drug use.  Marital Status:   [2]     Review of Systems   Gastrointestinal: Negative for abdominal pain.   Musculoskeletal: Positive for arthralgias and myalgias. Negative for gait problem.   Skin: Positive for wound.   Neurological: Negative for syncope.   All other systems reviewed and are negative.    Physical Exam     Patient Vitals for the past 24 hrs:   BP Temp Temp src Pulse Heart Rate Resp SpO2 Weight   09/05/19 2000 114/63 -- -- 80 -- -- 98 % --   09/05/19 1853 (!) 146/98 97.9  F (36.6  C) Oral -- 102 14 96 % 84.2 kg  (185 lb 10 oz)     Physical Exam  General: Patient is alert and cooperative.  HENT:  Nasal abrasion involving bridge of right side of nose; no deformity, no epistaxis.   Eyes: EOMI. Normal conjunctiva.  Neck:  Normal range of motion and appearance. No tenderness.   Cardiovascular:  Normal rate.   Pulmonary/Chest:  Effort normal.  Abdominal: Soft. No tenderness.     Musculoskeletal: Normal range of motion. No edema or tenderness.   Neurological: oriented, normal strength, sensation, and coordination.   Skin: Warm and dry. No rash or bruising.   Psychiatric: Normal mood and affect. Normal behavior and judgement.      Emergency Department Course   Imaging:  Radiographic findings were communicated with the patient who voiced understanding of the findings.  CT Head without contrast:   Negative as per radiology.    Emergency Department Course:  Nursing notes and vitals reviewed. (1949) I performed an exam of the patient as documented above.     IV inserted. Medicine administered as documented above. Blood drawn. This was sent to the lab for further testing, results above.     The patient was sent for a head CT while in the emergency department, findings above.     I rechecked the patient and discussed the results of her workup thus far.     Findings and plan explained to the Patient. Patient discharged home with instructions regarding supportive care, medications, and reasons to return. The importance of close follow-up was reviewed.     Impression & Plan    Medical Decision Making:  This is a 78-year-old female who suffered a mechanical fall shortly before arrival in which she did strike her nose and right thigh on a toilet in the bathroom.  There was no loss of consciousness.  She is anticoagulated on Eliquis.  Exam shows an abrasion to the right nose but no clinical concern for fracture.  Her cervical spine has been cleared clinically and she has no other additional significant injuries.  There is no concern for an  extremity fracture.  Testing was limited to a noncontrast head CT which demonstrates no intracranial hemorrhage or facial bone fracture.  No further testing is necessary.  She is being discharged with reassurance.  Local wound cares were advised for her nasal abrasion.    Diagnosis:    ICD-10-CM    1. Fall, initial encounter W19.XXXA    2. Abrasion of nose, initial encounter S00.31XA    3. Contusion of right thigh, initial encounter S70.11XA        Disposition:  The patient was discharged to home.    Discharge Medications:  New Prescriptions    No medications on file     Scribe Disclosure:  I, Alana Jj, am serving as a scribe on 9/5/2019 at 7:49 PM to personally document services performed by Anthony Smith MD based on my observations and the provider's statements to me.     Alana Jj  9/5/2019   [facility] EMERGENCY DEPARTMENT       Anthony Smith MD  09/06/19 2024

## 2019-09-09 ENCOUNTER — TRANSFERRED RECORDS (OUTPATIENT)
Dept: HEALTH INFORMATION MANAGEMENT | Facility: CLINIC | Age: 79
End: 2019-09-09

## 2019-09-17 ENCOUNTER — THERAPY VISIT (OUTPATIENT)
Dept: PHYSICAL THERAPY | Facility: CLINIC | Age: 79
End: 2019-09-17
Payer: COMMERCIAL

## 2019-09-17 DIAGNOSIS — N39.46 MIXED INCONTINENCE: Primary | ICD-10-CM

## 2019-09-17 DIAGNOSIS — K59.00 CONSTIPATION, UNSPECIFIED CONSTIPATION TYPE: ICD-10-CM

## 2019-09-17 PROCEDURE — 97110 THERAPEUTIC EXERCISES: CPT | Mod: GP | Performed by: PHYSICAL THERAPIST

## 2019-09-17 PROCEDURE — 97535 SELF CARE MNGMENT TRAINING: CPT | Mod: GP | Performed by: PHYSICAL THERAPIST

## 2019-09-30 ENCOUNTER — APPOINTMENT (OUTPATIENT)
Dept: CT IMAGING | Facility: CLINIC | Age: 79
End: 2019-09-30
Attending: EMERGENCY MEDICINE
Payer: COMMERCIAL

## 2019-09-30 ENCOUNTER — ALLIED HEALTH/NURSE VISIT (OUTPATIENT)
Dept: NURSING | Facility: CLINIC | Age: 79
End: 2019-09-30
Payer: COMMERCIAL

## 2019-09-30 ENCOUNTER — HOSPITAL ENCOUNTER (EMERGENCY)
Facility: CLINIC | Age: 79
Discharge: HOME | End: 2019-09-30
Attending: EMERGENCY MEDICINE | Admitting: EMERGENCY MEDICINE
Payer: COMMERCIAL

## 2019-09-30 VITALS
OXYGEN SATURATION: 96 % | DIASTOLIC BLOOD PRESSURE: 62 MMHG | TEMPERATURE: 98.2 F | SYSTOLIC BLOOD PRESSURE: 140 MMHG | RESPIRATION RATE: 18 BRPM | HEART RATE: 90 BPM

## 2019-09-30 VITALS
HEART RATE: 90 BPM | DIASTOLIC BLOOD PRESSURE: 81 MMHG | TEMPERATURE: 97.9 F | RESPIRATION RATE: 16 BRPM | SYSTOLIC BLOOD PRESSURE: 125 MMHG | OXYGEN SATURATION: 98 %

## 2019-09-30 DIAGNOSIS — N28.9 RENAL INSUFFICIENCY: ICD-10-CM

## 2019-09-30 DIAGNOSIS — R20.2 TINGLING OF LEFT UPPER EXTREMITY: ICD-10-CM

## 2019-09-30 DIAGNOSIS — R42 DIZZINESS: ICD-10-CM

## 2019-09-30 DIAGNOSIS — R11.0 NAUSEA: ICD-10-CM

## 2019-09-30 DIAGNOSIS — R42 LIGHTHEADEDNESS: Primary | ICD-10-CM

## 2019-09-30 LAB
ALBUMIN SERPL-MCNC: 3.7 G/DL (ref 3.4–5)
ALBUMIN UR-MCNC: NEGATIVE MG/DL
ALP SERPL-CCNC: 60 U/L (ref 40–150)
ALT SERPL W P-5'-P-CCNC: 56 U/L (ref 0–50)
ANION GAP SERPL CALCULATED.3IONS-SCNC: 6 MMOL/L (ref 3–14)
APPEARANCE UR: CLEAR
AST SERPL W P-5'-P-CCNC: 31 U/L (ref 0–45)
BACTERIA #/AREA URNS HPF: ABNORMAL /HPF
BASOPHILS # BLD AUTO: 0 10E9/L (ref 0–0.2)
BASOPHILS NFR BLD AUTO: 0.6 %
BILIRUB SERPL-MCNC: 0.3 MG/DL (ref 0.2–1.3)
BILIRUB UR QL STRIP: NEGATIVE
BUN SERPL-MCNC: 17 MG/DL (ref 7–30)
CALCIUM SERPL-MCNC: 10.4 MG/DL (ref 8.5–10.1)
CHLORIDE SERPL-SCNC: 109 MMOL/L (ref 94–109)
CO2 SERPL-SCNC: 28 MMOL/L (ref 20–32)
COLOR UR AUTO: ABNORMAL
CREAT SERPL-MCNC: 1.13 MG/DL (ref 0.52–1.04)
DIFFERENTIAL METHOD BLD: NORMAL
EOSINOPHIL # BLD AUTO: 0.2 10E9/L (ref 0–0.7)
EOSINOPHIL NFR BLD AUTO: 2.3 %
ERYTHROCYTE [DISTWIDTH] IN BLOOD BY AUTOMATED COUNT: 13.3 % (ref 10–15)
GFR SERPL CREATININE-BSD FRML MDRD: 47 ML/MIN/{1.73_M2}
GLUCOSE SERPL-MCNC: 123 MG/DL (ref 70–99)
GLUCOSE UR STRIP-MCNC: NEGATIVE MG/DL
HCT VFR BLD AUTO: 40 % (ref 35–47)
HGB BLD-MCNC: 13.4 G/DL (ref 11.7–15.7)
HGB UR QL STRIP: NEGATIVE
IMM GRANULOCYTES # BLD: 0 10E9/L (ref 0–0.4)
IMM GRANULOCYTES NFR BLD: 0.3 %
KETONES UR STRIP-MCNC: NEGATIVE MG/DL
LEUKOCYTE ESTERASE UR QL STRIP: ABNORMAL
LYMPHOCYTES # BLD AUTO: 2.6 10E9/L (ref 0.8–5.3)
LYMPHOCYTES NFR BLD AUTO: 37.5 %
MCH RBC QN AUTO: 30.5 PG (ref 26.5–33)
MCHC RBC AUTO-ENTMCNC: 33.5 G/DL (ref 31.5–36.5)
MCV RBC AUTO: 91 FL (ref 78–100)
MONOCYTES # BLD AUTO: 0.7 10E9/L (ref 0–1.3)
MONOCYTES NFR BLD AUTO: 10.2 %
MUCOUS THREADS #/AREA URNS LPF: PRESENT /LPF
NEUTROPHILS # BLD AUTO: 3.4 10E9/L (ref 1.6–8.3)
NEUTROPHILS NFR BLD AUTO: 49.1 %
NITRATE UR QL: NEGATIVE
NRBC # BLD AUTO: 0 10*3/UL
NRBC BLD AUTO-RTO: 0 /100
PH UR STRIP: 6 PH (ref 5–7)
PLATELET # BLD AUTO: 196 10E9/L (ref 150–450)
POTASSIUM SERPL-SCNC: 3.7 MMOL/L (ref 3.4–5.3)
PROT SERPL-MCNC: 7 G/DL (ref 6.8–8.8)
RBC # BLD AUTO: 4.39 10E12/L (ref 3.8–5.2)
RBC #/AREA URNS AUTO: <1 /HPF (ref 0–2)
SODIUM SERPL-SCNC: 143 MMOL/L (ref 133–144)
SOURCE: ABNORMAL
SP GR UR STRIP: 1.02 (ref 1–1.03)
SQUAMOUS #/AREA URNS AUTO: <1 /HPF (ref 0–1)
TROPONIN I SERPL-MCNC: <0.015 UG/L (ref 0–0.04)
UROBILINOGEN UR STRIP-MCNC: NORMAL MG/DL (ref 0–2)
WBC # BLD AUTO: 6.9 10E9/L (ref 4–11)
WBC #/AREA URNS AUTO: 1 /HPF (ref 0–5)

## 2019-09-30 PROCEDURE — 99285 EMERGENCY DEPT VISIT HI MDM: CPT | Mod: 25

## 2019-09-30 PROCEDURE — 70450 CT HEAD/BRAIN W/O DYE: CPT

## 2019-09-30 PROCEDURE — 96361 HYDRATE IV INFUSION ADD-ON: CPT

## 2019-09-30 PROCEDURE — 93005 ELECTROCARDIOGRAM TRACING: CPT

## 2019-09-30 PROCEDURE — 80053 COMPREHEN METABOLIC PANEL: CPT | Performed by: EMERGENCY MEDICINE

## 2019-09-30 PROCEDURE — 84484 ASSAY OF TROPONIN QUANT: CPT | Performed by: EMERGENCY MEDICINE

## 2019-09-30 PROCEDURE — 81001 URINALYSIS AUTO W/SCOPE: CPT | Performed by: EMERGENCY MEDICINE

## 2019-09-30 PROCEDURE — 25000128 H RX IP 250 OP 636: Performed by: EMERGENCY MEDICINE

## 2019-09-30 PROCEDURE — 85025 COMPLETE CBC W/AUTO DIFF WBC: CPT | Performed by: EMERGENCY MEDICINE

## 2019-09-30 PROCEDURE — 25000125 ZZHC RX 250: Performed by: EMERGENCY MEDICINE

## 2019-09-30 PROCEDURE — 99207 ZZC NO CHARGE NURSE ONLY: CPT

## 2019-09-30 PROCEDURE — 70498 CT ANGIOGRAPHY NECK: CPT

## 2019-09-30 PROCEDURE — 96360 HYDRATION IV INFUSION INIT: CPT | Mod: 59

## 2019-09-30 RX ORDER — IOPAMIDOL 755 MG/ML
500 INJECTION, SOLUTION INTRAVASCULAR ONCE
Status: COMPLETED | OUTPATIENT
Start: 2019-09-30 | End: 2019-09-30

## 2019-09-30 RX ADMIN — SODIUM CHLORIDE 1000 ML: 9 INJECTION, SOLUTION INTRAVENOUS at 16:03

## 2019-09-30 RX ADMIN — SODIUM CHLORIDE 80 ML: 9 INJECTION, SOLUTION INTRAVENOUS at 16:59

## 2019-09-30 RX ADMIN — IOPAMIDOL 70 ML: 755 INJECTION, SOLUTION INTRAVENOUS at 16:59

## 2019-09-30 ASSESSMENT — ENCOUNTER SYMPTOMS
PALPITATIONS: 0
FEVER: 0
NAUSEA: 1
VOMITING: 0
DIZZINESS: 1

## 2019-09-30 NOTE — NURSING NOTE
Huddled with POD, Romina Deutsch. She is in agreement that patient should be assessed at ED.    Returned to patient. Reviewed her call with Mitch Triage nurse at which time (12pm today) ED was recommended for these symptoms. Informed her that the ED is the only place where cardiac involvement can be definitively ruled out. Patient stated understanding. She was hoping to avoid the cost of an ED visit but since two clinic systems recommend this advice now, she will have her friend drive her there. She also mentioned she will stop at home to  her pill bottles so they know exactly what medications she is taking.    Karla SUN, Triage RN

## 2019-09-30 NOTE — ED AVS SNAPSHOT
[facility] Emergency Department  [address]  [facility] [address]  Phone:  [phone]  Fax:  [phone]                                    Kelle Abdalla   MRN: [id number]    Department:  [facility] Emergency Department   Date of Visit:  9/30/2019           After Visit Summary Signature Page    I have received my discharge instructions, and my questions have been answered. I have discussed any challenges I see with this plan with the nurse or doctor.    ..........................................................................................................................................  Patient/Patient Representative Signature      ..........................................................................................................................................  Patient Representative Print Name and Relationship to Patient    ..................................................               ................................................  Date                                   Time    ..........................................................................................................................................  Reviewed by Signature/Title    ...................................................              ..............................................  Date                                               Time          22EPIC Rev 08/18

## 2019-09-30 NOTE — DISCHARGE INSTRUCTIONS
Your treatment plan includes:    1) calling your PCP for followup in the next 2-3 days.    2) tcontinue to take your eliquis    3) come back if you get worse    Reasons to return: chest pain, shortness of breath, nausea/vomiting, bleeding, confusion, blood in stools, dizziness, passing out, increasing headache, weakness, inability to walk.  Also return if increasing cough, difficulty breathing, nausea/vomiting, confusion, increasing abdominal pain.

## 2019-09-30 NOTE — ED TRIAGE NOTES
"Patient presents to the ED with dizziness. States has been having intermittent dizziness for \"awhile\" but today is worse and lasting longer than usual.   "

## 2019-09-30 NOTE — ED PROVIDER NOTES
History     Chief Complaint:  Dizziness      HPI   Kelle Abdalla is a 78 year old female with a history of DVT and PE on Eliquis who presents with dizziness. The patient reports having dizziness for a while that comes and goes. Today, her dizziness was markedly worse and partnered with nausea, prompting her to report to clinic and then to the Emergency Department. Her dizziness has persisted throughout the day though is not described as feeling like the room is spinning. The patient also reports tingling in her left hand this morning. She has not been seen for these symptoms before. She denies vision changes, vomiting, fever, loss of consciousness, or palpitations. Of note, her spironolactone dose was cut in half about 10 days ago when the patient went to her PCP Dr Díaz complaining of similar symptoms.  Tingling has now resolved.  No trauma.    She has been having problems with lightheadedness/nausea and has been seen in the past for this, but today it was worse and she had some left hand tingling about 2 hours ago, but is not feeling it now. States she was not leaning on it or cutting off circulation. She is currently feeling light headedness, but not nauseous - states the nausea went away when she had something to eat. Also has a headache. Patient additionally states she doesn't drink much water because she has an incontinence issue. Discussed importance of staying well hydrated and to f/u with      States she saw a provider at [facility] who reduced her hydrochlorothiazide from 25mg to 12.5mg. When attempted to review medications with this RN, patient did not know them off the top of her head, but thought the list was fairly complete, stated there were too many to keep track of.      At [facility] visit, it was noted the lightheadedness lasts about a 1/2 hour and comes and goes. Today, patient states it is more constant.   Patient is anticoagulated with Eliquis for acute pulmonary embolism with acute cor  pulmonale. No bruising or bleeding noted.       Allergies:  Atorvastatin  Ibuprofen  Pollen     Medications:    Amlodipine   Eliquis  losartan-hydrochlorothiazide   Metformin  Pravastatin    Past Medical History:  PE  Sleep apnea  Type 2 diabetes   Anxiety  GERD  Depression  Psoriasis  Hypertension  Hyperlipidemia  Osteoarthritis    Past Surgical History:    Cholecystectomy  DaVinci sacrocolpopexy, cystoscopy, and midurethral sling  Bladder surgery  Laparoscopic hysterectomy supracervical bilateral salpingo--oophorectomy  bunionectomy  Knee arthroscopy    Family History:    No past pertinent family history.    Social History:  Negative for current tobacco use - quit 43 years ago  Alcohol use: 1 to 2 weekly  Marital Status:   [2]    Review of Systems   Constitutional: Negative for fever.   Eyes: Negative for visual disturbance.   Cardiovascular: Negative for palpitations.   Gastrointestinal: Positive for nausea. Negative for vomiting.   Neurological: Positive for dizziness. Negative for syncope.   All other systems reviewed and are negative.    No fevers.  No chest pain.  No SOB or windedness.  Patient in on eliquis and taking her medications.    Physical Exam     Patient Vitals for the past 24 hrs:   BP Temp Pulse Resp SpO2   09/30/19 1730 125/81 -- 90 -- 98 %   09/30/19 1630 (!) 145/82 -- 93 -- 98 %   09/30/19 1615 (!) 146/73 -- 89 -- 91 %   09/30/19 1610 133/68 -- 81 -- 96 %   09/30/19 1550 (!) 152/75 97.9  F (36.6  C) 90 16 96 %       Lying Orthostatic BP  Lying Orthostatic BP: 133/68 Lying Orthostatic Pulse: 81 bpm  Sitting Orthostatic BP  Sitting Orthostatic BP: 147/76 Sitting Orthostatic Pulse: 80 bpm  Standing Orthostatic BP  Standing Orthostatic BP: 146/73 Standing Orthostatic Pulse: 89 bpm    Physical Exam  GEN: patient smiling, no distress  HEAD: atraumatic, normocephalic, no  cephalohemaoma  EYES: pupils reactive (3plus to 2plus), extraocular muscles intact, conjunctivae normal  ENT: TMs flat and  white bilaterally, oropharynx normal with no erythema or exudate, mucus membranes moist  NECK: no posterior midline tenderness, no meningeal signs, trachea midline, no carotid bruits or JVD  RESPIRATORY: no tachypnea, breath sounds clear to auscultation (no rales, wheezes, rhonchi), chest wall nontender, normal phonation  CVS: normal S1/S2, II/VI systolic ej murmurs, no rubs/gallops  ABDOMEN: soft, nontender, no masses or organomegaly, no rebound, positive bowel sounds  BACK:  no spinal tenderness  EXTREMITIES: intact pulses x 4, full range of motion at joints, no edema  MUSCULOSKELETAL: no deformities  SKIN: warm and dry, no acute rashes   NEURO: GCS 15, cranial nerves intact.  Motor- moves all 4 extremities with 5/5 strength,  5/5.  DF and PF 5/5.  Sensation- intact all dermatones to pinprick and light touch.  Coordination-ambulatory, no ataxia..  Overall symmetrical exam.  NIHSS zero.  HEME: no bruising or petechiae/contusions  LYMPH: no lymphadenopathy    Emergency Department Course     ECG:  Indication: dizziness  Time: 1553  Vent. Rate 89 bpm. SC interval 134. QRS duration 96. QT/QTc 354/430. P-R-T axis -6 4 69.  Normal sinus rhythm. Read time: 1553    Imaging:  Radiology findings were communicated with the patient who voiced understanding of the findings.    CT head w/o IV contrast:   No evidence for acute infarction, mass, or mass effect. Moderate chronic ischemic changes are identified supratentorial deep white matter. Please see above for description, as per radiology.    CT head neck w/ IV contrast:   1.  Mild narrowing cavernous ICA segments. No intracranial aneurysm, dissection or high-grade stenosis. Nothing for large vessel occlusion intracranially.  2.  Normal neck CTA.  3.  Additional nonurgent findings as above in the nonvascular structures section of the report, as per radiology.    Laboratory:  Laboratory findings were communicated with the patient who voiced understanding of the  findings.    CBC: WBC 6.9, HGB 13.4,    CMP: Glucose 123 (H), GFR 47 (L), Calcium 10.4 (H), ALT 56 (H) o/w WNL (Creatinine 1.13 (H))  1601 Troponin I <0.015    UA with microscopic: trace of leukocyte esterase, few bacteria, mucous present o/w WNL     Interventions:  1603 NS 1L IV  Heplock  Cardiac/Sp02 monitoring     Emergency Department Course:  Past medical records, nursing notes, and vitals reviewed.    1543: I performed an exam of the patient as documented above.     EKG obtained in the ED, see results above.   IV was inserted and blood was drawn for laboratory testing, results above.  The patient provided a urine sample here in the emergency department. This was sent for laboratory testing, findings above.  The patient was sent for a head CT and a head and neck CT while in the emergency department, results above.     1600: Patient rechecked and updated.    1759: recheck, updated patient    Discussed results with patient.  Gave patient copies of all results (applicable labs, CT scans and/or ultrasounds).  Answered questions.  Asked patient to followup with PCP.  Circled any abnormal lab values and asked patient to followup with PCP.    I personally reviewed the laboratory, imaging, urinary, and EKG results with the Patient and answered all related questions prior to discharge.    /81   Pulse 90   Temp 97.9  F (36.6  C)   Resp 16   SpO2 98%        Impression & Plan     Medical Decision Making:  Kelle Abdalla is a very pleasant 78 year old patient who had non-specific dizziness but no vertigo. The patient's neurological exam is otherwise normal and she was able to urinate for us, which did not show any sign of infection. She was given an IV fluid bolus and she actually stated that she felt better after that.  EKG with no ectopy and she was watched  On the cardiac monitor.    Her labs did not show any signs of ischemia. Her EKG was normal. Troponin was normal. Creatinine was a little bit  high at 1.13 and it hasn't been in the past. CBC is otherwise normal.  Patient felt better after IV fluids.     I do want her to get her kidney function rechecked with her PCP. She does have a history of pulmonary embolism and is currently taking her Eliquis. She did not have any tachycardia or chest pain to suggest any pulmonary embolism and she did not feel more winded than normal. She was given copies of all her studies and she is going to follow-up with her primary care. I did CT angio and there is no evidence of dissection or stenosis or mass on the head CT. She was given copies of all her studies to follow-up.      Discharge Diagnosis:    ICD-10-CM    1. Dizziness R42    2. Renal insufficiency N28.9        Disposition:  Discharged to home    Instructions to patient:  Your treatment plan includes:    1) calling your PCP for followup in the next 2-3 days.    2) continue to take your eliquis    3) come back if you get worse    Reasons to return: chest pain, shortness of breath, nausea/vomiting, bleeding, confusion, blood in stools, dizziness, passing out, increasing headache, weakness, inability to walk.  Also return if increasing cough, difficulty breathing, nausea/vomiting, confusion, increasing abdominal pain.     Scribe Disclosure:  I, Renetta Whatley, am serving as a scribe at 5:24 PM on 9/30/2019 to document services personally performed by Ignacia Villagomez MD based on my observations and the provider's statements to me.        Ignacia Villagomez MD  10/02/19 0604

## 2019-09-30 NOTE — NURSING NOTE
Patient walked into clinic today thinking it was an Urgent Care. She has been having problems with lightheadedness/nausea and has been seen in the past for this, but today it was worse and she had some left hand tingling about 2 hours ago, but is not feeling it now. States she was not leaning on it or cutting off circulation. She is currently feeling light headedness, but not nauseous - states the nausea went away when she had something to eat. Also has a headache. Patient additionally states she doesn't drink much water because she has an incontinence issue. Discussed importance of staying well hydrated and to f/u with     States she saw a provider at [facility] who reduced her hydrochlorothiazide from 25mg to 12.5mg. When attempted to review medications with this RN, patient did not know them off the top of her head, but thought the list was fairly complete, stated there were too many to keep track of.     At [facility] visit, it was noted the lightheadedness lasts about a 1/2 hour and comes and goes. Today, patient states it is more constant.   Patient is anticoagulated with Eliquis for acute pulmonary embolism with acute cor pulmonale. No bruising or bleeding noted.     Patient is diabetic, but does not check glucose levels at home. States she was never told to.    Huddled with POD: Agree patient should be evaluated in ED.

## 2019-10-01 ENCOUNTER — THERAPY VISIT (OUTPATIENT)
Dept: PHYSICAL THERAPY | Facility: CLINIC | Age: 79
End: 2019-10-01
Payer: COMMERCIAL

## 2019-10-01 DIAGNOSIS — K59.00 CONSTIPATION, UNSPECIFIED CONSTIPATION TYPE: ICD-10-CM

## 2019-10-01 DIAGNOSIS — N39.46 MIXED INCONTINENCE: Primary | ICD-10-CM

## 2019-10-01 LAB — INTERPRETATION ECG - MUSE: NORMAL

## 2019-10-01 PROCEDURE — 97112 NEUROMUSCULAR REEDUCATION: CPT | Mod: GP | Performed by: PHYSICAL THERAPIST

## 2019-10-01 PROCEDURE — 97535 SELF CARE MNGMENT TRAINING: CPT | Mod: GP | Performed by: PHYSICAL THERAPIST

## 2019-10-01 PROCEDURE — 97110 THERAPEUTIC EXERCISES: CPT | Mod: GP | Performed by: PHYSICAL THERAPIST

## 2019-10-01 NOTE — LETTER
"[facility]  [address]  [address]  [facility] [address]  [phone]    2019    Re: Kelle Abdalla   :   1940  MRN:  [id number]   REFERRING PHYSICIAN:   Zenia LAW [facility]    Date of Initial Evaluation:  19  Visits:  Rxs Used: 11  Reason for Referral:     Mixed incontinence  Constipation, unspecified constipation type    Assessment/Plan:    PROGRESS  REPORT    Progress reporting period is from 10/01/19.       SUBJECTIVE  Subjective changes noted by patient:  .  Subjective: \"Ups and downs\" with bladder/bowel. Had some diarrhea/urgency but improved with Immodium. Had another episode of feeling lightheaded so went to urgent care/ER and did head scan. Noticed immediately when saline/medication \"hit bladder\". So had to void frequently due to urgency. No leakage but urgent. Also at night went more often, 2-3x. Still not sure if drinking enough water.     Current pain level is NA  .     Previous pain level was  NA  .   Changes in function:  Yes (See Goal flowsheet attached for changes in current functional level)  Adverse reaction to treatment or activity: saline/dye from head scan    OBJECTIVE  Changes noted in objective findings:    Objective: Discussed drinking more water throughout day to put bladder on stretch for \"normal\" urge vs concentrated urine irritating bladder. Kegel strength same at 2+/5. Very difficult for patient to avoid valsalva with abdominal and/or pelvic floor contraction. Needs significant manual/verbal/visual cues to avoid valsalva with exhale/TA tightening.      ASSESSMENT/PLAN  Updated problem list and treatment plan: Diagnosis 1:  Mixed incontinence  Decreased ROM/flexibility - manual therapy and therapeutic exercise  Decreased strength - therapeutic exercise and therapeutic activities  Decreased proprioception - neuro re-education and therapeutic activities  Inflammation - self management/home program  Impaired gait - " gait training  Impaired muscle performance - neuro re-education  Re: Kelle Abdalla   :   1940    Decreased function - therapeutic activities  Impaired posture - neuro re-education  STG/LTGs have been met or progress has been made towards goals:  Yes (See Goal flow sheet completed today.)  Assessment of Progress: The patient's progress has plateaued.  Self Management Plans:  Patient has been instructed in a home treatment program.  Patient  has been instructed in self management of symptoms.    Kelle continues to require the following intervention to meet STG and LTG's:  PT    Recommendations:  This patient would benefit from continued therapy.     Frequency:  2 X a month, once daily  Duration:  for 2 months      Thank you for your referral.    INQUIRIES  Therapist: Karla Cline, MS,PT,OCS  [facility]  [address] [place] [address]  Phone: [phone]  Fax: [phone]

## 2019-10-01 NOTE — PROGRESS NOTES
"Subjective:  HPI                    Objective:  System    Physical Exam    General     ROS    Assessment/Plan:    PROGRESS  REPORT    Progress reporting period is from 10/01/19.       SUBJECTIVE  Subjective changes noted by patient:  .  Subjective: \"Ups and downs\" with bladder/bowel. Had some diarrhea/urgency but improved with Immodium. Had another episode of feeling lightheaded so went to urgent care/ER and did head scan. Noticed immediately when saline/medication \"hit bladder\". So had to void frequently due to urgency. No leakage but urgent. Also at night went more often, 2-3x. Still not sure if drinking enough water.     Current pain level is NA  .     Previous pain level was  NA  .   Changes in function:  Yes (See Goal flowsheet attached for changes in current functional level)  Adverse reaction to treatment or activity: saline/dye from head scan    OBJECTIVE  Changes noted in objective findings:    Objective: Discussed drinking more water throughout day to put bladder on stretch for \"normal\" urge vs concentrated urine irritating bladder. Kegel strength same at 2+/5. Very difficult for patient to avoid valsalva with abdominal and/or pelvic floor contraction. Needs significant manual/verbal/visual cues to avoid valsalva with exhale/TA tightening.      ASSESSMENT/PLAN  Updated problem list and treatment plan: Diagnosis 1:  Mixed incontinence  Decreased ROM/flexibility - manual therapy and therapeutic exercise  Decreased strength - therapeutic exercise and therapeutic activities  Decreased proprioception - neuro re-education and therapeutic activities  Inflammation - self management/home program  Impaired gait - gait training  Impaired muscle performance - neuro re-education  Decreased function - therapeutic activities  Impaired posture - neuro re-education  STG/LTGs have been met or progress has been made towards goals:  Yes (See Goal flow sheet completed today.)  Assessment of Progress: The patient's progress has " plateaued.  Self Management Plans:  Patient has been instructed in a home treatment program.  Patient  has been instructed in self management of symptoms.    Kelle continues to require the following intervention to meet STG and LTG's:  PT    Recommendations:  This patient would benefit from continued therapy.     Frequency:  2 X a month, once daily  Duration:  for 2 months        Please refer to the daily flowsheet for treatment today, total treatment time and time spent performing 1:1 timed codes.

## 2019-10-15 ENCOUNTER — THERAPY VISIT (OUTPATIENT)
Dept: PHYSICAL THERAPY | Facility: CLINIC | Age: 79
End: 2019-10-15
Payer: COMMERCIAL

## 2019-10-15 DIAGNOSIS — N39.46 MIXED INCONTINENCE: Primary | ICD-10-CM

## 2019-10-15 DIAGNOSIS — K59.00 CONSTIPATION, UNSPECIFIED CONSTIPATION TYPE: ICD-10-CM

## 2019-10-15 PROCEDURE — 97110 THERAPEUTIC EXERCISES: CPT | Mod: GP | Performed by: PHYSICAL THERAPIST

## 2019-10-15 PROCEDURE — 97535 SELF CARE MNGMENT TRAINING: CPT | Mod: GP | Performed by: PHYSICAL THERAPIST

## 2019-10-15 PROCEDURE — 97112 NEUROMUSCULAR REEDUCATION: CPT | Mod: GP | Performed by: PHYSICAL THERAPIST

## 2019-10-28 ENCOUNTER — THERAPY VISIT (OUTPATIENT)
Dept: PHYSICAL THERAPY | Facility: CLINIC | Age: 79
End: 2019-10-28
Payer: COMMERCIAL

## 2019-10-28 DIAGNOSIS — N39.46 MIXED INCONTINENCE: Primary | ICD-10-CM

## 2019-10-28 DIAGNOSIS — K59.00 CONSTIPATION, UNSPECIFIED CONSTIPATION TYPE: ICD-10-CM

## 2019-10-28 PROCEDURE — 97110 THERAPEUTIC EXERCISES: CPT | Mod: GP | Performed by: PHYSICAL THERAPIST

## 2019-10-28 PROCEDURE — 97535 SELF CARE MNGMENT TRAINING: CPT | Mod: GP | Performed by: PHYSICAL THERAPIST

## 2019-10-28 NOTE — PROGRESS NOTES
"Subjective:  HPI                    Objective:  System    Physical Exam    General     ROS    Assessment/Plan:    DISCHARGE REPORT    Progress reporting period is from 10/28/19.       SUBJECTIVE  Subjective changes noted by patient:  .  Subjective: 15 min late. \"Still having problems\". Biggest issue is trying to stay calm when urge to void is present. Leakage amount varies. Still sometimes feels incomplete emptying with BM's.Takes awhile to empty sometimes. However, also will have weeks where can empty fully, BM 1x day. Able to delay voiding usually 1-2 hours and has had a few successes of making to bathroom in time.     Current pain level is NA  .     Previous pain level was  NA  .   Changes in function:  Yes (See Goal flowsheet attached for changes in current functional level)  Adverse reaction to treatment or activity: None    OBJECTIVE  Changes noted in objective findings:    Objective: Discussed importance of proper toileting position and use of squatty potty or step stool. Pelvic floor muscles WNL for tension, no palpable tenderness. Kegel strength still very weak 1+ to 2/5. Difficulty isolating contraction and needs reminders to avoid valsalva. Patient to discuss with MD possible use of urgency medication and continue with HEP for ~ 8 weeks on her own. If no change after new year, consider pelvic floor stimulator for home use.      ASSESSMENT/PLAN  Updated problem list and treatment plan:   STG/LTGs have been met or progress has been made towards goals:  Yes (See Goal flow sheet completed today.)  Assessment of Progress: The patient's progress has plateaued.  Self Management Plans:  Patient is independent in a home treatment program.  Patient is independent in self management of symptoms.    Kelle continues to require the following intervention to meet STG and LTG's:  PT intervention is no longer required to meet STG/LTG.    Recommendations:  This patient is ready to be discharged from therapy and " continue their home treatment program.  Patient would benefit from the following:  If no change  In 8 weeks of pelvic floor strengthening, consider home pelvic floor stimulator.             Please refer to the daily flowsheet for treatment today, total treatment time and time spent performing 1:1 timed codes.

## 2019-10-28 NOTE — LETTER
"THANH MCKEON BANDAR PT  [address] [address]  [address]  [facility] [place] [facility] [address]  [phone]    2019    Re: Kelle Abdalla   :   1940  MRN:  [id number]   REFERRING PHYSICIAN:   Zenia MCKEON BANDAR PT    Date of Initial Evaluation:  2019  Visits:  Rxs Used: 13  Reason for Referral:     Mixed incontinence  Constipation, unspecified constipation type     DISCHARGE REPORT    Progress reporting period is from 10/28/19.       SUBJECTIVE  Subjective changes noted by patient:  .  Subjective: 15 min late. \"Still having problems\". Biggest issue is trying to stay calm when urge to void is present. Leakage amount varies. Still sometimes feels incomplete emptying with BM's.Takes awhile to empty sometimes. However, also will have weeks where can empty fully, BM 1x day. Able to delay voiding usually 1-2 hours and has had a few successes of making to bathroom in time.     Current pain level is NA  .     Previous pain level was  NA  .   Changes in function:  Yes (See Goal flowsheet attached for changes in current functional level)  Adverse reaction to treatment or activity: None    OBJECTIVE  Changes noted in objective findings:    Objective: Discussed importance of proper toileting position and use of squatty potty or step stool. Pelvic floor muscles WNL for tension, no palpable tenderness. Kegel strength still very weak 1+ to 2/5. Difficulty isolating contraction and needs reminders to avoid valsalva. Patient to discuss with MD possible use of urgency medication and continue with HEP for ~ 8 weeks on her own. If no change after new year, consider pelvic floor stimulator for home use.      ASSESSMENT/PLAN  Updated problem list and treatment plan:   STG/LTGs have been met or progress has been made towards goals:  Yes (See Goal flow sheet completed today.)  Assessment of Progress: The patient's progress has plateaued.  Self Management Plans:  Patient is independent in a home " treatment program.  Patient is independent in self management of symptoms.  Re: Kelle Abdalla   :   1940    Kelle continues to require the following intervention to meet STG and LTG's:  PT intervention is no longer required to meet STG/LTG.    Recommendations:  This patient is ready to be discharged from therapy and continue their home treatment program.  Patient would benefit from the following:  If no change In 8 weeks of pelvic floor strengthening, consider home pelvic floor stimulator.     Thank you for your referral.    INQUIRIES  Therapist: THANH Pleitez [facility] PT, MS, PT, OCS  [address] [address] [address]  Phone: [phone]  Fax: [phone]

## 2023-12-11 NOTE — PROGRESS NOTES
[facility]  Hospitalist Progress Note  Tonya Daley MD 07/24/2019    Reason for Stay (Diagnosis): Submassive PE         Assessment and Plan:      Summary of Stay: Kelle Abdalla is a 78 year old female with a history of HTN, HLD, type 2 diabetes, former smoker, hypercalcemia, and osteoarthritis who underwent partial left knee replacement on June 5, 2019 on aspirin 81 mg twice daily for DVT prophylaxis who presented with 2 days of progressive exertional dyspnea and generalized fatigue.  Evaluation is consistent with submassive PE with RV strain and elevated troponin.  She has been admitted to inpatient status on 7/23/2019 for anticoagulation and monitoring purposes.    Problem List:   1. Submassive PE: Currently on IV heparin.  Has evidence of RV strain on CT scan as well as an elevated troponin.  BNP is surprisingly negative.  Ultrasound of the heart shows mild RV dilation moderate pulmonary hypertension.  Continue IV heparin overnight, await pharmacy liaison input to determine appropriate transition to oral agents.  This seems like it was a provoked clot in the setting of recent partial knee replacement surgery.  Given her age I did ask about age and sex related cancer screening and she states that she had her colonoscopy last year and she is does get a mammogram yearly and that is time is coming up soon.  I encouraged her to keep that up-to-date  2. Elevated troponin, sinus tachycardia, elevated lactic acid, mild hypoxia: All secondary to #1  3. Hypertension: PTA regimen: Losartan 50 mg p.o. daily, hydrochlorothiazide 12.5 mg p.o. daily, spironolactone 25 mg p.o. daily, and amlodipine 5 mg p.o. Daily: Currently only on amlodipine 5 mg p.o. daily in the setting of RV strain.  PCP stable, will likely be able to add an additional medications tomorrow  4. HLP: Continue pravastatin as at home  5. Type 2 diabetes: On metformin  mg daily and blood sugars under good control-no need to track with  "ISS  6. Cirrhotic appearing liver on CT scan: Incidental finding, check LFTs in the morning-and recommend follow-up with primary MD in the outpatient setting  DVT Prophylaxis: Currently on therapeutic heparin drip  Code Status: Full Code  Functional Status: Independent  Diet: Regular  Melo: Not in place    Disposition Plan   Expected discharge in 1-2 days to prior living arrangement once monitored adequately given finding of submassive PE with multiple secondary complications..     Entered: Tonya Daley 07/24/2019, 2:59 PM               Interval History (Subjective):      Fatigued and sleepy today is did not sleep well overnight.  Not really having any chest pain or shortness of breath.  She does get a cough if she takes a deep breath but that is even getting better.  No nausea or vomiting, p.o. intake is fair                  Physical Exam:      Last Vital Signs:  /51 (BP Location: Left arm)   Pulse 112   Temp 97.3  F (36.3  C) (Oral)   Resp 20   Ht 1.676 m (5' 6\")   Wt 83.7 kg (184 lb 8 oz)   SpO2 93%   BMI 29.78 kg/m      I/O: Not being tracked  Telemetry:: Sinus tach although not improving    Pleasant no acute distress looks stated age head is normocephalic atraumatic and sclera are clear lungs are clear to auscultation she exhibits normal respiratory effort heart is of regular rate mildly tachycardic and rhythm without murmurs rubs or gallops no lower extremity edema, abdomen is soft nontender nondistended skin is warm and dry there is no cyanosis or clubbing of the extremities, her affect is appropriate she is alert and oriented           Medications:      All current medications were reviewed with changes reflected in problem list.         Data:      All new lab and imaging data was reviewed.   Labs:  Recent Labs   Lab 07/24/19  0717      POTASSIUM 4.1   CHLORIDE 108   CO2 23   ANIONGAP 9   *   BUN 21   CR 0.75   GFRESTIMATED 77   GFRESTBLACK 89   ROSE 10.1     Recent Labs   Lab " No 07/23/19  1748   NTBNPI 546     Recent Labs   Lab 07/24/19  0717   WBC 7.0   HGB 13.2   HCT 39.7   MCV 92        Recent Labs   Lab 07/24/19  0717 07/23/19  1748   * 128*     Recent Labs   Lab 07/24/19  0717 07/23/19  2226 07/23/19  1748   TROPI 0.258* 0.408* 0.386*      Imaging:     CT chest PE protocol 7/23/2019  IMPRESSION:  1. Large bilateral pulmonary emboli with suggestion of right heart  strain.  2. Cirrhotic appearance of the liver.    Bilateral lower extremity ultrasound 7/23/2019  FINDINGS: No evidence of lower extremity deep venous thrombosis in the  right lower extremity. There is occlusive thrombus involving the left  mid and proximal calf peroneal veins, with extension into the  popliteal vein    Echo 7/24/2019  Interpretation Summary     Left ventricular systolic function is normal.The visual ejection fraction is  estimated at 65%.  The right ventricle is mildly dilated.The right ventricular systolic function  is borderline reduced.  There is moderate (2+) tricuspid regurgitation.  Pulmonary hypertension- RVSP 49 mm hg +RA.

## 2024-03-04 ENCOUNTER — TRANSFERRED RECORDS (OUTPATIENT)
Dept: HEALTH INFORMATION MANAGEMENT | Facility: CLINIC | Age: 84
End: 2024-03-04

## 2024-03-04 ENCOUNTER — HOSPITAL ENCOUNTER (INPATIENT)
Facility: CLINIC | Age: 84
LOS: 3 days | Discharge: HOME HEALTH | DRG: 175 | End: 2024-03-07
Attending: STUDENT IN AN ORGANIZED HEALTH CARE EDUCATION/TRAINING PROGRAM | Admitting: STUDENT IN AN ORGANIZED HEALTH CARE EDUCATION/TRAINING PROGRAM
Payer: COMMERCIAL

## 2024-03-04 ENCOUNTER — APPOINTMENT (OUTPATIENT)
Dept: CT IMAGING | Facility: CLINIC | Age: 84
DRG: 175 | End: 2024-03-04
Attending: STUDENT IN AN ORGANIZED HEALTH CARE EDUCATION/TRAINING PROGRAM
Payer: COMMERCIAL

## 2024-03-04 ENCOUNTER — APPOINTMENT (OUTPATIENT)
Dept: ULTRASOUND IMAGING | Facility: CLINIC | Age: 84
DRG: 175 | End: 2024-03-04
Attending: STUDENT IN AN ORGANIZED HEALTH CARE EDUCATION/TRAINING PROGRAM
Payer: COMMERCIAL

## 2024-03-04 DIAGNOSIS — I26.99 BILATERAL PULMONARY EMBOLISM (H): ICD-10-CM

## 2024-03-04 DIAGNOSIS — R06.89 ACUTE RESPIRATORY INSUFFICIENCY: ICD-10-CM

## 2024-03-04 DIAGNOSIS — I26.99 OTHER ACUTE PULMONARY EMBOLISM, UNSPECIFIED WHETHER ACUTE COR PULMONALE PRESENT (H): ICD-10-CM

## 2024-03-04 DIAGNOSIS — I26.09 OTHER ACUTE PULMONARY EMBOLISM WITH ACUTE COR PULMONALE (H): Primary | ICD-10-CM

## 2024-03-04 LAB
ANION GAP SERPL CALCULATED.3IONS-SCNC: 13 MMOL/L (ref 7–15)
ATRIAL RATE - MUSE: 115 BPM
BASOPHILS # BLD AUTO: 0 10E3/UL (ref 0–0.2)
BASOPHILS NFR BLD AUTO: 0 %
BUN SERPL-MCNC: 19.6 MG/DL (ref 8–23)
CALCIUM SERPL-MCNC: 11.2 MG/DL (ref 8.8–10.2)
CHLORIDE SERPL-SCNC: 107 MMOL/L (ref 98–107)
CREAT SERPL-MCNC: 0.88 MG/DL (ref 0.51–0.95)
DEPRECATED HCO3 PLAS-SCNC: 24 MMOL/L (ref 22–29)
DIASTOLIC BLOOD PRESSURE - MUSE: NORMAL MMHG
EGFRCR SERPLBLD CKD-EPI 2021: 66 ML/MIN/1.73M2
EOSINOPHIL # BLD AUTO: 0.1 10E3/UL (ref 0–0.7)
EOSINOPHIL NFR BLD AUTO: 2 %
ERYTHROCYTE [DISTWIDTH] IN BLOOD BY AUTOMATED COUNT: 13 % (ref 10–15)
ERYTHROCYTE [DISTWIDTH] IN BLOOD BY AUTOMATED COUNT: 13.1 % (ref 10–15)
FLUAV RNA SPEC QL NAA+PROBE: NEGATIVE
FLUBV RNA RESP QL NAA+PROBE: NEGATIVE
GLUCOSE SERPL-MCNC: 127 MG/DL (ref 70–99)
HBA1C MFR BLD: 6.8 %
HCO3 BLDV-SCNC: 26 MMOL/L (ref 21–28)
HCT VFR BLD AUTO: 40.9 % (ref 35–47)
HCT VFR BLD AUTO: 41.8 % (ref 35–47)
HGB BLD-MCNC: 13.9 G/DL (ref 11.7–15.7)
HGB BLD-MCNC: 14.2 G/DL (ref 11.7–15.7)
HOLD SPECIMEN: NORMAL
HOLD SPECIMEN: NORMAL
IMM GRANULOCYTES # BLD: 0 10E3/UL
IMM GRANULOCYTES NFR BLD: 1 %
INTERPRETATION ECG - MUSE: NORMAL
LACTATE BLD-SCNC: 1.4 MMOL/L
LYMPHOCYTES # BLD AUTO: 2.1 10E3/UL (ref 0.8–5.3)
LYMPHOCYTES NFR BLD AUTO: 29 %
MCH RBC QN AUTO: 30 PG (ref 26.5–33)
MCH RBC QN AUTO: 30.1 PG (ref 26.5–33)
MCHC RBC AUTO-ENTMCNC: 34 G/DL (ref 31.5–36.5)
MCHC RBC AUTO-ENTMCNC: 34 G/DL (ref 31.5–36.5)
MCV RBC AUTO: 88 FL (ref 78–100)
MCV RBC AUTO: 89 FL (ref 78–100)
MONOCYTES # BLD AUTO: 0.7 10E3/UL (ref 0–1.3)
MONOCYTES NFR BLD AUTO: 10 %
NEUTROPHILS # BLD AUTO: 4.2 10E3/UL (ref 1.6–8.3)
NEUTROPHILS NFR BLD AUTO: 58 %
NRBC # BLD AUTO: 0 10E3/UL
NRBC BLD AUTO-RTO: 0 /100
NT-PROBNP SERPL-MCNC: 60 PG/ML (ref 0–1800)
P AXIS - MUSE: 74 DEGREES
PCO2 BLDV: 41 MM HG (ref 40–50)
PH BLDV: 7.4 [PH] (ref 7.32–7.43)
PLATELET # BLD AUTO: 174 10E3/UL (ref 150–450)
PLATELET # BLD AUTO: 174 10E3/UL (ref 150–450)
PO2 BLDV: 24 MM HG (ref 25–47)
POTASSIUM SERPL-SCNC: 4.5 MMOL/L (ref 3.4–5.3)
PR INTERVAL - MUSE: 168 MS
QRS DURATION - MUSE: 80 MS
QT - MUSE: 316 MS
QTC - MUSE: 437 MS
R AXIS - MUSE: -11 DEGREES
RADIOLOGIST FLAGS: ABNORMAL
RBC # BLD AUTO: 4.62 10E6/UL (ref 3.8–5.2)
RBC # BLD AUTO: 4.74 10E6/UL (ref 3.8–5.2)
RSV RNA SPEC NAA+PROBE: NEGATIVE
SAO2 % BLDV: 43 % (ref 70–75)
SARS-COV-2 RNA RESP QL NAA+PROBE: NEGATIVE
SODIUM SERPL-SCNC: 144 MMOL/L (ref 135–145)
SYSTOLIC BLOOD PRESSURE - MUSE: NORMAL MMHG
T AXIS - MUSE: 54 DEGREES
TROPONIN T SERPL HS-MCNC: 26 NG/L
UFH PPP CHRO-ACNC: >1.1 IU/ML
VENTRICULAR RATE- MUSE: 115 BPM
WBC # BLD AUTO: 7.2 10E3/UL (ref 4–11)
WBC # BLD AUTO: 8.3 10E3/UL (ref 4–11)

## 2024-03-04 PROCEDURE — 71275 CT ANGIOGRAPHY CHEST: CPT

## 2024-03-04 PROCEDURE — 87040 BLOOD CULTURE FOR BACTERIA: CPT | Performed by: STUDENT IN AN ORGANIZED HEALTH CARE EDUCATION/TRAINING PROGRAM

## 2024-03-04 PROCEDURE — 83880 ASSAY OF NATRIURETIC PEPTIDE: CPT | Performed by: STUDENT IN AN ORGANIZED HEALTH CARE EDUCATION/TRAINING PROGRAM

## 2024-03-04 PROCEDURE — 85048 AUTOMATED LEUKOCYTE COUNT: CPT | Performed by: STUDENT IN AN ORGANIZED HEALTH CARE EDUCATION/TRAINING PROGRAM

## 2024-03-04 PROCEDURE — 87637 SARSCOV2&INF A&B&RSV AMP PRB: CPT | Performed by: STUDENT IN AN ORGANIZED HEALTH CARE EDUCATION/TRAINING PROGRAM

## 2024-03-04 PROCEDURE — 250N000011 HC RX IP 250 OP 636: Performed by: STUDENT IN AN ORGANIZED HEALTH CARE EDUCATION/TRAINING PROGRAM

## 2024-03-04 PROCEDURE — 250N000009 HC RX 250: Performed by: STUDENT IN AN ORGANIZED HEALTH CARE EDUCATION/TRAINING PROGRAM

## 2024-03-04 PROCEDURE — 80048 BASIC METABOLIC PNL TOTAL CA: CPT | Performed by: STUDENT IN AN ORGANIZED HEALTH CARE EDUCATION/TRAINING PROGRAM

## 2024-03-04 PROCEDURE — 85027 COMPLETE CBC AUTOMATED: CPT | Performed by: STUDENT IN AN ORGANIZED HEALTH CARE EDUCATION/TRAINING PROGRAM

## 2024-03-04 PROCEDURE — 84484 ASSAY OF TROPONIN QUANT: CPT | Performed by: STUDENT IN AN ORGANIZED HEALTH CARE EDUCATION/TRAINING PROGRAM

## 2024-03-04 PROCEDURE — 250N000013 HC RX MED GY IP 250 OP 250 PS 637: Performed by: STUDENT IN AN ORGANIZED HEALTH CARE EDUCATION/TRAINING PROGRAM

## 2024-03-04 PROCEDURE — 83036 HEMOGLOBIN GLYCOSYLATED A1C: CPT | Performed by: STUDENT IN AN ORGANIZED HEALTH CARE EDUCATION/TRAINING PROGRAM

## 2024-03-04 PROCEDURE — 82803 BLOOD GASES ANY COMBINATION: CPT

## 2024-03-04 PROCEDURE — 120N000001 HC R&B MED SURG/OB

## 2024-03-04 PROCEDURE — 99285 EMERGENCY DEPT VISIT HI MDM: CPT | Mod: 25

## 2024-03-04 PROCEDURE — 85520 HEPARIN ASSAY: CPT | Performed by: STUDENT IN AN ORGANIZED HEALTH CARE EDUCATION/TRAINING PROGRAM

## 2024-03-04 PROCEDURE — 99223 1ST HOSP IP/OBS HIGH 75: CPT | Performed by: STUDENT IN AN ORGANIZED HEALTH CARE EDUCATION/TRAINING PROGRAM

## 2024-03-04 PROCEDURE — 93005 ELECTROCARDIOGRAM TRACING: CPT

## 2024-03-04 PROCEDURE — 36415 COLL VENOUS BLD VENIPUNCTURE: CPT | Performed by: STUDENT IN AN ORGANIZED HEALTH CARE EDUCATION/TRAINING PROGRAM

## 2024-03-04 PROCEDURE — 93970 EXTREMITY STUDY: CPT

## 2024-03-04 PROCEDURE — 99418 PROLNG IP/OBS E/M EA 15 MIN: CPT | Performed by: STUDENT IN AN ORGANIZED HEALTH CARE EDUCATION/TRAINING PROGRAM

## 2024-03-04 RX ORDER — MULTIPLE VITAMINS W/ MINERALS TAB 9MG-400MCG
1 TAB ORAL DAILY
Status: DISCONTINUED | OUTPATIENT
Start: 2024-03-05 | End: 2024-03-07 | Stop reason: HOSPADM

## 2024-03-04 RX ORDER — AMOXICILLIN 250 MG
1 CAPSULE ORAL 2 TIMES DAILY PRN
Status: DISCONTINUED | OUTPATIENT
Start: 2024-03-04 | End: 2024-03-07 | Stop reason: HOSPADM

## 2024-03-04 RX ORDER — PANTOPRAZOLE SODIUM 40 MG/1
40 TABLET, DELAYED RELEASE ORAL DAILY
Status: DISCONTINUED | OUTPATIENT
Start: 2024-03-05 | End: 2024-03-07 | Stop reason: HOSPADM

## 2024-03-04 RX ORDER — LOSARTAN POTASSIUM 100 MG/1
100 TABLET ORAL DAILY
COMMUNITY

## 2024-03-04 RX ORDER — AMLODIPINE BESYLATE 5 MG/1
5 TABLET ORAL DAILY
Status: DISCONTINUED | OUTPATIENT
Start: 2024-03-05 | End: 2024-03-07 | Stop reason: HOSPADM

## 2024-03-04 RX ORDER — SPIRONOLACTONE 25 MG/1
25 TABLET ORAL DAILY
COMMUNITY

## 2024-03-04 RX ORDER — AMOXICILLIN 250 MG
2 CAPSULE ORAL 2 TIMES DAILY PRN
Status: DISCONTINUED | OUTPATIENT
Start: 2024-03-04 | End: 2024-03-07 | Stop reason: HOSPADM

## 2024-03-04 RX ORDER — ACETAMINOPHEN 650 MG/1
650 SUPPOSITORY RECTAL EVERY 4 HOURS PRN
Status: DISCONTINUED | OUTPATIENT
Start: 2024-03-04 | End: 2024-03-07 | Stop reason: HOSPADM

## 2024-03-04 RX ORDER — CICLOPIROX OLAMINE 7.7 MG/G
CREAM TOPICAL AT BEDTIME
COMMUNITY

## 2024-03-04 RX ORDER — METHYLCELLULOSE 2 G/19G
1 POWDER, FOR SOLUTION ORAL
COMMUNITY

## 2024-03-04 RX ORDER — ALENDRONATE SODIUM 70 MG/1
70 TABLET ORAL
COMMUNITY

## 2024-03-04 RX ORDER — SPIRONOLACTONE 25 MG/1
25 TABLET ORAL DAILY
Status: DISCONTINUED | OUTPATIENT
Start: 2024-03-05 | End: 2024-03-07 | Stop reason: HOSPADM

## 2024-03-04 RX ORDER — LOSARTAN POTASSIUM 100 MG/1
100 TABLET ORAL DAILY
Status: DISCONTINUED | OUTPATIENT
Start: 2024-03-05 | End: 2024-03-07 | Stop reason: HOSPADM

## 2024-03-04 RX ORDER — IOPAMIDOL 755 MG/ML
69 INJECTION, SOLUTION INTRAVASCULAR ONCE
Status: COMPLETED | OUTPATIENT
Start: 2024-03-04 | End: 2024-03-04

## 2024-03-04 RX ORDER — PRAVASTATIN SODIUM 40 MG
40 TABLET ORAL EVERY EVENING
Status: DISCONTINUED | OUTPATIENT
Start: 2024-03-04 | End: 2024-03-07 | Stop reason: HOSPADM

## 2024-03-04 RX ORDER — HEPARIN SODIUM 10000 [USP'U]/100ML
0-5000 INJECTION, SOLUTION INTRAVENOUS CONTINUOUS
Status: DISCONTINUED | OUTPATIENT
Start: 2024-03-04 | End: 2024-03-04

## 2024-03-04 RX ORDER — NICOTINE POLACRILEX 4 MG
15-30 LOZENGE BUCCAL
Status: DISCONTINUED | OUTPATIENT
Start: 2024-03-04 | End: 2024-03-07 | Stop reason: HOSPADM

## 2024-03-04 RX ORDER — DEXTROSE MONOHYDRATE 25 G/50ML
25-50 INJECTION, SOLUTION INTRAVENOUS
Status: DISCONTINUED | OUTPATIENT
Start: 2024-03-04 | End: 2024-03-07 | Stop reason: HOSPADM

## 2024-03-04 RX ORDER — HEPARIN SODIUM 10000 [USP'U]/100ML
0-5000 INJECTION, SOLUTION INTRAVENOUS CONTINUOUS
Status: DISCONTINUED | OUTPATIENT
Start: 2024-03-04 | End: 2024-03-06

## 2024-03-04 RX ORDER — FESOTERODINE FUMARATE 4 MG/1
4 TABLET, FILM COATED, EXTENDED RELEASE ORAL DAILY
COMMUNITY

## 2024-03-04 RX ORDER — ASPIRIN 81 MG/1
324 TABLET, CHEWABLE ORAL DAILY
Status: ON HOLD | COMMUNITY
End: 2024-03-07

## 2024-03-04 RX ORDER — ACETAMINOPHEN 325 MG/1
650 TABLET ORAL EVERY 4 HOURS PRN
Status: DISCONTINUED | OUTPATIENT
Start: 2024-03-04 | End: 2024-03-07 | Stop reason: HOSPADM

## 2024-03-04 RX ORDER — MULTIVIT WITH MINERALS/LUTEIN
1 TABLET ORAL DAILY
COMMUNITY

## 2024-03-04 RX ORDER — GLIPIZIDE 5 MG/1
5 TABLET, FILM COATED, EXTENDED RELEASE ORAL DAILY
COMMUNITY

## 2024-03-04 RX ORDER — OXYCODONE HYDROCHLORIDE 5 MG/1
5 TABLET ORAL EVERY 4 HOURS PRN
Status: DISCONTINUED | OUTPATIENT
Start: 2024-03-04 | End: 2024-03-07 | Stop reason: HOSPADM

## 2024-03-04 RX ORDER — CALCIUM CARBONATE 500 MG/1
1000 TABLET, CHEWABLE ORAL 4 TIMES DAILY PRN
Status: DISCONTINUED | OUTPATIENT
Start: 2024-03-04 | End: 2024-03-07 | Stop reason: HOSPADM

## 2024-03-04 RX ORDER — LIDOCAINE 40 MG/G
CREAM TOPICAL
Status: DISCONTINUED | OUTPATIENT
Start: 2024-03-04 | End: 2024-03-07 | Stop reason: HOSPADM

## 2024-03-04 RX ORDER — VITAMIN B COMPLEX
1000 TABLET ORAL DAILY
Status: DISCONTINUED | OUTPATIENT
Start: 2024-03-05 | End: 2024-03-07 | Stop reason: HOSPADM

## 2024-03-04 RX ORDER — AMOXICILLIN 500 MG/1
2000 CAPSULE ORAL
COMMUNITY

## 2024-03-04 RX ADMIN — IOPAMIDOL 69 ML: 755 INJECTION, SOLUTION INTRAVENOUS at 15:45

## 2024-03-04 RX ADMIN — HEPARIN SODIUM 1500 UNITS/HR: 10000 INJECTION, SOLUTION INTRAVENOUS at 17:03

## 2024-03-04 RX ADMIN — PRAVASTATIN SODIUM 40 MG: 40 TABLET ORAL at 20:20

## 2024-03-04 RX ADMIN — SODIUM CHLORIDE 93 ML: 9 INJECTION, SOLUTION INTRAVENOUS at 15:45

## 2024-03-04 RX ADMIN — DOCUSATE SODIUM 50 MG AND SENNOSIDES 8.6 MG 1 TABLET: 8.6; 5 TABLET, FILM COATED ORAL at 22:53

## 2024-03-04 ASSESSMENT — ACTIVITIES OF DAILY LIVING (ADL)
ADLS_ACUITY_SCORE: 36
ADLS_ACUITY_SCORE: 29
ADLS_ACUITY_SCORE: 29
ADLS_ACUITY_SCORE: 38
ADLS_ACUITY_SCORE: 36
ADLS_ACUITY_SCORE: 36
ADLS_ACUITY_SCORE: 29
ADLS_ACUITY_SCORE: 36

## 2024-03-04 ASSESSMENT — COLUMBIA-SUICIDE SEVERITY RATING SCALE - C-SSRS
2. HAVE YOU ACTUALLY HAD ANY THOUGHTS OF KILLING YOURSELF IN THE PAST MONTH?: NO
1. IN THE PAST MONTH, HAVE YOU WISHED YOU WERE DEAD OR WISHED YOU COULD GO TO SLEEP AND NOT WAKE UP?: NO
6. HAVE YOU EVER DONE ANYTHING, STARTED TO DO ANYTHING, OR PREPARED TO DO ANYTHING TO END YOUR LIFE?: NO

## 2024-03-04 NOTE — ED NOTES
[facility] [place]  ED Nurse Handoff Report    ED Chief complaint: Shortness of Breath and Tachycardia      ED Diagnosis:   Final diagnoses:   Bilateral pulmonary embolism (H)   Acute respiratory insufficiency       Code Status: not addressed at this time    Allergies:   Allergies   Allergen Reactions    Atorvastatin GI Disturbance    Ibuprofen Unknown    Pollen Extracts [Pollen Extract]        Patient Story: Pt BIBA from [facility]. Pt went to exercise class and was having difficulty talking to people d/t SOB. Pt did the class and was still having SOB after class. Pt went to  and was found to have GALLEGO and tachycardia 12 lead shows ST. Pt denies CP. Pt denies swelling in legs. Pt received 324 asa at . Pt alert and oriented. Pt not on blood thinners.     Focused Assessment:  Pt has shortness of breath with exertion, denies pain at this time. A&Ox4.    Treatments and/or interventions provided: Heparin gtt started at 1703 for pulmonary emboli    Patient's response to treatments and/or interventions: unchanged    CT Chest Pulmonary Embolism w Contrast   Final Result   Abnormal   IMPRESSION:   1.  Positive for moderate to large burden of bilateral pulmonary   emboli as described above. No CT evidence of right heart strain.      [Critical Result: Pulmonary embolism]      Finding was identified on 3/4/2024 4:01 PM.       Dr. Nascimento was contacted by me on 3/4/2024 4:08 PM and verbalized   understanding of the critical result.       CARRIE VAZQUEZ MD            SYSTEM ID:  [id number]        Labs Ordered and Resulted from Time of ED Arrival to Time of ED Departure   BASIC METABOLIC PANEL - Abnormal       Result Value    Sodium 144      Potassium 4.5      Chloride 107      Carbon Dioxide (CO2) 24      Anion Gap 13      Urea Nitrogen 19.6      Creatinine 0.88      GFR Estimate 66      Calcium 11.2 (*)     Glucose 127 (*)    TROPONIN T, HIGH SENSITIVITY - Abnormal    Troponin T, High Sensitivity 26 (*)    ISTAT  "GASES LACTATE VENOUS POCT - Abnormal    Lactic Acid POCT 1.4      Bicarbonate Venous POCT 26      O2 Sat, Venous POCT 43 (*)     pCO2 Venous POCT 41      pH Venous POCT 7.40      pO2 Venous POCT 24 (*)    NT PROBNP INPATIENT - Normal    N terminal Pro BNP Inpatient 60     CBC WITH PLATELETS AND DIFFERENTIAL    WBC Count 7.2      RBC Count 4.74      Hemoglobin 14.2      Hematocrit 41.8      MCV 88      MCH 30.0      MCHC 34.0      RDW 13.0      Platelet Count 174      % Neutrophils 58      % Lymphocytes 29      % Monocytes 10      % Eosinophils 2      % Basophils 0      % Immature Granulocytes 1      NRBCs per 100 WBC 0      Absolute Neutrophils 4.2      Absolute Lymphocytes 2.1      Absolute Monocytes 0.7      Absolute Eosinophils 0.1      Absolute Basophils 0.0      Absolute Immature Granulocytes 0.0      Absolute NRBCs 0.0     CBC WITH PLATELETS   BLOOD CULTURE   BLOOD CULTURE         To be done/followed up on inpatient unit:  follow admitting MD orders    Does this patient have any cognitive concerns?:  none    Activity level - Baseline/Home:  Independent  Activity Level - Current:   Independent    Patient's Preferred language: English   Needed?: No    Isolation: None  Infection: Not Applicable  Patient tested for COVID 19 prior to admission: NO  Bariatric?: No    Vital Signs:   Vitals:    03/04/24 1500 03/04/24 1600 03/04/24 1700 03/04/24 1706   BP: 117/85 (!) 157/88 (!) 172/106 (!) 184/113   Pulse: 112  (!) 133 (!) 125   Resp: 16  28 26   Temp:       TempSrc:       SpO2: 95%      Weight: 83.9 kg (185 lb)      Height: 1.651 m (5' 5\")          Cardiac Rhythm:Cardiac Rhythm: Sinus tachycardia    Was the PSS-3 completed:   Yes  What interventions are required if any?               Family Comments: no family at bedside, pt lives alone    For the majority of the shift this patient's behavior was Green.     ED NURSE PHONE NUMBER: [phone]         "

## 2024-03-04 NOTE — PHARMACY-ADMISSION MEDICATION HISTORY
Pharmacist Admission Medication History    Admission medication history is complete. The information provided in this note is only as accurate as the sources available at the time of the update.    Information Source(s): Patient, Clinic records, Hospital records, and [facility] via in-person Medlist from [facility]    Pertinent Information: None    Changes made to PTA medication list:  Added: all  Deleted: metformin  Changed: losartan/hydrochlorothiazide 100/25 mg daily--> losartan 100 mg daily    Allergies reviewed with patient and updates made in EHR: no assessed prior to this interview. Similar to medlist patient brought in    Medication History Completed By: Elizabeth Awan [facility] 3/4/2024 5:31 PM    Prior to Admission medications    Medication Sig Last Dose Taking? Auth Provider Long Term End Date   alendronate (FOSAMAX) 70 MG tablet Take 70 mg by mouth every 7 days On sundays 3/3/2024 Yes Unknown, Entered By History Yes    AmLODIPine Besylate (NORVASC PO) Take 5 mg by mouth daily 3/4/2024 at am Yes Reported, Patient Yes    amoxicillin (AMOXIL) 500 MG capsule Take 2,000 mg by mouth 1 hour Prior to dental appointment PRN Yes Unknown, Entered By History     aspirin (ASA) 81 MG chewable tablet Take 324 mg by mouth daily 3/4/2024 Yes Unknown, Entered By History No    ciclopirox (LOPROX) 0.77 % cream Apply topically at bedtime 3/3/2024 Yes Unknown, Entered By History     fesoterodine fumarate (TOVIAZ) 4 MG TB24 24 hr tablet Take 4 mg by mouth daily 3/4/2024 at am Yes Unknown, Entered By History     fish oil-omega-3 fatty acids 500 MG capsule Take 500 mg by mouth 2 times daily 3/4/2024 at am Yes Reported, Patient     glipiZIDE (GLUCOTROL XL) 5 MG 24 hr tablet Take 5 mg by mouth daily 3/4/2024 at am Yes Unknown, Entered By History Yes    losartan (COZAAR) 100 MG tablet Take 100 mg by mouth daily 3/4/2024 at am Yes Unknown, Entered By History     methylcellulose (CITRUCEL) powder Take 1 teaspoonful  by mouth 2 times daily 3/4/2024 at am Yes Unknown, Entered By History     multivitamin (CENTRUM SILVER) tablet Take 1 tablet by mouth daily 3/4/2024 at am Yes Unknown, Entered By History     omeprazole (PRILOSEC) 20 MG DR capsule Take 20 mg by mouth daily 3/4/2024 at am Yes Unknown, Entered By History     pravastatin (PRAVACHOL) 40 MG tablet Take 40 mg by mouth every evening 3/3/2024 at pm Yes Reported, Patient Yes    spironolactone (ALDACTONE) 25 MG tablet Take 25 mg by mouth daily 3/4/2024 at am Yes Unknown, Entered By History No    triamcinolone (KENALOG) 0.1 % cream Apply topically 3 times daily as needed for irritation  Yes Reported, Patient     Vitamin D (Cholecalciferol) 25 MCG (1000 UT) CAPS Take 1,000 Units by mouth daily 3/4/2024 at am Yes Reported, Patient

## 2024-03-04 NOTE — ED TRIAGE NOTES
Pt BIBA from [facility]. Pt went to exercise class and was having difficulty talking to people d/t SOB. Pt did the class and was still having SOB after class. Pt went to  and was found to have GALLEGO and tachycardia 12 lead shows ST. Pt denies CP. Pt denies swelling in legs. Pt received 324 asa at . Pt alert and oriented. Pt not on blood thinners.      Triage Assessment (Adult)       Row Name 03/04/24 9458          Triage Assessment    Airway WDL WDL        Respiratory WDL    Respiratory WDL X     Rhythm/Pattern, Respiratory shortness of breath;dyspnea upon exertion        Cardiac WDL    Cardiac WDL X     Pulse Rate & Regularity radial pulse regular     Cardiac Rhythm ST        Peripheral/Neurovascular WDL    Peripheral Neurovascular WDL WDL        Cognitive/Neuro/Behavioral WDL    Cognitive/Neuro/Behavioral WDL WDL                      [place] Counseling:  I discussed with the patient the risks of imiquimod including but not limited to erythema, scaling, itching, weeping, crusting, and pain.  Patient understands that the inflammatory response to imiquimod is variable from person to person and was educated regarded proper titration schedule.  If flu-like symptoms develop, patient knows to discontinue the medication and contact us.

## 2024-03-04 NOTE — H&P
[facility]    History and Physical - Hospitalist Service       Date of Admission:  3/4/2024    Assessment & Plan      Kelle Abdalla is a 83 year old female admitted on 3/4/2024 for bilateral pulmonary emboli.     Acute Hypoxic Respiratory Failure   Sinus Tachycardia   Bilateral Pulmonary Emboli  Presents with one day of shortness of breath. Moderate to large burden of pulmonary emboli on admission. No CT evidence of right heart strain. Requiring O2 via nasal cannula at 2 lpm to maintain sats >90. Tachycardic to 120. This is her second blood clot. Had a DVT years ago and no longer on anti-coagulation. Takes full strength aspirin daily but unclear indication. Her previous DVT was unprovoked. This PE is also unprovoked. Hemodynamically stable for admission to [facility] telemetry floor. Plan as follows.  - TTE and bilateral lower extremity US  - Continue heparin gtt  - Rule out COVID  - Pharmacy liaison consult for DOAC coverage  - Likely vascular medicine referral on discharge and may need prolonged anti-coagulation   - Consider IR consult on 3/5 after TTE and lower extremity US to evaluate potential need for embolectomy versus IVC filter.  - Currently holding PTA aspirin 324 mg daily    Hypertension  /89, BMP on admission normal   - PTA spironolactone 25 mg daily  - PTA amlodipine 5 mg daily  - losartan 100 mg daily     Hyperlipidemia  - Continue PTA pravastatin 40 mg daily    GERD  - Continue PTA pantoprazole 40 mg daily'    Type II DM  - Hold PTA glipizide  - Sliding scale insulin - update, patient refusing glucose checks and insulin   - Update A1c             Diet: Regular Diet Adult  NPO for Medical/Clinical Reasons Except for: Ice ChipsRegular diet, NPO at midnight   DVT Prophylaxis: On heparin gtt   Melo Catheter: Not present  Lines: None     Cardiac Monitoring: ACTIVE order. Indication: Chest pain/ ACS rule out (24 hours)  Code Status: Full CodeFull Code, patient  "specifically states she would not want to be kept alive by artificial     Clinically Significant Risk Factors Present on Admission           # Hypercalcemia: Highest Ca = 11.2 mg/dL in last 2 days, will monitor as appropriate      # Drug Induced Platelet Defect: home medication list includes an antiplatelet medication   # Hypertension: Noted on problem list      # Obesity: Estimated body mass index is 30.79 kg/m  as calculated from the following:    Height as of this encounter: 1.651 m (5' 5\").    Weight as of this encounter: 83.9 kg (185 lb).              Disposition Plan      Expected Discharge Date: 03/06/2024                  Jose Carlos Spivey DO  Hospitalist Service  [facility]  Securely message with [employer] (more info)  Text page via [employer] Paging/Directory     ______________________________________________________________________    Chief Complaint      History is obtained from the patient    History of Present Illness     Kelle Abdalla is a 83 year old female with history of DVT, hypertension, DESIREE, type II DM who presents with one day of shortness of breath. Noticed she was more short of breath than usual after an exercise class so she came to the ED. No nausea, diaphoresis or chest pain. No recent illness or sick contacts. No prolonged immobility or recent travel. States she was on anticoagulation for a blood clot in the past but nothing currently. Denies family history of clotting disorders.     Lives at home with her dog, former smoker, non-drinker, she is full code but specifically states she would not want to be kept alive by life support unless she were expected to make a full recovery and be independent.     Past Medical History    Past Medical History:   Diagnosis Date    Anxiety     Depression     Diabetes (H)     TYPE 2    Gastroesophageal reflux disease     Hyperlipidemia     Hypertension     Overactive bladder     Psoriasis     Sleep apnea     Tubular adenoma  "       Past Surgical History   Past Surgical History:   Procedure Laterality Date    CHOLECYSTECTOMY      DAVINCI SACROCOLPOPEXY, MIDURETHRAL SLING, CYSTOSCOPY N/A 10/19/2018    Procedure: DAVINCI SACROCOLPOPEXY CYSTOSCOPY AND MIDURETHRAL SLING (ANDREW) ;  Surgeon: Pool Aldana MD;  Location:  OR    GENITOURINARY SURGERY      BLADDER    LAPAROSCOPIC HYSTERECTOMY SUPRACERVICAL, BILATERAL SALPINGO-OOPHORECTOMY, COMBINED Bilateral 10/19/2018    Procedure: LAPAROSCOPY, LAPAROSCOPIC SUPRACERVICAL HYSTERECTOMY BILATERAL SALPINGO--OOPHORECTOMY (AMY) DAVINCI SACROCOLPOPEXY, MIDURETHRAL SLING, CYSTOSCOPY (ANDREW);  Surgeon: Edmond Pfeiffer MD;  Location:  OR    ORTHOPEDIC SURGERY      BUNIONECTOMY    ORTHOPEDIC SURGERY       ARTHROSCOPY OF KNEE       Prior to Admission Medications   Prior to Admission Medications   Prescriptions Last Dose Informant Patient Reported? Taking?   AmLODIPine Besylate (NORVASC PO) 3/4/2024 at am Self Yes Yes   Sig: Take 5 mg by mouth daily   Vitamin D (Cholecalciferol) 25 MCG (1000 UT) CAPS 3/4/2024 at am Self Yes Yes   Sig: Take 1,000 Units by mouth daily   alendronate (FOSAMAX) 70 MG tablet 3/3/2024  Yes Yes   Sig: Take 70 mg by mouth every 7 days On sundays   amoxicillin (AMOXIL) 500 MG capsule PRN  Yes Yes   Sig: Take 2,000 mg by mouth 1 hour Prior to dental appointment   aspirin (ASA) 81 MG chewable tablet 3/4/2024  Yes Yes   Sig: Take 324 mg by mouth daily   ciclopirox (LOPROX) 0.77 % cream 3/3/2024  Yes Yes   Sig: Apply topically at bedtime   fesoterodine fumarate (TOVIAZ) 4 MG TB24 24 hr tablet 3/4/2024 at am  Yes Yes   Sig: Take 4 mg by mouth daily   fish oil-omega-3 fatty acids 500 MG capsule 3/4/2024 at am Self Yes Yes   Sig: Take 500 mg by mouth 2 times daily   glipiZIDE (GLUCOTROL XL) 5 MG 24 hr tablet 3/4/2024 at am  Yes Yes   Sig: Take 5 mg by mouth daily   losartan (COZAAR) 100 MG tablet 3/4/2024 at am  Yes Yes   Sig: Take 100 mg by mouth daily   methylcellulose  (CITRUCEL) powder 3/4/2024 at am  Yes Yes   Sig: Take 1 teaspoonful by mouth 2 times daily   multivitamin (CENTRUM SILVER) tablet 3/4/2024 at am  Yes Yes   Sig: Take 1 tablet by mouth daily   omeprazole (PRILOSEC) 20 MG DR capsule 3/4/2024 at am  Yes Yes   Sig: Take 20 mg by mouth daily   pravastatin (PRAVACHOL) 40 MG tablet 3/3/2024 at pm Self Yes Yes   Sig: Take 40 mg by mouth every evening   spironolactone (ALDACTONE) 25 MG tablet 3/4/2024 at am  Yes Yes   Sig: Take 25 mg by mouth daily   triamcinolone (KENALOG) 0.1 % cream  Self Yes Yes   Sig: Apply topically 3 times daily as needed for irritation      Facility-Administered Medications: None        Review of Systems    The 10 point Review of Systems is negative other than noted in the HPI or here.      Social History   I have reviewed this patient's social history and updated it with pertinent information if needed.  Social History     Tobacco Use    Smoking status: Former     Packs/day: 2.00     Years: 10.00     Additional pack years: 0.00     Total pack years: 20.00     Types: Cigarettes     Quit date: 1977     Years since quittin.2    Smokeless tobacco: Never   Substance Use Topics    Alcohol use: Yes     Comment: 1-2 weekly    Drug use: No         Family History     No significant family history of blood clots      Allergies   Allergies   Allergen Reactions    Atorvastatin GI Disturbance    Ibuprofen Unknown    Pollen Extracts [Pollen Extract]         Physical Exam   Vital Signs: Temp: 97.8  F (36.6  C) Temp src: Oral BP: (!) 148/89 Pulse: (!) 122   Resp: 19 SpO2: 96 % O2 Device: Nasal cannula Oxygen Delivery: 2 LPM  Weight: 185 lbs 0 oz    Constitutional: awake, alert, cooperative, no apparent distress, and appears stated age  Eyes: Lids and lashes normal, pupils equal, round and reactive to light, extra ocular muscles intact, sclera clear, conjunctiva normal  Respiratory: No increased work of breathing, good air exchange, clear to auscultation  bilaterally, no crackles or wheezing  Cardiovascular: Normal apical impulse, regular rate and rhythm, normal S1 and S2, no S3 or S4, and no murmur noted  GI: No scars, normal bowel sounds, soft, non-distended, non-tender, no masses palpated, no hepatosplenomegally  Skin: normal skin color, texture, turgor  Musculoskeletal: There is no redness, warmth, or swelling of the joints.  Full range of motion noted.  Motor strength is 5 out of 5 all extremities bilaterally.  Tone is normal.  Neurologic: Awake, alert, oriented to name, place and time.  Cranial nerves II-XII are grossly intact.  Motor is 5 out of 5 bilaterally.  Cerebellar finger to nose, heel to shin intact.  Sensory is intact.  Babinski down going, Romberg negative, and gait is normal.    Medical Decision Making       90 MINUTES SPENT BY ME on the date of service doing chart review, history, exam, documentation & further activities per the note.      Data   ------------------------- PAST 24 HR DATA REVIEWED -----------------------------------------------    I have personally reviewed the following data over the past 24 hrs:    7.2  \   14.2   / 174     144 107 19.6 /  127 (H)   4.5 24 0.88 \     Trop: 26 (H) BNP: 60     Procal: N/A CRP: N/A Lactic Acid: 1.4         Imaging results reviewed over the past 24 hrs:   Recent Results (from the past 24 hour(s))   CT Chest Pulmonary Embolism w Contrast   Result Value    Radiologist flags Pulmonary embolism (AA)    Narrative    CT CHEST PULMONARY EMBOLISM W CONTRAST 3/4/2024 3:57 PM    CLINICAL HISTORY: hypoxia, tachycardia, hx PE  TECHNIQUE: CT angiogram chest during arterial phase injection IV  contrast. 2D and 3D MIP reconstructions were performed by the CT  technologist. Dose reduction techniques were used.     CONTRAST: 69mL isovue-370    COMPARISON: 7/23/2019    FINDINGS:  ANGIOGRAM CHEST: Filling defects in the right upper lobe pulmonary  artery, and in multiple segmental and subsegmental pulmonary arteries  in  the right upper lobe, right middle lobe, right lower lobe, left  upper lobe and left lower lobe. Thoracic aorta is negative for  dissection. No CT evidence of right heart strain.    LUNGS AND PLEURA: No infiltrate, pulmonary infarct or pleural  effusion. No pulmonary nodules or masses.    MEDIASTINUM/AXILLAE: No lymphadenopathy. No thoracic aortic aneurysm.  Mild coronary artery calcifications. No pericardial effusion. Small  hiatal hernia.    UPPER ABDOMEN: Stable left renal cyst.    MUSCULOSKELETAL: No suspicious lesions in the bones.      Impression    IMPRESSION:  1.  Positive for moderate to large burden of bilateral pulmonary  emboli as described above. No CT evidence of right heart strain.    [Critical Result: Pulmonary embolism]    Finding was identified on 3/4/2024 4:01 PM.     Dr. Nascimento was contacted by me on 3/4/2024 4:08 PM and verbalized  understanding of the critical result.     CARRIE VAZQUEZ MD         SYSTEM ID:  [id number]

## 2024-03-04 NOTE — ED PROVIDER NOTES
History     Chief Complaint:  Shortness of Breath and Tachycardia     HPI   Kelle Abdalla is a 83 year old female, with a history of type II diabetes, hypertension, DVT, PE, and hyperlipidemia who presents to the ED with her friend for evaluation of shortness of breath and tachycardia. Patient states that while walking into the [facility] for a stretch class this morning, she had a hard time breathing and talking. She proceeded with stretch class and was able to participate for the entirety of the class and speak during with no problems. On the way back out to her car, she could not walk and was having trouble breathing so she sat down. She started to feel better after doing so but due to the persistence, she decided to come in for evaluation. States she has experienced these symptoms before but it has never persisted for so long. Patient reports she has allergies and had a stuffy nose this morning but says it went away quickly. Also notes she had a blot clot approximately five years ago but is unsure of the details. Denies tachycardia or palpitations, leg swelling, and recent travel.    Independent Historian:    None    Review of External Notes:  None      Medications:    Spironolactone  Glipizide extended-release  Fesoterodine  Alendronate  Losartan  Omeprazole  Pravastatin  Amlodipine  Methylcellulose     Past Medical History:    Anxiety  Depression  Diabetes, type 2  GERD  Hyperlipidemia  Hypertension  Overactive bladder  Psoriasis  Sleep apnea  Tubular adenoma  Diverticulosis of colon without diverticulitis  Seborrheic keratoses  Ptosis  Cortical senile cataract, bilateral  Hyperparathyroidism  Bilateral carpal tunnel syndrome  Osteopenia  Chronic anticoagulation  Fibrosis of liver  Osteoarthritis, knees, bilateral  Obstructive sleep apnea syndrome  Hypercalcemia  DVT  Acute pulmonary embolism    Past Surgical History:    Cholecystectomy  Sacrocolpopexy  Mid-urethral  "sling  Cystoscopy  Supracervical hysterectomy  Bilateral salpingo-oophorectomy, combined  Bunionectomy  Arthroscopy of knee    Physical Exam   Patient Vitals for the past 24 hrs:   BP Temp Temp src Pulse Resp SpO2 Height Weight   03/04/24 1730 (!) 152/105 -- -- 120 16 97 % -- --   03/04/24 1706 (!) 184/113 -- -- (!) 125 26 -- -- --   03/04/24 1700 (!) 172/106 -- -- (!) 133 28 -- -- --   03/04/24 1600 (!) 157/88 -- -- -- -- -- -- --   03/04/24 1500 117/85 -- -- 112 16 95 % 1.651 m (5' 5\") 83.9 kg (185 lb)   03/04/24 1447 123/83 -- -- (!) 122 16 96 % -- --   03/04/24 1415 -- -- -- 112 29 95 % -- --   03/04/24 1400 -- -- -- 116 24 95 % -- --   03/04/24 1343 -- -- -- -- -- 97 % -- --   03/04/24 1339 (!) 145/83 98.3  F (36.8  C) Oral (!) 125 22 (!) 89 % -- --      Physical Exam  General: Awake, alert, in no acute distress   HEENT: Atraumatic   EOM normal   External ears normal   Trachea midline  Neck: Supple, normal ROM  CV: Tachycardic, regular rhythm   No murmur   No lower extremity edema  2+ radial and DP pulses  PULM: Breath sounds normal bilaterally  No wheezes or rales  ABD: Soft, non-tender, non-distended  Normal bowel sounds   No rebound or guarding   MSK: No gross deformities  NEURO: Alert, no focal deficits  Skin: Warm, dry and intact      Emergency Department Course   ECG  ECG taken at 1335, ECG read at 1357  Sinus tachycardia   Inferior infarct, age undetermined  Anterior infarct, age undetermined   No significant change as compared to prior dated 9/30/19.  Rate 115 bpm. CT interval 168 ms. QRS duration 80 ms. QT/QTc 316/437 ms. P-R-T axes 74 -11 54.    Imaging:  CT Chest Pulmonary Embolism w Contrast   Final Result   Abnormal   IMPRESSION:   1.  Positive for moderate to large burden of bilateral pulmonary   emboli as described above. No CT evidence of right heart strain.      [Critical Result: Pulmonary embolism]      Finding was identified on 3/4/2024 4:01 PM.       Dr. Nascimento was contacted by me on " 3/4/2024 4:08 PM and verbalized   understanding of the critical result.       CARRIE VAZQUEZ MD            SYSTEM ID:  [id number]          Laboratory:  Labs Ordered and Resulted from Time of ED Arrival to Time of ED Departure   BASIC METABOLIC PANEL - Abnormal       Result Value    Sodium 144      Potassium 4.5      Chloride 107      Carbon Dioxide (CO2) 24      Anion Gap 13      Urea Nitrogen 19.6      Creatinine 0.88      GFR Estimate 66      Calcium 11.2 (*)     Glucose 127 (*)    TROPONIN T, HIGH SENSITIVITY - Abnormal    Troponin T, High Sensitivity 26 (*)    ISTAT GASES LACTATE VENOUS POCT - Abnormal    Lactic Acid POCT 1.4      Bicarbonate Venous POCT 26      O2 Sat, Venous POCT 43 (*)     pCO2 Venous POCT 41      pH Venous POCT 7.40      pO2 Venous POCT 24 (*)    NT PROBNP INPATIENT - Normal    N terminal Pro BNP Inpatient 60     CBC WITH PLATELETS AND DIFFERENTIAL    WBC Count 7.2      RBC Count 4.74      Hemoglobin 14.2      Hematocrit 41.8      MCV 88      MCH 30.0      MCHC 34.0      RDW 13.0      Platelet Count 174      % Neutrophils 58      % Lymphocytes 29      % Monocytes 10      % Eosinophils 2      % Basophils 0      % Immature Granulocytes 1      NRBCs per 100 WBC 0      Absolute Neutrophils 4.2      Absolute Lymphocytes 2.1      Absolute Monocytes 0.7      Absolute Eosinophils 0.1      Absolute Basophils 0.0      Absolute Immature Granulocytes 0.0      Absolute NRBCs 0.0     CBC WITH PLATELETS   BLOOD CULTURE   BLOOD CULTURE      Emergency Department Course & Assessments:    Interventions:  Medications   Patient is already receiving anticoagulation with heparin, enoxaparin (LOVENOX), warfarin (COUMADIN)  or other anticoagulant medication (has no administration in time range)   heparin infusion 25,000 units in D5W 250 mL ANTICOAGULANT (1,500 Units/hr Intravenous $New Bag 3/4/24 5273)   iopamidol (ISOVUE-370) solution 69 mL (69 mLs Intravenous $Given 3/4/24 2545)   sodium chloride 0.9 % bag 100mL (93  mLs Intravenous $Given 3/4/24 1545)   heparin ANTICOAGULANT Loading dose for HIGH INTENSITY TREATMENT * Give BEFORE starting heparin infusion (6,700 Units Intravenous $Given 3/4/24 1703)      Assessments:  1403 I obtained history and performed physical exam as noted above.     Independent Interpretation (X-rays, CTs, rhythm strip):  None    Consultations/Discussion of Management or Tests:  ED Course as of 03/04/24 1738   Mon Mar 04, 2024   1500 Lactic Acid POCT: 1.4   1620 PESI score 121   1630 Spoke to Dr. Spivey who accepts admission       Social Determinants of Health affecting care:  None      Disposition:  The patient was admitted to the hospital under the care of Dr. Spivey.    Impression & Plan    Medical Decision Making:  Vitals tachycardic 125, hypoxic 89% on room air otherwise WNL on arrival.  Patient presents with exertional dyspnea since today.  Consider broad differential including ACS, viral syndrome, pulmonary embolism.  Her workup is notable for lactic acid that is WNL.  Oxygen saturations 93% and above on 2 L nasal cannula.     She desaturates to 88% with ambulation and take some time to recover without supplemental oxygen.  Requiring 2 L nasal cannula.  Her troponin is slightly elevated.  EKG is nonischemic.  BNP is WNL.  Imaging reveals bilateral segmental pulmonary embolism without radiographic right heart strain.  Patient is relatively asymptomatic except for her abnormal vital signs and 1 episode of exertional dyspnea earlier today.  I cannot identify any obvious inciting event.  She has history of DVT several years ago, may need hypercoagulable workup     PESI score is 121 placing patient in a high risk for decompensation and is requiring oxygen currently.  Due to this, recommending hospital admission.  Started on heparin gtt.  Spoke to Dr. Spivey hospitalist who kindly accepts.       Diagnosis:    ICD-10-CM    1. Bilateral pulmonary embolism (H)  I26.99       2. Acute respiratory  insufficiency  R06.89               Scribe Disclosure:  I, Holly Lorna, am serving as a scribe at 2:25 PM on 3/4/2024 to document services personally performed by Lexi Evangelista DO based on my observations and the provider's statements to me.    Scribe Disclosure:  I, Deb Cunningham, am serving as a scribe  at 2:39 PM on 3/4/2024 to document services personally performed by Lexi Evangelista DO based on my observations and the provider's statements to me.    3/4/2024   Lexi Evangelista DO Pappas Richter, Ellen, DO  03/04/24 1738

## 2024-03-04 NOTE — ED NOTES
Bed: ED05  Expected date:   Expected time:   Means of arrival:   Comments:  Mitch [id number] beverley VAZQUEZ

## 2024-03-04 NOTE — PROGRESS NOTES
.RECEIVING UNIT ED HANDOFF REVIEW    ED Nurse Handoff Report was reviewed by: Obed Russell RN on March 4, 2024 at 5:34 PM

## 2024-03-05 ENCOUNTER — APPOINTMENT (OUTPATIENT)
Dept: CARDIOLOGY | Facility: CLINIC | Age: 84
DRG: 175 | End: 2024-03-05
Attending: PHYSICIAN ASSISTANT
Payer: COMMERCIAL

## 2024-03-05 ENCOUNTER — APPOINTMENT (OUTPATIENT)
Dept: PHYSICAL THERAPY | Facility: CLINIC | Age: 84
DRG: 175 | End: 2024-03-05
Attending: STUDENT IN AN ORGANIZED HEALTH CARE EDUCATION/TRAINING PROGRAM
Payer: COMMERCIAL

## 2024-03-05 LAB
ANION GAP SERPL CALCULATED.3IONS-SCNC: 13 MMOL/L (ref 7–15)
BUN SERPL-MCNC: 19.8 MG/DL (ref 8–23)
CALCIUM SERPL-MCNC: 10.5 MG/DL (ref 8.8–10.2)
CHLORIDE SERPL-SCNC: 107 MMOL/L (ref 98–107)
CREAT SERPL-MCNC: 0.86 MG/DL (ref 0.51–0.95)
DEPRECATED HCO3 PLAS-SCNC: 23 MMOL/L (ref 22–29)
EGFRCR SERPLBLD CKD-EPI 2021: 67 ML/MIN/1.73M2
ERYTHROCYTE [DISTWIDTH] IN BLOOD BY AUTOMATED COUNT: 13.2 % (ref 10–15)
GLUCOSE BLDC GLUCOMTR-MCNC: 141 MG/DL (ref 70–99)
GLUCOSE SERPL-MCNC: 155 MG/DL (ref 70–99)
HCT VFR BLD AUTO: 39.8 % (ref 35–47)
HGB BLD-MCNC: 13.3 G/DL (ref 11.7–15.7)
LVEF ECHO: NORMAL
MCH RBC QN AUTO: 30.2 PG (ref 26.5–33)
MCHC RBC AUTO-ENTMCNC: 33.4 G/DL (ref 31.5–36.5)
MCV RBC AUTO: 90 FL (ref 78–100)
PLATELET # BLD AUTO: 169 10E3/UL (ref 150–450)
POTASSIUM SERPL-SCNC: 4.2 MMOL/L (ref 3.4–5.3)
RBC # BLD AUTO: 4.41 10E6/UL (ref 3.8–5.2)
SODIUM SERPL-SCNC: 143 MMOL/L (ref 135–145)
TROPONIN T SERPL HS-MCNC: 20 NG/L
UFH PPP CHRO-ACNC: 0.51 IU/ML
UFH PPP CHRO-ACNC: 0.64 IU/ML
UFH PPP CHRO-ACNC: 0.98 IU/ML
WBC # BLD AUTO: 6.3 10E3/UL (ref 4–11)

## 2024-03-05 PROCEDURE — 120N000001 HC R&B MED SURG/OB

## 2024-03-05 PROCEDURE — 93306 TTE W/DOPPLER COMPLETE: CPT | Mod: 26 | Performed by: INTERNAL MEDICINE

## 2024-03-05 PROCEDURE — 85027 COMPLETE CBC AUTOMATED: CPT | Performed by: STUDENT IN AN ORGANIZED HEALTH CARE EDUCATION/TRAINING PROGRAM

## 2024-03-05 PROCEDURE — 255N000002 HC RX 255 OP 636: Performed by: STUDENT IN AN ORGANIZED HEALTH CARE EDUCATION/TRAINING PROGRAM

## 2024-03-05 PROCEDURE — 97161 PT EVAL LOW COMPLEX 20 MIN: CPT | Mod: GP | Performed by: PHYSICAL THERAPIST

## 2024-03-05 PROCEDURE — 85520 HEPARIN ASSAY: CPT | Performed by: STUDENT IN AN ORGANIZED HEALTH CARE EDUCATION/TRAINING PROGRAM

## 2024-03-05 PROCEDURE — 85520 HEPARIN ASSAY: CPT | Performed by: HOSPITALIST

## 2024-03-05 PROCEDURE — 250N000011 HC RX IP 250 OP 636: Performed by: STUDENT IN AN ORGANIZED HEALTH CARE EDUCATION/TRAINING PROGRAM

## 2024-03-05 PROCEDURE — 36415 COLL VENOUS BLD VENIPUNCTURE: CPT | Performed by: HOSPITALIST

## 2024-03-05 PROCEDURE — 97116 GAIT TRAINING THERAPY: CPT | Mod: GP | Performed by: PHYSICAL THERAPIST

## 2024-03-05 PROCEDURE — 99233 SBSQ HOSP IP/OBS HIGH 50: CPT | Performed by: HOSPITALIST

## 2024-03-05 PROCEDURE — 999N000208 ECHOCARDIOGRAM COMPLETE

## 2024-03-05 PROCEDURE — 80048 BASIC METABOLIC PNL TOTAL CA: CPT | Performed by: STUDENT IN AN ORGANIZED HEALTH CARE EDUCATION/TRAINING PROGRAM

## 2024-03-05 PROCEDURE — 36415 COLL VENOUS BLD VENIPUNCTURE: CPT | Performed by: STUDENT IN AN ORGANIZED HEALTH CARE EDUCATION/TRAINING PROGRAM

## 2024-03-05 PROCEDURE — 84484 ASSAY OF TROPONIN QUANT: CPT | Performed by: STUDENT IN AN ORGANIZED HEALTH CARE EDUCATION/TRAINING PROGRAM

## 2024-03-05 PROCEDURE — 97530 THERAPEUTIC ACTIVITIES: CPT | Mod: GP | Performed by: PHYSICAL THERAPIST

## 2024-03-05 PROCEDURE — 250N000013 HC RX MED GY IP 250 OP 250 PS 637: Performed by: STUDENT IN AN ORGANIZED HEALTH CARE EDUCATION/TRAINING PROGRAM

## 2024-03-05 RX ADMIN — Medication 1 TABLET: at 13:35

## 2024-03-05 RX ADMIN — Medication 1000 UNITS: at 13:35

## 2024-03-05 RX ADMIN — HUMAN ALBUMIN MICROSPHERES AND PERFLUTREN 9 ML: 10; .22 INJECTION, SOLUTION INTRAVENOUS at 09:28

## 2024-03-05 RX ADMIN — PRAVASTATIN SODIUM 40 MG: 40 TABLET ORAL at 20:34

## 2024-03-05 RX ADMIN — HEPARIN SODIUM 1000 UNITS/HR: 10000 INJECTION, SOLUTION INTRAVENOUS at 07:49

## 2024-03-05 RX ADMIN — AMLODIPINE BESYLATE 5 MG: 5 TABLET ORAL at 13:35

## 2024-03-05 RX ADMIN — SPIRONOLACTONE 25 MG: 25 TABLET ORAL at 13:35

## 2024-03-05 RX ADMIN — PANTOPRAZOLE SODIUM 40 MG: 40 TABLET, DELAYED RELEASE ORAL at 13:35

## 2024-03-05 RX ADMIN — LOSARTAN POTASSIUM 100 MG: 100 TABLET, FILM COATED ORAL at 13:35

## 2024-03-05 ASSESSMENT — ACTIVITIES OF DAILY LIVING (ADL)
ADLS_ACUITY_SCORE: 29

## 2024-03-05 NOTE — PROGRESS NOTES
[facility]    Hospitalist Progress Note      Assessment & Plan   Kelle Abdalla is a 83 year old female admitted on 3/4/2024 for bilateral pulmonary emboli.     Acute Hypoxic Respiratory Failure   Sinus Tachycardia   Bilateral Pulmonary Emboli  DVT L LE  Presents with one day of shortness of breath. Moderate to large burden of pulmonary emboli on admission. No CT evidence of right heart strain. Requiring O2 via nasal cannula at 2 lpm to maintain sats >90. Tachycardic to 120. This is her second blood clot. Had a DVT years ago and no longer on anti-coagulation. Takes full strength aspirin daily but unclear indication. Her previous DVT was unprovoked. This PE is also unprovoked. Hemodynamically stable for admission to med telemetry floor. Plan as follows.  - TTE: See note, normal LV function, EF 60 to 65%, RV mild dilatation with decreased systolic function increase systolic pressure  -bilateral lower extremity US L LE DVT  - Continue heparin gtt  -COVID testing negative this hospitalization, patient states past COVID-positive summer 2023  - Pharmacy liaison consult for DOAC coverage, last treatment for DVT patient preferred DOAC versus Coumadin.  - Likely vascular medicine referral on discharge and may need prolonged anti-coagulation   - IR consult, see note, spoke with IR no plans at this time for embolectomy/IVC filter however continue to monitor patient, for now we will keep on heparin drip before transition DUAC.    - Currently holding PTA aspirin 324 mg daily     Hypertension   -BMP on admission normal   - PTA spironolactone 25 mg daily  - PTA amlodipine 5 mg daily  - losartan 100 mg daily   Monitor BP, adjust antihypertensive accordingly.     Hyperlipidemia  - Continue PTA pravastatin 40 mg daily     GERD  - Continue PTA pantoprazole 40 mg daily'     Type II DM  - Hold PTA glipizide  - Sliding scale insulin - update, patient refusing glucose checks and insulin   - Update A1c =  6.8  -Modified CHO diet.       DVT Prophylaxis: On heparin drip  Code Status: Full Code     Expected Discharge Date: 03/08/2024               Nakia Lopez MD  Text Page (7am - 6pm, M-F)    Total time 50 minutes for today 3/5/2024 :   time consisted of the following, assuming Patient care with review of records including labs, imaging results, medications, interdisciplinary notes; examination of Patient;  and completing documentation and orders.  Care Management included counseling/discussion with Patient  regarding current condition including PE, DVT, history COVID, diet-controlled diabetes and Coordination of Care time with Nursing regarding and Specialists, IR regarding care plan, management and surveillance, as above.     Interval History   Mild dyspnea, O2 desat on O2 per NC at 2 L, at this point has not been ambulatory.  Denies chest pain.  As above, past history DVT 2 to 3 years ago.    SH: No tobacco.  PTA lived independently in [facility], exercises 3 times per week        ROS: Complete ROS negative except as above.     -Data reviewed today: I reviewed all new labs and imaging results over the last 24 hours.   Temp: 98.1  F (36.7  C) Temp src: Oral BP: (!) 151/71 Pulse: 89   Resp: 16 SpO2: 94 % O2 Device: Nasal cannula Oxygen Delivery: 2.5 LPM  Vitals:    03/04/24 1500   Weight: 83.9 kg (185 lb)     General/Constitutional:   NAD, alert, calm, cooperative    Chest/Respiratory: Respirations nonlabored O2 per NC at 2 L  Cardiovascular:  regular, no murmur appreciated.   Gastrointestinal/Abdomen:  soft, nontender, no rebound, guarding or other peritoneal signs.  Neuro.  Gross motor tested, nonfocal,  Psych oriented, affect calm      Medications    heparin 1,000 Units/hr (03/05/24 0749)    - MEDICATION INSTRUCTIONS -        amLODIPine  5 mg Oral Daily    losartan  100 mg Oral Daily    multivitamin w/minerals  1 tablet Oral Daily    pantoprazole  40 mg Oral Daily    pravastatin  40 mg Oral QPM    sodium  chloride (PF)  3 mL Intracatheter Q8H    spironolactone  25 mg Oral Daily    Vitamin D3  1,000 Units Oral Daily       Data   Recent Labs   Lab 03/05/24  0942 03/05/24  0527 03/04/24  1828 03/04/24  1422   WBC  --  6.3 8.3 7.2   HGB  --  13.3 13.9 14.2   MCV  --  90 89 88   PLT  --  169 174 174   NA  --  143  --  144   POTASSIUM  --  4.2  --  4.5   CHLORIDE  --  107  --  107   CO2  --  23  --  24   BUN  --  19.8  --  19.6   CR  --  0.86  --  0.88   ANIONGAP  --  13  --  13   ROSE  --  10.5*  --  11.2*   * 155*  --  127*       Recent Results (from the past 24 hour(s))   CT Chest Pulmonary Embolism w Contrast   Result Value    Radiologist flags Pulmonary embolism (AA)    Narrative    CT CHEST PULMONARY EMBOLISM W CONTRAST 3/4/2024 3:57 PM    CLINICAL HISTORY: hypoxia, tachycardia, hx PE  TECHNIQUE: CT angiogram chest during arterial phase injection IV  contrast. 2D and 3D MIP reconstructions were performed by the CT  technologist. Dose reduction techniques were used.     CONTRAST: 69mL [id number]    COMPARISON: 7/23/2019    FINDINGS:  ANGIOGRAM CHEST: Filling defects in the right upper lobe pulmonary  artery, and in multiple segmental and subsegmental pulmonary arteries  in the right upper lobe, right middle lobe, right lower lobe, left  upper lobe and left lower lobe. Thoracic aorta is negative for  dissection. No CT evidence of right heart strain.    LUNGS AND PLEURA: No infiltrate, pulmonary infarct or pleural  effusion. No pulmonary nodules or masses.    MEDIASTINUM/AXILLAE: No lymphadenopathy. No thoracic aortic aneurysm.  Mild coronary artery calcifications. No pericardial effusion. Small  hiatal hernia.    UPPER ABDOMEN: Stable left renal cyst.    MUSCULOSKELETAL: No suspicious lesions in the bones.      Impression    IMPRESSION:  1.  Positive for moderate to large burden of bilateral pulmonary  emboli as described above. No CT evidence of right heart strain.    [Critical Result: Pulmonary  embolism]    Finding was identified on 3/4/2024 4:01 PM.     Dr. Nascimento was contacted by me on 3/4/2024 4:08 PM and verbalized  understanding of the critical result.     CARRIE VAZQUEZ MD         SYSTEM ID:  [id number]   US Lower Extremity Venous Duplex Bilateral    Addendum: 3/4/2024    The findings were relayed to TREVON Red on 03/04/2024 at 10:00 PM      Narrative    EXAM: US LOWER EXTREMITY VENOUS DUPLEX BILATERAL  LOCATION: [facility]  DATE: 3/4/2024    INDICATION: Lower extremity pain/swelling  COMPARISON: None.  TECHNIQUE: Venous Duplex ultrasound of bilateral lower extremities with and without compression, augmentation and duplex. Color flow and spectral Doppler with waveform analysis performed.    FINDINGS: Exam includes the common femoral, femoral, popliteal veins as well as segmentally visualized deep calf veins and greater saphenous vein.     RIGHT: No deep vein thrombosis. No superficial thrombophlebitis. No popliteal cyst.    LEFT: Intraluminal echogenic material and partial compressibility of the common femoral and profunda femoral veins. Patent duplicated femoral veins. Intraluminal echogenic material and partial compressibility of the popliteal, proximal/mid calf posterior   tibial and proximal/mid calf peroneal veins. No superficial thrombophlebitis. No popliteal cyst.      Impression    IMPRESSION:  1.  RIGHT: No deep vein thrombosis.  2.  LEFT: Acute deep vein thrombosis involving the common femoral, profunda femoral, popliteal, posterior tibial and peroneal veins as described above. Duplicated femoral vein. Interventional radiology consultation is suggested.    The findings were relayed to Dr. Griffiths on 03/04/2024 at 9:25 PM   Echocardiogram Complete   Result Value    LVEF  60-65%    Narrative    [id number]  [id number]  [id number]  [id number]^SIGNORELNATHAN^KARSON^[facility]  Echocardiography Laboratory  [address] [place] [address]     Name:  ANA MARIA HOLLINGSWORTH  MRN: [id number]  : 1940  Study Date: 2024 08:44 AM  Age: 83 yrs  Gender: Female  Patient Location: [facility]  Reason For Study: Pulmonary Emboli  Ordering Physician: KARSON ABURTO  Referring Physician: Margoth Velazquez  Performed By: Smiley Wilson     BSA: 1.9 m2  Height: 65 in  Weight: 185 lb  HR: 82  BP: 118/59 mmHg  ______________________________________________________________________________  Procedure  Complete Portable Echo Adult. Optison (NDC #[id number]) given intravenously.  ______________________________________________________________________________  Interpretation Summary     Left ventricular systolic function is normal.  The visual ejection fraction is 60-65%.  The right ventricle is mildly dilated with mildly decreased systolic function,  particularly the mid to distal right ventricle.  Trace tricuspid valve regurgitation.  The right ventricular systolic pressure is approximated at 46 mmHg plus the  right atrial pressure.  Normal inferior vena cava.  No pericardial effusion.     Compared to the study dated 2019, right ventricular systolic function is  reduced and tricuspid valve regurgitation has decreased from moderate to  trace, estimated RVSP is similar.     ______________________________________________________________________________  Left Ventricle  The left ventricle is normal in size. There is normal left ventricular wall  thickness. Left ventricular systolic function is normal. The visual ejection  fraction is 60-65%. Grade I or early diastolic dysfunction. No regional wall  motion abnormalities noted.     Right Ventricle  The right ventricle is mildly dilated. Mildly decreased right ventricular  systolic function.     Atria  Normal left atrial size. Right atrial size is normal. There is no atrial shunt  seen.     Mitral Valve  The mitral valve leaflets appear normal. There is no evidence of stenosis,  fluttering, or prolapse. There is mild  mitral annular calcification. The  mitral valve leaflets are mildly thickened. There is trace mitral  regurgitation. There is no mitral valve stenosis.     Tricuspid Valve  Normal tricuspid valve. There is trace tricuspid regurgitation. The right  ventricular systolic pressure is approximated at 45.6 mmHg plus the right  atrial pressure.     Aortic Valve  The aortic valve is trileaflet with aortic valve sclerosis. There is trace  aortic regurgitation. No aortic stenosis is present.     Pulmonic Valve  The pulmonic valve is not well visualized. There is trace pulmonic valvular  regurgitation. Normal pulmonic valve velocity.     Vessels  The aortic root is normal size. Normal size ascending aorta. The inferior vena  cava is normal.     Pericardium  There is no pericardial effusion.     Rhythm  Sinus rhythm was noted.  ______________________________________________________________________________  MMode/2D Measurements & Calculations  IVSd: 0.87 cm     LVIDd: 3.4 cm  LVIDs: 2.3 cm  LVPWd: 0.89 cm  FS: 30.0 %  LV mass(C)d: 80.6 grams  LV mass(C)dI: 42.1 grams/m2  Ao root diam: 2.7 cm  LA dimension: 2.8 cm  asc Aorta Diam: 2.8 cm  LA/Ao: 1.0  Ao root diam index Ht(cm/m): 1.7  Ao root diam index BSA (cm/m2): 1.4  Asc Ao diam index BSA (cm/m2): 1.4  Asc Ao diam index Ht(cm/m): 1.7  LA Volume (BP): 44.3 ml     LA Volume Index (BP): 23.2 ml/m2  RV Base: 3.6 cm  RWT: 0.53  TAPSE: 2.9 cm     Doppler Measurements & Calculations  MV E max santiago: 59.5 cm/sec  MV A max santiago: 92.4 cm/sec  MV E/A: 0.64  MV dec slope: 222.4 cm/sec2  MV dec time: 0.27 sec  TR max santiago: 337.5 cm/sec  TR max P.6 mmHg  E/E' av.4  Lateral E/e': 8.1  Medial E/e': 8.7     ______________________________________________________________________________  Report approved by: Dr Jose Ortiz 2024 10:35 AM

## 2024-03-05 NOTE — CARE PLAN
Care Plan Update:    I spoke with Dr. Shepherd of radiology. Patient has an acute deep vein thrombosis of the left common femoral, profunda femoral, popliteal, posterior tibial and peroneal veins. Patient will be NPO at midnight. Currently on heparin gtt. Holding her prior to admission aspirin.    Plan:  - NPO at midnight  - Continue heparin gtt  - IR consult placed to evaluate for thrombectomy  - TTE ordered    Jose Carlos Spivey DO

## 2024-03-05 NOTE — PLAN OF CARE
Summary:  admitted from ED for SOB, found to have bilateral PEs  DATE & TIME: 03/04/24 4632-7077 Cognitive Concerns/ Orientation : Aox4, calm and cooperative, Habematolel - has bilateral hearing aides   BEHAVIOR & AGGRESSION TOOL COLOR: Green  ABNL VS/O2: VSS on 2L NC (sating 93-97%), tachy (100s-120s)  MOBILITY: SBA w/ GB  PAIN MANAGMENT: denies  DIET: Regular, NPO at midnight  BOWEL/BLADDER: continent of B/B, can dribble per pt report, c/o constipation, given PRN senna x1  ABNL LAB/BG: A1c 6.8, trop 26, hep 10a > 1.10,   DRAIN/DEVICES: L AC PIV infusing heparin @ 1500 units/hr, paused for 1 hr starting at 2253 d/t high hep 10a  TELEMETRY RHYTHM: SR - ST  SKIN: intact, scattered bruises, redness on coccyx d/t psoriasis   TESTS/PROCEDURES: CT Chest PE done in ED, and BLE US done this evening, possible FOZIA tomorrow, Echo ordered  D/C DAY/GOALS/PLACE: TBD  OTHER IMPORTANT INFO: declined BG check and insulin - MD notified and d/c orders. LS diminished,, PT/OT consults placed, US found LLE w/ extensive DVT, [facility] called, and update MD. IR consult placed for possible thrombectomy tomorrow (03/05)

## 2024-03-05 NOTE — PROGRESS NOTES
Patient refused CPAP for the night. Patient preferred to be without and stated she should be fine. Will continue to monitor and ready CPAP if necessary.     Dustin Berrios, RT

## 2024-03-05 NOTE — PROVIDER NOTIFICATION
".MD Notification    Notified Person: MD    Notified Person Name: Jose Carlos Spivey    Notification Date/Time: 03/0424 @ 1911    Notification Interaction: [employer] Messaging    Purpose of Notification: \"Hey, attempted to check pt's BG. pt declined and stated \"I've never checked my blood sugar\". educated patient on importance of blood glucose check and asked if she takes insulin at home, pt declined. Also, attempted to place new name band w/ updated code status. Pt questioned what code status was, and I explained it. Also stated that she might have had a conversation in ED w/ physician. She agreed that she had a convo in ED w/ MD, but didn't understand it and not sure if she wants full code status. Can you come amd have a conversation about code statuses, and see if she still wants to be full code? Thanks.\"    Orders Received: MD d/c BG checks and insulin. MD explained spoke to patient and wrote note on explaining on convo. Had a convo w/ pt again and explained the conversation she had w/ MD. Pt understood fully on full code status and wished to be on it.    Comments:   "

## 2024-03-05 NOTE — CONSULTS
Patient has Medicare Advantage through [employer].    Eliquis/Xarelto  --$47/mo.  --When total drug costs exceed $5,030 price will increase to a 25% coinsurance, equivalent to $146/mo.    Lucrecia Valdez  Pharmacy Technician/Liaison, Discharge Pharmacy   [phone] (voice or text)  [email or web address]

## 2024-03-05 NOTE — PROGRESS NOTES
"   03/05/24 1200   Appointment Info   Signing Clinician's Name / Credentials (PT) Aneesh Díaz DPT       Present no   Living Environment   People in Home alone   Current Living Arrangements house   Home Accessibility stairs to enter home   Number of Stairs, Main Entrance 2   Stair Railings, Main Entrance none   Transportation Anticipated family or friend will provide   Living Environment Comments Pt lives in a house alone. Stairs to enter. Pt reports a family member will pick pt up upon discharge and provide assist as needed.   Self-Care   Usual Activity Tolerance good   Current Activity Tolerance moderate   Regular Exercise No   Equipment Currently Used at Home none   Fall history within last six months no   Activity/Exercise/Self-Care Comment Pt reports being IND at baseline with all ADLs. Pt ambulates w/o an AD at baseline but has a FWW or SEC if needed. Pt drives.   General Information   Onset of Illness/Injury or Date of Surgery 03/05/24   Referring Physician Jose Carlos Spivey, DO   Patient/Family Therapy Goals Statement (PT) \"To get better\"   Pertinent History of Current Problem (include personal factors and/or comorbidities that impact the POC) Per Chart: Kelle Abdalla is a 83 year old female admitted on 3/4/2024 for bilateral pulmonary emboli.   Existing Precautions/Restrictions fall   Weight-Bearing Status - LLE full weight-bearing   Weight-Bearing Status - RLE full weight-bearing   Cognition   Orientation Status (Cognition) oriented x 4   Pain Assessment   Patient Currently in Pain No   Integumentary/Edema   Integumentary/Edema no deficits were identifed   Posture    Posture Forward head position;Protracted shoulders   Range of Motion (ROM)   Range of Motion ROM is WFL   Strength (Manual Muscle Testing)   Strength (Manual Muscle Testing) Able to perform R SLR;Able to perform L SLR   Bed Mobility   Comment, (Bed Mobility) Supine>sit w/ SBA   Transfers   Comment, (Transfers) " Sit>stand w/o AD and SBA   Gait/Stairs (Locomotion)   [place] Level (Gait) supervision   Assistive Device (Gait)   (no AD)   Distance in Feet (Gait) 5'   Balance   Balance Comments Adequate static sitting balance; mild unsteadiness without AD w/ OOB activity   Sensory Examination   Sensory Perception patient reports no sensory changes   Clinical Impression   Criteria for Skilled Therapeutic Intervention Yes, treatment indicated   PT Diagnosis (PT) Impaired gait   Influenced by the following impairments Decreased activity tolerance; decreased balance; increased O2 needs   Functional limitations due to impairments Impaired functional mobility   Clinical Presentation (PT Evaluation Complexity) stable   Clinical Presentation Rationale Clinical judgement   Clinical Decision Making (Complexity) low complexity   Planned Therapy Interventions (PT) balance training;bed mobility training;gait training;patient/family education;stair training;strengthening;transfer training;progressive activity/exercise   Risk & Benefits of therapy have been explained evaluation/treatment results reviewed;care plan/treatment goals reviewed;risks/benefits reviewed;current/potential barriers reviewed;participants voiced agreement with care plan;participants included;patient   PT Total Evaluation Time   PT Eval, Low Complexity Minutes (81356) 10   Physical Therapy Goals   PT Frequency Daily   PT Predicted Duration/Target Date for Goal Attainment 03/10/24   PT Goals Transfers;Bed Mobility;Gait;Stairs   PT: Bed Mobility Supervision/stand-by assist;Supine to/from sit;Goal Met   PT: Transfers Supervision/stand-by assist;Sit to/from stand;Goal Met   PT: Gait Supervision/stand-by assist;150 feet;Assistive device   PT: Stairs Supervision/stand-by assist;3 stairs;Rail on right   Interventions   Interventions Quick Adds Gait Training;Therapeutic Activity   Therapeutic Activity   Therapeutic Activities: dynamic activities to improve functional  performance Minutes (44539) 9   Symptoms Noted During/After Treatment Shortness of breath   Treatment Detail/Skilled Intervention Greeted pt supine in bed, agreed to PT. Pt on 2L O2 via NC, sating >93% SpO2 throughout session. Pt performed supine>sit w/ SBA. Once in sitting, pt able to scoot self to EOB and sit unsupported without LOB. Pt performed sit>stand x 6 w/o AD and SBA, verbal cues for hand placement. After ambulation, pt returned to supine w/ SBA, demonstrating ability to safely lift BLEs back into bed and reposition self. Pt ended session supine in bed, with all needs met and call light within reach.   Gait Training   Gait Training Minutes (48790) 10   Symptoms Noted During/After Treatment (Gait Training) fatigue;shortness of breath   Treatment Detail/Skilled Intervention Pt ambulated w/o AD and SBA. Pt ambulated with decreased gait speed, downward gaze, decreased step length, and wide LUCINA. Verbal cues for upright gaze and posture, to increase step length, and to narrow LUCINA. Increased unsteadiness when attempting to abide by cues. PT provided education on benefits of using an AD for improved balance, pt willing to use FWW. Pt with significant increase in balance using a FWW, recommending to pt to use at discharge and throughout hospital stay, pt in agreement. Pt unable to ambulate further citing fatigue. No LOB noted.   Distance in Feet 70'   PT Discharge Planning   PT Plan Progress gait w/ FWW vs SEC; trial stairs (3 w/ rail); monitor O2; likely DC after next session   PT Discharge Recommendation (DC Rec) home with assist   PT Rationale for DC Rec Pt is below baseline but is moving well. Pt currently requiring SBA for all functional mobility. Anticipate with further medical management and IP PT pt will progress to safely return home at discharge. Pt has access to assist as needed. Recommend pt use a FWW at discharge for improved balance. Pt already owns FWW.   PT Brief overview of current status SBA w/  FWW   Total Session Time   Timed Code Treatment Minutes 19   Total Session Time (sum of timed and untimed services) 29

## 2024-03-05 NOTE — PROVIDER NOTIFICATION
".MD Notification    Notified Person: MD    Notified Person Name:Jose Carlos Spivey    Notification Date/Time: 03/04/24 @2206    Notification Interaction: [employer] Messaging     Purpose of Notification:\"Hello, just an update in regards to pt's US. She was found to have extensive DVT on her leg. I received a call from [facility] to try to reach the physician following her. It is Dr. Shepherd, w/ cellphone [phone]. He was thinking of her needed a IR thrombectomy. Please give him a call to try to coordinate. Thanks. Her left leg\"    Orders Received: MD acknowledged message and spoke to the radiologist. MD placed an IR consult for tomorrow (03/05) morning    Comments:   "

## 2024-03-05 NOTE — CONSULTS
"  Interventional Radiology - Pre-Procedure Note:  3/5/2024    Procedure Requested: Evaluation for PE thrombectomy  Requested by: Dr Spivey    History and Physical Reviewed: H&P documented within 30 days (by Dr Spivey on 3/4/24).  I have personally reviewed the patient's medical history and have updated the medical record as necessary.    Brief HPI: Kelle Abdalla is a 83 year old female w h/o HTN, DVT, DESIREE, T2DM who presented w SOB. Imaging demonstrated PE and subsequently DVT. Patient is on 2L O2 via NC. O2 demands increase with activity. Denies CP. Patient was started on heparin IV upon diagnosis and IR consulted for above.      IMAGING:  Echocardiogram 3/5/24:  \"Interpretation Summary     Left ventricular systolic function is normal.  The visual ejection fraction is 60-65%.  The right ventricle is mildly dilated with mildly decreased systolic function,  particularly the mid to distal right ventricle.  Trace tricuspid valve regurgitation.  The right ventricular systolic pressure is approximated at 46 mmHg plus the  right atrial pressure.  Normal inferior vena cava.  No pericardial effusion.     Compared to the study dated 7/24/2019, right ventricular systolic function is  reduced and tricuspid valve regurgitation has decreased from moderate to  trace, estimated RVSP is similar.\"    CT Chest PE protocol 3/4/24:  \"IMPRESSION:  1.  Positive for moderate to large burden of bilateral pulmonary  emboli as described above. No CT evidence of right heart strain\"    BLE Venous US 3/4/24:  \"FINDINGS: Exam includes the common femoral, femoral, popliteal veins as well as segmentally visualized deep calf veins and greater saphenous vein.      RIGHT: No deep vein thrombosis. No superficial thrombophlebitis. No popliteal cyst.     LEFT: Intraluminal echogenic material and partial compressibility of the common femoral and profunda femoral veins. Patent duplicated femoral veins. Intraluminal echogenic material and partial " "compressibility of the popliteal, proximal/mid calf posterior   tibial and proximal/mid calf peroneal veins. No superficial thrombophlebitis. No popliteal cyst.                                                                      IMPRESSION:  1.  RIGHT: No deep vein thrombosis.  2.  LEFT: Acute deep vein thrombosis involving the common femoral, profunda femoral, popliteal, posterior tibial and peroneal veins as described above. Duplicated femoral vein. Interventional radiology consultation is suggested\"    NPO: YES since [place]  ANTICOAGULANTS: Heparin IV per therapeutic protocol    ALLERGIES  Allergies   Allergen Reactions    Atorvastatin GI Disturbance    Ibuprofen Unknown    Pollen Extracts [Pollen Extract]          LABS:  INR   Date Value Ref Range Status   07/23/2019 0.98 0.86 - 1.14 Final      Hemoglobin   Date Value Ref Range Status   03/05/2024 13.3 11.7 - 15.7 g/dL Final   09/30/2019 13.4 11.7 - 15.7 g/dL Final   ]  Platelet Count   Date Value Ref Range Status   03/05/2024 169 150 - 450 10e3/uL Final   09/30/2019 196 150 - 450 10e9/L Final     Creatinine   Date Value Ref Range Status   03/05/2024 0.86 0.51 - 0.95 mg/dL Final   09/30/2019 1.13 (H) 0.52 - 1.04 mg/dL Final     Potassium   Date Value Ref Range Status   03/05/2024 4.2 3.4 - 5.3 mmol/L Final   09/30/2019 3.7 3.4 - 5.3 mmol/L Final         EXAM:  BP (!) 151/71 (BP Location: Right arm)   Pulse 89   Temp 98.1  F (36.7  C) (Oral)   Resp 16   Ht 1.651 m (5' 5\")   Wt 83.9 kg (185 lb)   SpO2 94%   BMI 30.79 kg/m    General:  Stable.  In no acute distress.    Neuro:  A&O x 3. Moves all extremities equally.  Heart: RRR  Lungs: No increased work of breathing on O2 via NC      Pre-Sedation Code Status Assessment:  Code Status: Full Code intra procedure, per discussion with patient      ASSESSMENT/PLAN:   Bilateral PE  LLE DVT    -BNP is WNL, mild troponin elevation. Mild dilatation of RV  -Recommend conservative management with anticoagulation  -Continue " heparin IV overnight  -If no acute events overnight, OK to switch to DOAC tomorrow  -Second thrombotic event, may need long-term anticoagulation  -If patient decompensates, please contact IR immediately and make NPO  -All above reviewed w Dr Serna who is in agreement with plan  -Discussed w Dr oLpez    Risks/benefits, details, alternatives of treatment options reviewed with patient and patient verbalized understanding and agreement with plan. All questions answered.     Edmond Chen PA-C  Interventional Radiology  *[phone]      Total Time: 50 minutes

## 2024-03-05 NOTE — PROGRESS NOTES
.Admission    Patient arrives to room 601 via cart from ED.  Care plan note: pt admitted after leaving a gym class and being SOB. Pt found to be tachycardic and bilateral PE seen on CT. Pt started on IV heparin at 1500 units/hr    Inpatient nursing criteria listed below were met:    Did you put disposition on whiteboard and in sticky note: NA  Full skin assessment done (add LDA if skin issue present). Initials of 2nd RN :Yes, pt declined male RN for skin assessment. BM and IO done full skin assessment  Isolation education started/completed NA  Patient allergies verified with patient: Yes  Fall Risk? (Care plan updated, Education given and documented) Yes  Primary Care Plan initiated: Yes  Home medications documented in belongings flowsheet: NA  Patient belongings documented in belongings flowsheet: Yes  Reminder note (belongings/ medications) placed in discharge instructions:Yes  Admission profile/ required documentation complete: Yes  If patient is a 72 hour hold/Commitment are belongings removed from room and locked up? NA

## 2024-03-05 NOTE — PLAN OF CARE
Goal Outcome Evaluation:      Plan of Care Reviewed With: patient    Overall Patient Progress: no changeOverall Patient Progress: no change     Summary:  admitted from ED for SOB, found to have bilateral PEs, LLE DVT.   DATE & TIME: 03/04-03/05 [id number]  Cognitive Concerns/ Orientation : A&Ox4, calm and cooperative, [place] - has bilateral hearing aides. Refused CPAP.   BEHAVIOR & AGGRESSION TOOL COLOR: Green  ABNL VS/O2: VSS on 2L NC- tachy at times. [place].   MOBILITY: SBA w/ GB  PAIN MANAGMENT: denies  DIET: NPO   BOWEL/BLADDER: continent of B/B- up to bathroom, dribbles at times per patient report.  PRN keerthi received on prior shift- BM x1 this shift.   ABNL LAB/BG: A1c 6.8, trop 26/20- tending down, hep 10a .98, next hep 10A check at 1341  DRAIN/DEVICES: L AC PIV infusing heparin @ 1200u/hr- heparin paused at 0643-restart at 0743- subtracting 200.   TELEMETRY RHYTHM: SR  SKIN: intact, scattered bruises, redness on coccyx d/t psoriasis   TESTS/PROCEDURES: possible TTE today, possible thrombectomy. Echo ordered.   D/C DAY/GOALS/PLACE: TBD  OTHER IMPORTANT INFO: LS diminished, PT/OT consults placed, US found LLE w/ extensive DVT, [facility] called, and update MD. IR consult placed for possible thrombectomy today (03/05). Type 2 DM but refusing checks- glucose checks d/c'd.

## 2024-03-05 NOTE — PLAN OF CARE
Summary:  admitted from ED for SOB, found to have bilateral PEs/LLE DVT  DATE & TIME: 03/05/24 AM shift  Cognitive Concerns/ Orientation : Aox4, calm and cooperative, [place] - has bilateral hearing aides   BEHAVIOR & AGGRESSION TOOL COLOR: Green  ABNL VS/O2: VSS on 2L NC (sating 93-95%), tachy at times. Failed attempt at weaning off oxygen today  MOBILITY: SBA w/ GB  PAIN MANAGMENT: denies  DIET: NPO most of the day; mod carb at 2 pm.   BOWEL/BLADDER: continent of B/B, can dribble per pt report, BM x 2 today.   ABNL LAB/BG: n/a  DRAIN/DEVICES: PIV infusing heparin @ 1000 units/hr, recheck pending  TELEMETRY RHYTHM: SR - ST  SKIN: intact, scattered bruises, redness on coccyx d/t psoriasis   TESTS/PROCEDURES: echo completed  D/C DAY/GOALS/PLACE: no plan for thrombectomy at this time- plan to continue hep gtt thru the night. However if any changes in pt status-RN to notify IR right away.   OTHER IMPORTANT INFO: PT/OT are following.

## 2024-03-06 ENCOUNTER — APPOINTMENT (OUTPATIENT)
Dept: PHYSICAL THERAPY | Facility: CLINIC | Age: 84
DRG: 175 | End: 2024-03-06
Payer: COMMERCIAL

## 2024-03-06 ENCOUNTER — APPOINTMENT (OUTPATIENT)
Dept: OCCUPATIONAL THERAPY | Facility: CLINIC | Age: 84
DRG: 175 | End: 2024-03-06
Attending: STUDENT IN AN ORGANIZED HEALTH CARE EDUCATION/TRAINING PROGRAM
Payer: COMMERCIAL

## 2024-03-06 LAB
ERYTHROCYTE [DISTWIDTH] IN BLOOD BY AUTOMATED COUNT: 13.3 % (ref 10–15)
HCT VFR BLD AUTO: 40.6 % (ref 35–47)
HGB BLD-MCNC: 13.5 G/DL (ref 11.7–15.7)
MCH RBC QN AUTO: 30.1 PG (ref 26.5–33)
MCHC RBC AUTO-ENTMCNC: 33.3 G/DL (ref 31.5–36.5)
MCV RBC AUTO: 90 FL (ref 78–100)
PLATELET # BLD AUTO: 164 10E3/UL (ref 150–450)
RBC # BLD AUTO: 4.49 10E6/UL (ref 3.8–5.2)
UFH PPP CHRO-ACNC: 0.71 IU/ML
WBC # BLD AUTO: 6.7 10E3/UL (ref 4–11)

## 2024-03-06 PROCEDURE — 97530 THERAPEUTIC ACTIVITIES: CPT | Mod: GP

## 2024-03-06 PROCEDURE — 93005 ELECTROCARDIOGRAM TRACING: CPT

## 2024-03-06 PROCEDURE — 85520 HEPARIN ASSAY: CPT | Performed by: HOSPITALIST

## 2024-03-06 PROCEDURE — 85027 COMPLETE CBC AUTOMATED: CPT | Performed by: HOSPITALIST

## 2024-03-06 PROCEDURE — 97530 THERAPEUTIC ACTIVITIES: CPT | Mod: GO

## 2024-03-06 PROCEDURE — 36415 COLL VENOUS BLD VENIPUNCTURE: CPT | Performed by: HOSPITALIST

## 2024-03-06 PROCEDURE — 93010 ELECTROCARDIOGRAM REPORT: CPT | Performed by: INTERNAL MEDICINE

## 2024-03-06 PROCEDURE — 120N000001 HC R&B MED SURG/OB

## 2024-03-06 PROCEDURE — 97165 OT EVAL LOW COMPLEX 30 MIN: CPT | Mod: GO

## 2024-03-06 PROCEDURE — 97116 GAIT TRAINING THERAPY: CPT | Mod: GP

## 2024-03-06 PROCEDURE — 250N000013 HC RX MED GY IP 250 OP 250 PS 637: Performed by: STUDENT IN AN ORGANIZED HEALTH CARE EDUCATION/TRAINING PROGRAM

## 2024-03-06 PROCEDURE — 250N000013 HC RX MED GY IP 250 OP 250 PS 637: Performed by: HOSPITALIST

## 2024-03-06 PROCEDURE — 99232 SBSQ HOSP IP/OBS MODERATE 35: CPT | Performed by: HOSPITALIST

## 2024-03-06 RX ADMIN — SPIRONOLACTONE 25 MG: 25 TABLET ORAL at 08:27

## 2024-03-06 RX ADMIN — AMLODIPINE BESYLATE 5 MG: 5 TABLET ORAL at 08:27

## 2024-03-06 RX ADMIN — Medication 1 TABLET: at 08:27

## 2024-03-06 RX ADMIN — LOSARTAN POTASSIUM 100 MG: 100 TABLET, FILM COATED ORAL at 08:27

## 2024-03-06 RX ADMIN — PRAVASTATIN SODIUM 40 MG: 40 TABLET ORAL at 20:13

## 2024-03-06 RX ADMIN — PANTOPRAZOLE SODIUM 40 MG: 40 TABLET, DELAYED RELEASE ORAL at 08:27

## 2024-03-06 RX ADMIN — Medication 1000 UNITS: at 08:27

## 2024-03-06 RX ADMIN — APIXABAN 10 MG: 5 TABLET, FILM COATED ORAL at 12:14

## 2024-03-06 RX ADMIN — APIXABAN 10 MG: 5 TABLET, FILM COATED ORAL at 20:13

## 2024-03-06 ASSESSMENT — ACTIVITIES OF DAILY LIVING (ADL)
ADLS_ACUITY_SCORE: 29

## 2024-03-06 NOTE — PLAN OF CARE
Goal Outcome Evaluation:       Summary:  admitted from ED for SOB, found to have bilateral PEs/LLE DVT    Cognitive Concerns/ Orientation : Aox4, calm and cooperative, [place] - has bilateral hearing aides   BEHAVIOR & AGGRESSION TOOL COLOR: Green  ABNL VS/O2: VSS on 2L NC (sating 93-95%), tachy at times. Failed attempt at weaning off oxygen today  MOBILITY: SBA w/ GB  PAIN MANAGMENT: denies  DIET: mod carb .   BOWEL/BLADDER: continent of B/B, can dribble per pt report, BM x 2 today.   ABNL LAB/BG: n/a  DRAIN/DEVICES: PIV infusing heparin @ 1000 units/hr, recheck 9 PM  TELEMETRY RHYTHM: SR - ST  SKIN: intact, scattered bruises, redness on coccyx d/t psoriasis   TESTS/PROCEDURES: echo completed  D/C DAY/GOALS/PLACE: no plan for thrombectomy at this time- plan to continue hep gtt thru the night. However if any changes in pt status-RN to notify IR right away.   OTHER IMPORTANT INFO: PT/OT are following.

## 2024-03-06 NOTE — PROGRESS NOTES
[facility]    Hospitalist Progress Note      Assessment & Plan   Kelle Abdalla is a 83 year old female admitted on 3/4/2024 for bilateral pulmonary emboli.     Acute Hypoxic Respiratory Failure, resolved   Sinus Tachycardia, resolved   Bilateral Pulmonary Emboli  DVT L LE  Presents with one day of shortness of breath. Moderate to large burden of pulmonary emboli on admission. No CT evidence of right heart strain. Requiring O2 via nasal cannula at 2 lpm to maintain sats >90. Tachycardic to 120. This is her second blood clot. Had a DVT years ago and no longer on anti-coagulation. Takes full strength aspirin daily but unclear indication. Her previous DVT was unprovoked. This PE is also unprovoked. Hemodynamically stable for admission to med telemetry floor. Plan as follows.  - TTE: See note, normal LV function, EF 60 to 65%, RV mild dilatation with decreased systolic function increase systolic pressure  -bilateral lower extremity US L LE DVT  - Heparin gtt  -COVID testing negative this hospitalization, patient states past COVID-positive summer 2023  - Pharmacy liaison consult for DOAC coverage, last treatment for DVT patient preferred DOAC versus Coumadin.  -Discussed with patient Vascular medicine referral on discharge and may need prolonged anti-coagulation   -3/5/2024 IR consult, see note, spoke with IR no plans at this time for embolectomy/IVC filter however continue to monitor patient, for now we will keep on heparin drip before transition DUAC.    -3/6/2024, now on room air, no further chest pain no tachycardia, transition off heparin drip to apixaban.  CBC in AM.  Nursing notes on telemetry appearance of ST elevation, twelve-lead EKG without acute changes, as above patient without chest pain, continue to monitor on telemetry.     Hypertension   -BMP on admission normal   - PTA spironolactone 25 mg daily  - PTA amlodipine 5 mg daily  - losartan 100 mg daily   Monitor BP, adjust  antihypertensive accordingly.     Hyperlipidemia  - Continue PTA pravastatin 40 mg daily     GERD  - Continue PTA pantoprazole 40 mg daily'     Type II DM  - Hold PTA glipizide  - Sliding scale insulin - update, patient refusing glucose checks and insulin   - Update A1c = 6.8  -Modified CHO diet.       DVT Prophylaxis: On heparin drip  Code Status: Full Code     Expected Discharge Date: 03/07/2024           therapies recommending home with home OT/PT.    Nakia Lopez MD  Text Page (7am - 6pm, M-F)    I spent 35 minutes total time in management of care today 3/6/2024 reviewing labs, medications, interdisciplinary notes; and completing documentation of encounter and orders with Care Management including counseling/discussion withthe Patient regarding VTE and Vascular Med OP follow-up; and Coordinating Care and plan with Nursing re: telemtry and Specialists, IR regarding  management and surveillance; as above     Interval History   No further dyspnea, no O2 desat on room air.  Ambulatory.  No chest pain.  No tachycardia.  Discussed transition to apixaban today off heparin drip, as outpatient follow-up with vascular medicine regarding hypercoagulable state and duration of AC.     SH: No tobacco.  PTA lived independently in [facility], exercises 3 times per week        ROS: Complete ROS negative except as above.     -Data reviewed today: I reviewed all new labs and imaging results over the last 24 hours.   Temp: 97.7  F (36.5  C) Temp src: Oral BP: (!) 146/80 Pulse: 86   Resp: 18 SpO2: 95 % O2 Device: None (Room air) Oxygen Delivery: 2 LPM  Vitals:    03/04/24 1500   Weight: 83.9 kg (185 lb)     General/Constitutional: NAD, alert, calm, cooperative, appears stronger  Chest/Respiratory: Respirations nonlabored on room air  Cardiovascular:  regular, no murmur appreciated.   Gastrointestinal/Abdomen:  soft, nontender,  Neuro.  Gross motor tested, nonfocal,  Psych oriented, affect calm.    Medications    - MEDICATION  INSTRUCTIONS -        amLODIPine  5 mg Oral Daily    apixaban ANTICOAGULANT  10 mg Oral BID    [START ON 3/14/2024] apixaban ANTICOAGULANT  5 mg Oral BID    losartan  100 mg Oral Daily    multivitamin w/minerals  1 tablet Oral Daily    pantoprazole  40 mg Oral Daily    pravastatin  40 mg Oral QPM    sodium chloride (PF)  3 mL Intracatheter Q8H    spironolactone  25 mg Oral Daily    Vitamin D3  1,000 Units Oral Daily       Data   Recent Labs   Lab 03/06/24  0802 03/05/24  0942 03/05/24  0527 03/04/24  1828 03/04/24  1422   WBC 6.7  --  6.3 8.3 7.2   HGB 13.5  --  13.3 13.9 14.2   MCV 90  --  90 89 88     --  169 174 174   NA  --   --  143  --  144   POTASSIUM  --   --  4.2  --  4.5   CHLORIDE  --   --  107  --  107   CO2  --   --  23  --  24   BUN  --   --  19.8  --  19.6   CR  --   --  0.86  --  0.88   ANIONGAP  --   --  13  --  13   ROSE  --   --  10.5*  --  11.2*   GLC  --  141* 155*  --  127*       No results found for this or any previous visit (from the past 24 hour(s)).

## 2024-03-06 NOTE — PROGRESS NOTES
"   03/06/24 0800   Appointment Info   Signing Clinician's Name / Credentials (OT) Albertina Shaw, OTR/L   Living Environment   People in Home alone   Current Living Arrangements house   Home Accessibility stairs to enter home   Number of Stairs, Main Entrance 2   Stair Railings, Main Entrance none   Transportation Anticipated family or friend will provide   Living Environment Comments Pt lives in a house alone. Stairs to enter. Pt reports a family member will pick pt up upon discharge and provide assist as needed. walk in shower, tall toilet.   Self-Care   Usual Activity Tolerance good   Current Activity Tolerance moderate   Regular Exercise No   Equipment Currently Used at Home none   Fall history within last six months no   Activity/Exercise/Self-Care Comment Pt reports being IND at baseline with all ADLs. Pt ambulates w/o an AD at baseline but has a FWW or SEC if needed. Pt drives.   Instrumental Activities of Daily Living (IADL)   Previous Responsibilities driving;medication management;meal prep;housekeeping;laundry;shopping;finances   General Information   Onset of Illness/Injury or Date of Surgery 03/04/24   Referring Physician Jose Carlos Spivey, DO   Patient/Family Therapy Goal Statement (OT) To go home   Additional Occupational Profile Info/Pertinent History of Current Problem Per chart: \"Klele Abdalla is a 83 year old female admitted on 3/4/2024 for bilateral pulmonary emboli.\" As of 3/5/24 -\"IMPRESSION:  1.  RIGHT: No deep vein thrombosis.  2.  LEFT: Acute deep vein thrombosis involving the common femoral, profunda femoral, popliteal, posterior tibial and peroneal veins as described above. Duplicated femoral vein. Interventional radiology consultation is suggested\"   Existing Precautions/Restrictions fall   General Observations and Info Mild [place]   Cognitive Status Examination   Orientation Status orientation to person, place and time   Affect/Mental Status (Cognitive) WFL   Follows Commands follows " one-step commands;over 90% accuracy   Cognitive Status Comments Pt able to answer 3 of 3 higher level thinking questions appropriately without hesitation   Visual Perception   Visual Impairment/Limitations WFL;corrective lenses full-time   Sensory   Sensory Comments Pt does not report numbness/tingling   Pain Assessment   Patient Currently in Pain No   Range of Motion Comprehensive   Comment, General Range of Motion BUEs WFL   Strength Comprehensive (MMT)   Comment, General Manual Muscle Testing (MMT) Assessment BUEs WFL   Coordination   Upper Extremity Coordination No deficits were identified   Bed Mobility   Comment (Bed Mobility) supervision A   Transfers   Transfers sit-stand transfer;bed-chair transfer   Transfer Skill: Bed to Chair/Chair to Bed   Transfer Comments Mod I   Sit-Stand Transfer   Sit/Stand Transfer Comments Mod I   Balance   Balance Comments No overt LOB noted   Activities of Daily Living   BADL Assessment/Intervention lower body dressing;toileting   Lower Body Dressing Assessment/Training   Comment, (Lower Body Dressing) SBA per clinical judgement   Toileting   Comment, (Toileting) SBA per clinical judgement   Clinical Impression   Criteria for Skilled Therapeutic Interventions Met (OT) Yes, treatment indicated   OT Diagnosis Decreased ind with I/ADLs   OT Problem List-Impairments impacting ADL problems related to;activity tolerance impaired   Assessment of Occupational Performance 1-3 Performance Deficits   Identified Performance Deficits taxing IADLs   Planned Therapy Interventions (OT) ADL retraining;IADL retraining;transfer training   Clinical Decision Making Complexity (OT) problem focused assessment/low complexity   Risk & Benefits of therapy have been explained patient   OT Total Evaluation Time   OT Eval, Low Complexity Minutes (45330) 8   OT Goals   Therapy Frequency (OT) Daily   OT Predicted Duration/Target Date for Goal Attainment 03/08/24   OT Goals Hygiene/Grooming;Lower Body  Dressing;Toilet Transfer/Toileting;OT Goal 1   OT: Hygiene/Grooming modified independent;while standing  (FWW)   OT: Lower Body Dressing Modified independent  (FWW)   OT: Toilet Transfer/Toileting Modified independent;toilet transfer;cleaning and garment management  (FWW)   OT: Goal 1 Pt will verbalize at least 3 EC strategies for increased safety/ind with I/ADLs.   Therapeutic Activities   Therapeutic Activity Minutes (16731) 8   Symptoms noted during/after treatment shortness of breath   Treatment Detail/Skilled Intervention Pt greeted in chair, agreeable to OT. Pt O x 4, follows commands appropriately throughout session. Pt on RA, SpO2 reading 92%; Pt placed on 1L O2 via NC for session, pt with light activity, SpO2 reading 89% on 1L O2 via NC, pt on 1L O2 via NC at end of session. Pt stands from chair with Mod I, use of IV pole to stabilize self. Pt demos pivot from chair back to EOB with Mod I, use of IV pole for stability. Pt sits EOB with Mod I, no LOB, safe technique. Pt lays in bed with supervision A, assist with lines - pt able to scoot up in bed ind. Pt supine in bed with needs met, pt pass off for EKG. Supine in bed with needs met, alarm set, items in reach.   OT Discharge Planning   OT Plan Likely 1 more session then d/c - toileting, dressing, EC and home safety discussion   OT Discharge Recommendation (DC Rec) home with assist;home with home care occupational therapy   OT Rationale for DC Rec Pt functioning below baseline d/t decreased activity brent/mild O2 needs impacting I/ADLs. Pt resides alone and ind with I/ADLs - pt with family who can assist as needed with I/ADLs. Pt currently SBA-Mod I with light activity in room/self cares. Anticipate with continued IP OT pt will progress for d/c home with initial A with taxing IADLs, [facility] OT for home safety evaluation and activity tolerance vs OP OT to address activity brent with IADLs, once pt is medically ready.   OT Brief overview of current status See above    Total Session Time   Timed Code Treatment Minutes 8   Total Session Time (sum of timed and untimed services) 16

## 2024-03-06 NOTE — PROVIDER NOTIFICATION
Ambulated patient to bathroom and to chair. Then read this patient's morning telemetry. Patient noted to be in NSR with 0.25 mV ST elevation. Ordered conditional 12 Lead EKG and notified Dr. Lopez.     Addendum:   EKG completed and MD reviewed. No acute changes noted at this time. Continue telemetry

## 2024-03-06 NOTE — PLAN OF CARE
DATE & TIME: 3/6/21 2673-9218  Cognitive Concerns/ Orientation : A&Ox4, calm and cooperative, [place] - has bilateral hearing aides. Refusing CPAP at night   BEHAVIOR & AGGRESSION TOOL COLOR: Green  ABNL VS/O2: VSS. Weaned off of oxygen at rest, but needing 1-2 L with activity. [place].   MOBILITY: SBA with gb/w   PAIN MANAGMENT: denies  DIET: MOD carb  BOWEL/BLADDER: continent of B/B - up to bathroom, dribbles at times per patient report.  ABNL LAB/BG: Calcium 10.5   DRAIN/DEVICES: L AC IVSL   TELEMETRY RHYTHM: NSR with some ST elevation. MD aware.   SKIN: intact, scattered bruises, redness on coccyx d/t psoriasis   TESTS/PROCEDURES: Transitioned to PO eliquis today   D/C DAY/GOALS/PLACE: Likely tomorrow.   OTHER IMPORTANT INFO: May need long term anticoagulation as this is her second thrombolytic event.

## 2024-03-06 NOTE — PLAN OF CARE
Goal Outcome Evaluation:      Plan of Care Reviewed With: patient    Overall Patient Progress: no changeOverall Patient Progress: no change     Summary:  admitted from ED for SOB, found to have bilateral PEs, LLE DVT.   DATE & TIME: 03/05-03/06 1930-0730   Cognitive Concerns/ Orientation : A&Ox4, calm and cooperative, [place] - has bilateral hearing aides. Refusing CPAP at night   BEHAVIOR & AGGRESSION TOOL COLOR: Green  ABNL VS/O2: VSS on 1L NC at rest- weaned down from 2- tachy at times. Needs O2 during activities- desats down into the high 70's- 80's. Bounces back into the 90's on with 2L/NC. [place]-improving.   MOBILITY: SBA w/ GB. Walker needed for longer distances- Ambulated in hallway x1.   PAIN MANAGMENT: denies  DIET: Mod cho    BOWEL/BLADDER: continent of B/B- up to bathroom, dribbles at times per patient report. BM x1.   ABNL LAB/BG: hep 10a .51 2nd consecutive within range. Next hep 10A recheck in AM.   DRAIN/DEVICES: L AC PIV infusing heparin @ 1000u/hr.   TELEMETRY RHYTHM: SR  SKIN: intact, scattered bruises, redness on coccyx d/t psoriasis   TESTS/PROCEDURES: TTE done.   D/C DAY/GOALS/PLACE: TBD  OTHER IMPORTANT INFO: LS diminished, IR stand point no thrombectomy needed. If no acute events occur overnight can switch to DOAC tomorrow. IF decompensates overnight contact IR and make NPO. May need long term anticoagulation as this is her second thrombolytic event.

## 2024-03-07 ENCOUNTER — TELEPHONE (OUTPATIENT)
Dept: OTHER | Facility: CLINIC | Age: 84
End: 2024-03-07
Payer: COMMERCIAL

## 2024-03-07 ENCOUNTER — APPOINTMENT (OUTPATIENT)
Dept: PHYSICAL THERAPY | Facility: CLINIC | Age: 84
DRG: 175 | End: 2024-03-07
Payer: COMMERCIAL

## 2024-03-07 VITALS
WEIGHT: 185 LBS | HEIGHT: 65 IN | BODY MASS INDEX: 30.82 KG/M2 | SYSTOLIC BLOOD PRESSURE: 138 MMHG | HEART RATE: 96 BPM | RESPIRATION RATE: 18 BRPM | TEMPERATURE: 97.4 F | DIASTOLIC BLOOD PRESSURE: 65 MMHG | OXYGEN SATURATION: 95 %

## 2024-03-07 DIAGNOSIS — I26.99 PULMONARY EMBOLISM (H): Primary | ICD-10-CM

## 2024-03-07 LAB
ERYTHROCYTE [DISTWIDTH] IN BLOOD BY AUTOMATED COUNT: 13.2 % (ref 10–15)
HCT VFR BLD AUTO: 39.5 % (ref 35–47)
HGB BLD-MCNC: 13.2 G/DL (ref 11.7–15.7)
MCH RBC QN AUTO: 29.9 PG (ref 26.5–33)
MCHC RBC AUTO-ENTMCNC: 33.4 G/DL (ref 31.5–36.5)
MCV RBC AUTO: 90 FL (ref 78–100)
PLATELET # BLD AUTO: 166 10E3/UL (ref 150–450)
RBC # BLD AUTO: 4.41 10E6/UL (ref 3.8–5.2)
WBC # BLD AUTO: 6.1 10E3/UL (ref 4–11)

## 2024-03-07 PROCEDURE — 99239 HOSP IP/OBS DSCHRG MGMT >30: CPT | Performed by: HOSPITALIST

## 2024-03-07 PROCEDURE — 97116 GAIT TRAINING THERAPY: CPT | Mod: GP

## 2024-03-07 PROCEDURE — 85027 COMPLETE CBC AUTOMATED: CPT | Performed by: HOSPITALIST

## 2024-03-07 PROCEDURE — 250N000013 HC RX MED GY IP 250 OP 250 PS 637: Performed by: STUDENT IN AN ORGANIZED HEALTH CARE EDUCATION/TRAINING PROGRAM

## 2024-03-07 PROCEDURE — 250N000013 HC RX MED GY IP 250 OP 250 PS 637: Performed by: HOSPITALIST

## 2024-03-07 PROCEDURE — 97530 THERAPEUTIC ACTIVITIES: CPT | Mod: GP

## 2024-03-07 PROCEDURE — 36415 COLL VENOUS BLD VENIPUNCTURE: CPT | Performed by: HOSPITALIST

## 2024-03-07 RX ADMIN — AMLODIPINE BESYLATE 5 MG: 5 TABLET ORAL at 09:07

## 2024-03-07 RX ADMIN — SPIRONOLACTONE 25 MG: 25 TABLET ORAL at 09:07

## 2024-03-07 RX ADMIN — APIXABAN 10 MG: 5 TABLET, FILM COATED ORAL at 09:07

## 2024-03-07 RX ADMIN — PANTOPRAZOLE SODIUM 40 MG: 40 TABLET, DELAYED RELEASE ORAL at 09:07

## 2024-03-07 RX ADMIN — LOSARTAN POTASSIUM 100 MG: 100 TABLET, FILM COATED ORAL at 09:07

## 2024-03-07 RX ADMIN — Medication 1 TABLET: at 09:07

## 2024-03-07 RX ADMIN — Medication 1000 UNITS: at 09:07

## 2024-03-07 ASSESSMENT — ACTIVITIES OF DAILY LIVING (ADL)
ADLS_ACUITY_SCORE: 29

## 2024-03-07 NOTE — PLAN OF CARE
Summary: Admitted from ED for SOB, found to have bilateral PEs, LLE DVT.   DATE & TIME: 3/6/24, 3626 - 4783  Cognitive Concerns/ Orientation : A&Ox4, calm and cooperative, [place],  has bilateral hearing aides. Refusing CPAP at night   BEHAVIOR & AGGRESSION TOOL COLOR: Green  ABNL VS/O2: VSS on room air   MOBILITY: SBA with gait belt. Gait steady  PAIN MANAGMENT: Denies  DIET: MOD carb.   BOWEL/BLADDER: continent of B/B with some dribbles at times.  ABNL LAB/BG: AM labs pending   DRAIN/DEVICES: PIV SL  TELEMETRY RHYTHM: NSR   SKIN: Intact, scattered bruises, redness on buttocks d/t psoriasis   TESTS/PROCEDURES: NA  D/C DAY/GOALS/PLACE: Likely today.   OTHER IMPORTANT INFO: May need long term anticoagulation as this is her second thrombolytic event.     Goal Outcome Evaluation:      Plan of Care Reviewed With: patient    Overall Patient Progress: improvingOverall Patient Progress: improving

## 2024-03-07 NOTE — PROGRESS NOTES
Care Management Discharge Note    Discharge Date: 03/07/2024       Discharge Disposition:      Discharge Services:      Discharge DME:      Discharge Transportation: family or friend will provide    Private pay costs discussed: Not applicable    Does the patient's insurance plan have a 3 day qualifying hospital stay waiver?  No    PAS Confirmation Code:    Patient/family educated on Medicare website which has current facility and service quality ratings:      Education Provided on the Discharge Plan:    Persons Notified of Discharge Plans: bedside nurse, patient  Patient/Family in Agreement with the Plan:      Handoff Referral Completed: No    Additional Information:  No formal consult done; patient needing home care and PCP appointment.    PCP appointment was made with her [facility] for Thursday March 14 at 9:40 am with provider Ynika Zurita.  CBC to be drawn at this visit.  Her primary physician Dr. Booth was not available; patient disappointed and stated she may attempt to change; was reminded her she needs to be seen next week with the lab draw.    Hospitalist requested home care rather than outpatient.  Home care referral sent to [facility] hub for PT/OT.  They are outsourcing and an accepting agency is not yet known.  Referral is on the AVS.    Taisha Goodwin RN  Inpatient Float Care Coordinator  [facility]

## 2024-03-07 NOTE — TELEPHONE ENCOUNTER
"Referral received via Workqueue on 3/7/24.    Pt referred to [facility] by Nakia Lopez MD  for DVT and bilateral pulmonary emboli  \"bilateral PE/DVT: plz evaluate for hyperCoag alanis and rec duration of AC\"    Routing to scheduling to coordinate the following:  D-dimer  NEW VASCULAR PATIENT consult with Vascular Medicine  Please schedule this at next available    Appt note:  Pt referred to [facility] by Nakia Lopez MD  for DVT/PE  \"bilateral PE/DVT: plz evaluate for hyperCoag alanis and rec duration of AC\" BLE venous US and CT chest in EPIC.    Pat RUVALCABA, RN    [facility]  Office: [phone]  Fax: [phone]          "

## 2024-03-07 NOTE — PLAN OF CARE
Goal Outcome Evaluation:  Summary: Admitted from ED for SOB, found to have bilateral PEs, LLE DVT.   DATE & TIME: 3/6/24 7600-0163  Cognitive Concerns/ Orientation : A&Ox4, calm and cooperative, [place] - has bilateral hearing aides. Refusing CPAP at night   BEHAVIOR & AGGRESSION TOOL COLOR: Green  ABNL VS/O2: VSS on RA. Weaned off of oxygen. Ambulated with PT and did not need oxygen.   MOBILITY: SBA with gb/w   PAIN MANAGMENT: denies  DIET: MOD carb. No blood sugar checks.   BOWEL/BLADDER: continent of B/B - up to bathroom, dribbles at times.  ABNL LAB/BG: No new labs this shift.   DRAIN/DEVICES: L PIV SL  TELEMETRY RHYTHM: NSR with some ST elevation noticed last shift.  EKG ordered. MD aware.   SKIN: intact, scattered bruises, redness on buttocks d/t psoriasis   TESTS/PROCEDURES: Transitioned to PO eliquis today.  D/C DAY/GOALS/PLACE: Likely tomorrow.   OTHER IMPORTANT INFO: May need long term anticoagulation as this is her second thrombolytic event.

## 2024-03-07 NOTE — DISCHARGE SUMMARY
[facility]    Discharge Summary  Hospitalist    Date of Admission:  3/4/2024  Date of Discharge:  3/7/2024 11:47 AM  Discharging Provider: Nakia Lopez MD  Date of Service (when I saw the patient): 03/07/24      History of Present Illness   Kelle Abdalla is a 83 year old female admitted on 3/4/2024 for bilateral pulmonary emboli.     Hospital Course   Kelle Abdalla was admitted on 3/4/2024.  The following problems were addressed during her hospitalization:    Acute Hypoxic Respiratory Failure, resolved   Sinus Tachycardia, resolved   Bilateral Pulmonary Emboli  DVT L LE  Presents with one day of shortness of breath. Moderate to large burden of pulmonary emboli on admission. No CT evidence of right heart strain. Requiring O2 via nasal cannula at 2 lpm to maintain sats >90. Tachycardic to 120. This is her second blood clot. Had a DVT years ago and no longer on anti-coagulation. Takes full strength aspirin daily but unclear indication. Her previous DVT was unprovoked. This PE is also unprovoked. Hemodynamically stable for admission to med telemetry floor. Plan as follows.  - TTE: See note, normal LV function, EF 60 to 65%, RV mild dilatation with decreased systolic function increase systolic pressure  -bilateral lower extremity US L LE DVT  - Heparin gtt  -COVID testing negative this hospitalization, patient states past COVID-positive summer 2023  - Pharmacy liaison consult for DOAC coverage, last treatment for DVT patient preferred DOAC versus Coumadin.  -Discussed with patient Vascular medicine referral on discharge and may need prolonged anti-coagulation.  She has follow-up with PCP regarding appropriate cancer screening as a risk factor for VTE.  -3/5/2024 IR consult, see note, spoke with IR no plans at this time for embolectomy/IVC filter    -3/6/2024, now on room air, no further chest pain no tachycardia, transition off heparin drip to apixaban.  CBC in AM  stable..  Nursing notes on telemetry appearance of ST elevation, twelve-lead EKG without acute changes, as above patient without chest pain, continue to monitor on telemetry.  -3/7/2024 remains off O2, no chest pain, dyspnea, on apixaban, CBC stable.  Discharge today to home, PT OT recommends home health care PT OT arranged; follow-up PCP on discharge including a CBC and appropriate up-to-date cancer screenings as risk factor for VTE.  Also arranged referral to Vascular Medicine for assessment regarding hypercoagulable state and recommendations for duration of AC.     Hypertension   -BMP on admission normal   - PTA spironolactone 25 mg daily  - PTA amlodipine 5 mg daily  - losartan 100 mg daily   Monitor BP, adjust antihypertensive accordingly.     Hyperlipidemia  - Continue PTA pravastatin 40 mg daily     GERD  - Continue PTA pantoprazole 40 mg daily'     Type II DM  - Hold PTA glipizide  - Sliding scale insulin - update, patient refusing glucose checks and insulin   - Update A1c = 6.8  -Modified CHO diet.  -Resume PTA glipizide on discharge, follow-up PCP.       Pending Results   These results will be followed up by Hospitalist Service   Unresulted Labs Ordered in the Past 30 Days of this Admission       Date and Time Order Name Status Description    3/4/2024  2:10 PM Blood Culture Peripheral Blood Preliminary     3/4/2024  2:10 PM Blood Culture Arm, Right Preliminary             Code Status   Full Code       Primary Care Physician   Margoth Booth    Physical Exam   Temp: 97.4  F (36.3  C) Temp src: Oral BP: 138/65 Pulse: 96   Resp: 18 SpO2: 95 % O2 Device: None (Room air)    Vitals:    03/04/24 1500   Weight: 83.9 kg (185 lb)     General/Constitutional:   NAD, alert, calm, cooperative    Chest/Respiratory: Respirations nonlabored on room air  Cardiovascular:  regular, no murmur appreciated.   Gastrointestinal/Abdomen:  soft, nontender,  Neuro.  Gross motor tested, nonfocal,  Psych oriented, affect calm      Discharge Disposition   Discharged to home with Home Health Care  Condition at discharge: Fair    Consultations This Hospital Stay   PHARMACY IP CONSULT  PHYSICAL THERAPY ADULT IP CONSULT  OCCUPATIONAL THERAPY ADULT IP CONSULT  PHARMACY LIAISON FOR MEDICATION COVERAGE CONSULT  INTERVENTIONAL RADIOLOGY ADULT/PEDS IP CONSULT    Time Spent on this Encounter   Nakia FRITZ MD, personally saw the patient today and spent greater than 30 minutes discharging this patient discussing discharge plans with Patient, Nursing and Care coordinator, coordinating with Specialties, IR regarding discharge recommendations, completing discharge orders, medications and follow-up    Discharge Orders      Physical Therapy  Referral      Home Care Referral      Vascular Medicine Referral      Reason for your hospital stay    Presented with pulmonary emboli [lung blood clots]     Follow-up and recommended labs and tests     Follow up with primary care provider, Margoth Booth, within 7 days for hospital follow- up.  The following labs/tests are recommended: CBC    - this appointment was scheduled at the [facility] with provider Yinka Zurita on Thursday March 14 at 9:40 am.    .    Follow up with Vascular Medicine;  appointment to be made; call Primary Provider if questions.     Activity    Your activity upon discharge: activity as tolerated and  Activities per OT/PT recommendations     Diet    Follow this diet upon discharge:       Moderate Consistent Carb (60 g CHO per Meal) Diabetic Diet     Discharge Medications   Discharge Medication List as of 3/7/2024  9:57 AM        START taking these medications    Details   !! apixaban ANTICOAGULANT (ELIQUIS) 5 MG tablet Take 2 tablets (10 mg) by mouth 2 times daily for 6 days Take first dose evening 3/7/24 until bottle done THEN start 5mg twice a day on 3/14/24, Disp-24 tablet, R-0, E-Prescribe      !! apixaban ANTICOAGULANT (ELIQUIS) 5 MG tablet Take 1  tablet (5 mg) by mouth 2 times daily START on 3/14/24 AFTER completion of 10mg twice a day., Disp-60 tablet, R-0, E-Prescribe       !! - Potential duplicate medications found. Please discuss with provider.        CONTINUE these medications which have NOT CHANGED    Details   alendronate (FOSAMAX) 70 MG tablet Take 70 mg by mouth every 7 days On sundays, Historical      AmLODIPine Besylate (NORVASC PO) Take 5 mg by mouth daily, Historical      amoxicillin (AMOXIL) 500 MG capsule Take 2,000 mg by mouth 1 hour Prior to dental appointment, Historical      ciclopirox (LOPROX) 0.77 % cream Apply topically at bedtimeHistorical      fesoterodine fumarate (TOVIAZ) 4 MG TB24 24 hr tablet Take 4 mg by mouth daily, Historical      fish oil-omega-3 fatty acids 500 MG capsule Take 500 mg by mouth 2 times daily, Historical      glipiZIDE (GLUCOTROL XL) 5 MG 24 hr tablet Take 5 mg by mouth daily, Historical      losartan (COZAAR) 100 MG tablet Take 100 mg by mouth daily, Historical      methylcellulose (CITRUCEL) powder Take 1 teaspoonful by mouth 2 times daily, Historical      multivitamin (CENTRUM SILVER) tablet Take 1 tablet by mouth daily, Historical      omeprazole (PRILOSEC) 20 MG DR capsule Take 20 mg by mouth daily, Historical      pravastatin (PRAVACHOL) 40 MG tablet Take 40 mg by mouth every evening, Historical      spironolactone (ALDACTONE) 25 MG tablet Take 25 mg by mouth daily, Historical      triamcinolone (KENALOG) 0.1 % cream Apply topically 3 times daily as needed for irritationHistorical      Vitamin D (Cholecalciferol) 25 MCG (1000 UT) CAPS Take 1,000 Units by mouth daily, Historical           STOP taking these medications       aspirin (ASA) 81 MG chewable tablet Comments:   Reason for Stopping:             Allergies   Allergies   Allergen Reactions    Atorvastatin GI Disturbance    Ibuprofen Unknown    Pollen Extracts [Pollen Extract]      Data   Most Recent 3 CBC's:  Recent Labs   Lab Test 03/07/24  0812  03/06/24  0802 03/05/24  0527   WBC 6.1 6.7 6.3   HGB 13.2 13.5 13.3   MCV 90 90 90    164 169      Most Recent 3 BMP's:  Recent Labs   Lab Test 03/05/24  0942 03/05/24  0527 03/04/24  1422 09/30/19  1601   NA  --  143 144 143   POTASSIUM  --  4.2 4.5 3.7   CHLORIDE  --  107 107 109   CO2  --  23 24 28   BUN  --  19.8 19.6 17   CR  --  0.86 0.88 1.13*   ANIONGAP  --  13 13 6   ROSE  --  10.5* 11.2* 10.4*   * 155* 127* 123*     Most Recent 2 LFT's:  Recent Labs   Lab Test 09/30/19  1601 07/25/19  0723   AST 31 32   ALT 56* 36   ALKPHOS 60 68   BILITOTAL 0.3 0.4     Most Recent INR's and Anticoagulation Dosing History:  Anticoagulation Dose History          Latest Ref Rng & Units 7/23/2019   Recent Dosing and Labs   INR 0.86 - 1.14 0.98      Most Recent 3 Troponin's:  Recent Labs   Lab Test 09/30/19  1601 07/24/19  0717 07/23/19  2226   TROPI <0.015 0.258* 0.408*     Most Recent Cholesterol Panel:No lab results found.  Most Recent 6 Bacteria Isolates From Any Culture (See EPIC Reports for Culture Details):No lab results found.  Most Recent TSH, T4 and A1c Labs:  Recent Labs   Lab Test 03/04/24  1828   A1C 6.8*

## 2024-03-07 NOTE — PLAN OF CARE
Date & Time: 3/7 6105-5525  Diagnosis: Acute respiratory insufficiency  Procedures: NA  Orientation/Cognitive: AOx4  VS/O2: VSS, RA  Mobility: SBA + walker  Diet: Mod CHO  Pain Management: Denies - PRNs available  Neuro: Intact  Bowel & Bladder: Urinary frequency  Skin: Scattered bruises  Abnormal Labs: A1c 6.8, trop 20  Tele: ST  IV Access/Drips/Fluids: L PIV SL  Drains: NA  Tests/Imaging: Chest CT - PE bilateral, US - DVT LLE, ECHO - EF 60-65%  Consults: Hospitalist  Discharge Plan: Home 3/7    Discharge instructions & safety reviewed, all questions answered. Discharge medications given to patient and reviewed, all questions answered. Patient discharged home with friend with all belongings & medications.

## 2024-03-07 NOTE — PLAN OF CARE
Occupational Therapy Discharge Summary    Reason for therapy discharge:    Discharged to home with home therapy.    Progress towards therapy goal(s). See goals on Care Plan in [facility] electronic health record for goal details.  Goals partially met.  Barriers to achieving goals:   discharge from facility.    Therapy recommendation(s):    Continued therapy is recommended.  Rationale/Recommendations:  Pt functioning below baseline d/t decreased activity brent/mild O2 needs impacting I/ADLs. Pt resides alone and ind with I/ADLs - pt with family who can assist as needed with I/ADLs. Pt currently SBA-Mod I with light activity in room/self cares. Anticipate with continued IP OT pt will progress for d/c home with initial A with taxing IADLs, [facility] OT for home safety evaluation and activity tolerance vs OP OT to address activity brent with IADLs, once pt is medically ready.

## 2024-03-08 LAB
ATRIAL RATE - MUSE: 84 BPM
DIASTOLIC BLOOD PRESSURE - MUSE: NORMAL MMHG
INTERPRETATION ECG - MUSE: NORMAL
P AXIS - MUSE: 69 DEGREES
PR INTERVAL - MUSE: 164 MS
QRS DURATION - MUSE: 96 MS
QT - MUSE: 360 MS
QTC - MUSE: 425 MS
R AXIS - MUSE: -4 DEGREES
SYSTOLIC BLOOD PRESSURE - MUSE: NORMAL MMHG
T AXIS - MUSE: 73 DEGREES
VENTRICULAR RATE- MUSE: 84 BPM

## 2024-03-08 NOTE — TELEPHONE ENCOUNTER
Future Appointments   Date Time Provider Department Center   3/21/2024 10:00 AM Celia Chan MD [facility]

## 2024-03-08 NOTE — TELEPHONE ENCOUNTER
Spoke with patient.  She is cancelling her appointment as  [facility] is out of network for her.  She will schedule with Mitch.    I also left a message for the patient that she could follow up with her primary care provider for a recommendation on who to see in her network.

## 2024-03-08 NOTE — PLAN OF CARE
"Physical Therapy Discharge Summary    Reason for therapy discharge:    Discharged to home with outpatient therapy.    Progress towards therapy goal(s). See goals on Care Plan in [facility] electronic health record for goal details.  Goals partially met.  Barriers to achieving goals:   discharge from facility.    Therapy recommendation(s):    Continued therapy is recommended.  Rationale/Recommendations:  Pt is below baseline but is moving well. Pt currently requiring SBA for all functional mobility. Anticipate with further medical management and IP PT pt will progress to safely return home at discharge. Pt has access to assist as needed. Recommend pt use a FWW at discharge for improved balance. Pt already owns FWW. Recommend follow up with OP PT for gait and balance deficits. Pt states she is already doing therapy at [facility] but for her \"bladder\". She is interested in balance program at [facility] as well but was thinking to wait until after her current therapy is complete. Order placed..      "

## 2024-03-09 ENCOUNTER — PATIENT OUTREACH (OUTPATIENT)
Dept: CARE COORDINATION | Facility: CLINIC | Age: 84
End: 2024-03-09
Payer: COMMERCIAL

## 2024-03-09 LAB
BACTERIA BLD CULT: NO GROWTH
BACTERIA BLD CULT: NO GROWTH

## 2024-03-09 NOTE — PROGRESS NOTES
"Clinic Care Coordination Contact  [facility]: Post-Discharge Note  SITUATION                                                      Admission:    Admission Date: 03/04/24   Reason for Admission: Shortness of breath  Discharge:   Discharge Date: 03/07/24  Discharge Diagnosis: Acute respiratory insufficiency, Bilateral pulmonary embolism (H)    BACKGROUND                                                      Per hospital discharge summary and inpatient provider notes:    Kelle Abdalla is a 83 year old female, with a history of type II diabetes, hypertension, DVT, PE, and hyperlipidemia who presents to the ED with her friend for evaluation of shortness of breath and tachycardia. Patient states that while walking into the [facility] for a stretch class this morning, she had a hard time breathing and talking. She proceeded with stretch class and was able to participate for the entirety of the class and speak during with no problems. On the way back out to her car, she could not walk and was having trouble breathing so she sat down. She started to feel better after doing so but due to the persistence, she decided to come in for evaluation. States she has experienced these symptoms before but it has never persisted for so long. Patient reports she has allergies and had a stuffy nose this morning but says it went away quickly. Also notes she had a blot clot approximately five years ago but is unsure of the details. Denies tachycardia or palpitations, leg swelling, and recent travel.     ASSESSMENT      Discharge Assessment  How are you doing now that you are home?: \"Yes I am doing great and I am getting back to all of my daily activites.\"  How are your symptoms? (Red Flag symptoms escalate to triage hotline per guidelines): Improved  Do you feel your condition is stable enough to be safe at home until your provider visit?: Yes  Does the patient have their discharge instructions? : Yes  Does the " patient have questions regarding their discharge instructions? : No  Were you started on any new medications or were there changes to any of your previous medications? : Yes  Does the patient have all of their medications?: Yes  Do you have questions regarding any of your medications? : No  Do you have all of your needed medical supplies or equipment (DME)?  (i.e. oxygen tank, CPAP, cane, etc.): Yes  Discharge follow-up appointment scheduled within 14 calendar days? : Yes  Discharge Follow Up Appointment Date: 03/13/24  Discharge Follow Up Appointment Scheduled with?: Primary Care Provider    Post-op ([facility] CTA Only)  If the patient had a surgery or procedure, do they have any questions for a nurse?: No    PLAN                                                      Outpatient Plan:      Follow-up and recommended labs and tests  Follow up with primary care provider, Margoth Booth, within 7 days for hospital follow- up. The following labs/tests are recommended: CBC  - this appointment was scheduled at the [facility] with provider Yinka Zurita on Thursday March 14 at 9:40 am.  .  Follow up with Vascular Medicine; appointment to be made; call Primary Provider if questions    No future appointments.      For any urgent concerns, please contact our 24 hour nurse triage line: [phone] ([phone])         VENITA Warner  [phone]  [facility]

## 2024-05-24 ENCOUNTER — APPOINTMENT (OUTPATIENT)
Dept: URBAN - METROPOLITAN AREA CLINIC 253 | Age: 84
Setting detail: DERMATOLOGY
End: 2024-05-26

## 2024-05-24 VITALS — HEIGHT: 65 IN | WEIGHT: 180 LBS | RESPIRATION RATE: 14 BRPM

## 2024-05-24 DIAGNOSIS — B35.1 TINEA UNGUIUM: ICD-10-CM

## 2024-05-24 PROCEDURE — OTHER PRESCRIPTION: OTHER

## 2024-05-24 PROCEDURE — OTHER COUNSELING: OTHER

## 2024-05-24 PROCEDURE — 99203 OFFICE O/P NEW LOW 30 MIN: CPT

## 2024-05-24 PROCEDURE — OTHER MIPS QUALITY: OTHER

## 2024-05-24 PROCEDURE — OTHER ADDITIONAL NOTES: OTHER

## 2024-05-24 PROCEDURE — OTHER PRESCRIPTION MEDICATION MANAGEMENT: OTHER

## 2024-05-24 RX ORDER — CICLOPIROX 80 MG/ML
8% SOLUTION TOPICAL QHS
Qty: 6.6 | Refills: 5 | Status: ERX | COMMUNITY
Start: 2024-05-24

## 2024-05-24 ASSESSMENT — LOCATION DETAILED DESCRIPTION DERM
LOCATION DETAILED: LEFT DORSAL FOOT
LOCATION DETAILED: RIGHT DORSAL FOOT

## 2024-05-24 ASSESSMENT — LOCATION SIMPLE DESCRIPTION DERM
LOCATION SIMPLE: RIGHT FOOT
LOCATION SIMPLE: LEFT FOOT

## 2024-05-24 ASSESSMENT — LOCATION ZONE DERM: LOCATION ZONE: FEET

## 2024-05-24 NOTE — PROCEDURE: ADDITIONAL NOTES
Additional Notes: Informed patient that she can cut her toenails if desired. Recommended she follow up with a podiatrist if she is unable to do this herself.
Detail Level: Simple
Render Risk Assessment In Note?: no

## 2024-05-24 NOTE — PROCEDURE: PRESCRIPTION MEDICATION MANAGEMENT
Initiate Treatment: Ciclopirox 8% solution QHS (remove with nail polish remover once weekly)
Render In Strict Bullet Format?: No
Continue Regimen: Fluconazole 100mg once weekly (finish remaining Rx prescribed and monitored by PCP)
Detail Level: Zone

## 2024-05-24 NOTE — HPI: OTHER
Condition:: Nail Fungus
Please Describe Your Condition:: This condition is the worse on the left big toe. There may be some fungus on the right big toe. She states this has been present for about 4 months. \\nHer doctor told her this was not nail fungus but that it was mold. \\Paul had currently taking fluconazole, this was prescribed once weekly x 15 weeks. She has completed 7 weeks of treatment. She is not sure if this is resulting in improvement. \\Paul read about a new medication that came out a few months ago for nail fungus. She is wondering if this could be prescribed. She is unsure of the name. \\Paul gets pedicures at [facility], she thinks this may be where she got this condition.

## 2024-07-10 ENCOUNTER — HOSPITAL ENCOUNTER (EMERGENCY)
Facility: CLINIC | Age: 84
Discharge: HOME | End: 2024-07-10
Attending: STUDENT IN AN ORGANIZED HEALTH CARE EDUCATION/TRAINING PROGRAM | Admitting: STUDENT IN AN ORGANIZED HEALTH CARE EDUCATION/TRAINING PROGRAM
Payer: COMMERCIAL

## 2024-07-10 ENCOUNTER — APPOINTMENT (OUTPATIENT)
Dept: CT IMAGING | Facility: CLINIC | Age: 84
End: 2024-07-10
Attending: STUDENT IN AN ORGANIZED HEALTH CARE EDUCATION/TRAINING PROGRAM
Payer: COMMERCIAL

## 2024-07-10 VITALS
HEART RATE: 87 BPM | OXYGEN SATURATION: 97 % | TEMPERATURE: 98.3 F | BODY MASS INDEX: 30.78 KG/M2 | SYSTOLIC BLOOD PRESSURE: 137 MMHG | RESPIRATION RATE: 16 BRPM | WEIGHT: 184.75 LBS | HEIGHT: 65 IN | DIASTOLIC BLOOD PRESSURE: 76 MMHG
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
## 2024-07-10 DIAGNOSIS — S09.90XA CLOSED HEAD INJURY, INITIAL ENCOUNTER: ICD-10-CM

## 2024-07-10 PROCEDURE — 70450 CT HEAD/BRAIN W/O DYE: CPT

## 2024-07-10 PROCEDURE — 99284 EMERGENCY DEPT VISIT MOD MDM: CPT | Mod: 25

## 2024-07-10 NOTE — ED TRIAGE NOTES
Tripped and fell. Hit right sided of head and ear. Bruising noted to ear. Takes eliquis. Denies LOC.

## 2024-07-11 NOTE — ED PROVIDER NOTES
Emergency Department Note      History of Present Illness     Chief Complaint   Fall      HPI   Kelle Abdalla is a 83 year old female on Eliquis, presents after ground-level fall.  Patient uses a cane to ambulate.  She states she was hurrying and turning around and she tripped and fell and struck the right side of her head.  No LOC.  No vomiting.  She has no pain.  No neck pain.  No extremity pain.  She has no concerns.  She feels well to go home.    Independent Historian   None    Review of External Notes   Office note from today for fall.    Past Medical History     Medical History and Problem List   Past Medical History:   Diagnosis Date    Anxiety     Depression     Diabetes (H)     Gastroesophageal reflux disease     Hyperlipidemia     Hypertension     Overactive bladder     Psoriasis     Sleep apnea     Tubular adenoma        Medications   alendronate (FOSAMAX) 70 MG tablet  AmLODIPine Besylate (NORVASC PO)  amoxicillin (AMOXIL) 500 MG capsule  apixaban ANTICOAGULANT (ELIQUIS) 5 MG tablet  ciclopirox (LOPROX) 0.77 % cream  fesoterodine fumarate (TOVIAZ) 4 MG TB24 24 hr tablet  fish oil-omega-3 fatty acids 500 MG capsule  glipiZIDE (GLUCOTROL XL) 5 MG 24 hr tablet  losartan (COZAAR) 100 MG tablet  methylcellulose (CITRUCEL) powder  multivitamin (CENTRUM SILVER) tablet  omeprazole (PRILOSEC) 20 MG DR capsule  pravastatin (PRAVACHOL) 40 MG tablet  spironolactone (ALDACTONE) 25 MG tablet  triamcinolone (KENALOG) 0.1 % cream  Vitamin D (Cholecalciferol) 25 MCG (1000 UT) CAPS        Surgical History   Past Surgical History:   Procedure Laterality Date    CHOLECYSTECTOMY      DAVINCI SACROCOLPOPEXY, MIDURETHRAL SLING, CYSTOSCOPY N/A 10/19/2018    Procedure: DAVINCI SACROCOLPOPEXY CYSTOSCOPY AND MIDURETHRAL SLING (ANDREW) ;  Surgeon: Pool Aldana MD;  Location: [facility] OR    GENITOURINARY SURGERY      BLADDER    LAPAROSCOPIC HYSTERECTOMY SUPRACERVICAL, BILATERAL SALPINGO-OOPHORECTOMY, COMBINED  "Bilateral 10/19/2018    Procedure: LAPAROSCOPY, LAPAROSCOPIC SUPRACERVICAL HYSTERECTOMY BILATERAL SALPINGO--OOPHORECTOMY (AMY) DAVINCI SACROCOLPOPEXY, MIDURETHRAL SLING, CYSTOSCOPY (ANDREW);  Surgeon: Edmond Pfeiffer MD;  Location:  OR    ORTHOPEDIC SURGERY      BUNIONECTOMY    ORTHOPEDIC SURGERY       ARTHROSCOPY OF KNEE       Physical Exam     Patient Vitals for the past 24 hrs:   BP Temp Temp src Pulse Resp SpO2 Height Weight   07/10/24 1914 -- -- -- -- -- 97 % -- --   07/10/24 1730 136/82 -- -- -- -- -- -- --   07/10/24 1728 -- 98.3  F (36.8  C) Temporal 93 16 98 % 1.651 m (5' 5\") 83.8 kg (184 lb 11.9 oz)     Physical Exam  GENERAL: Patient well-appearing  HEAD: Atraumatic. No leonard sign, raccoon eyes or CSF leak  Eyes: Anicteric. PERRL  NOSE: No active bleeding  Ear: Mild bruising to the top of the right ear.  No hematoma.  MOUTH: Moist mucosa  THROAT: Patent airway. Pharynx clear.   Neck: No rigidity. No midline spinal tenderness  Back: No midline spinal tenderness, crepitus or gross deformity  CV: RRR, no murmurs, rubs or gallops  PULM: CTAB with good aeration; no retractions, rales, rhonchi, or wheezing  ABD: Soft, nontender, nondistended, no guarding, no peritoneal signs, no bruising  DERM: No rash. Skin warm and dry  EXTREMITY: Moving all extremities without difficulty  Pelvis: Stable  VASCULAR: Symmetric pulses bilaterally  NEURO: Alert, answering questions appropriately.  CN 2-12 fully intact. Strength 5/5 bilateral LE/UE. Sensation fully intact to light touch symmetrically bilateral LE/UE.        Diagnostics     Lab Results   Labs Ordered and Resulted from Time of ED Arrival to Time of ED Departure - No data to display    Imaging   CT Head w/o Contrast   Final Result   IMPRESSION: Negative for acute intracranial hemorrhage, hydrocephalus or transcortical infarct. No skull fracture.           Independent Interpretation   CT head no bleed.    ED Course      Medications Administered   Medications - " No data to display    Procedures   Procedures     Discussion of Management   None    ED Course        Optional/Additional Documentation  None    Medical Decision Making / Diagnosis        CAROL Abdalla is a 83 year old female     Symptoms most consistent with head trauma after ground-level fall.  On anticoagulation.    DDx considered intracranial bleed, skull fracture, severe TBI.    CT head negative.  Clinically cleared C-spine.  No spinal tenderness.  Ranging neck without difficulty.  Unremarkable neurologic exam.    Labs not indicated.    Patient has no acute concerns.    I have evaluated the patient for acute medical emergencies and have clinically decided no further acute medical interventions are required. Patient stable for discharge. All questions answered. Given strict return precautions. Patient content with plan. The differential diagnosis and treatment modalities were discussed thoroughly with the patient.        Disposition   The patient was discharged.     Diagnosis     ICD-10-CM    1. Fall, initial encounter  W19.XXXA       2. Closed head injury, initial encounter  S09.90XA            Discharge Medications   New Prescriptions    No medications on file         MD Sola Hebert Kevin, MD  07/10/24 0865

## 2024-08-02 ENCOUNTER — APPOINTMENT (OUTPATIENT)
Dept: GENERAL RADIOLOGY | Facility: CLINIC | Age: 84
End: 2024-08-02
Attending: EMERGENCY MEDICINE
Payer: COMMERCIAL

## 2024-08-02 ENCOUNTER — HOSPITAL ENCOUNTER (EMERGENCY)
Facility: CLINIC | Age: 84
Discharge: HOME | End: 2024-08-02
Attending: EMERGENCY MEDICINE | Admitting: EMERGENCY MEDICINE
Payer: COMMERCIAL

## 2024-08-02 VITALS
OXYGEN SATURATION: 97 % | RESPIRATION RATE: 18 BRPM | BODY MASS INDEX: 30.34 KG/M2 | TEMPERATURE: 98.1 F | HEIGHT: 65 IN | SYSTOLIC BLOOD PRESSURE: 130 MMHG | HEART RATE: 68 BPM | WEIGHT: 182.1 LBS | DIASTOLIC BLOOD PRESSURE: 66 MMHG

## 2024-08-02 DIAGNOSIS — U07.1 COVID-19 VIRUS INFECTION: ICD-10-CM

## 2024-08-02 DIAGNOSIS — M25.511 ACUTE PAIN OF RIGHT SHOULDER: ICD-10-CM

## 2024-08-02 DIAGNOSIS — Z79.01 CHRONIC ANTICOAGULATION: ICD-10-CM

## 2024-08-02 LAB
ALBUMIN SERPL BCG-MCNC: 4.2 G/DL (ref 3.5–5.2)
ALP SERPL-CCNC: 53 U/L (ref 40–150)
ALT SERPL W P-5'-P-CCNC: 57 U/L (ref 0–50)
ANION GAP SERPL CALCULATED.3IONS-SCNC: 14 MMOL/L (ref 7–15)
AST SERPL W P-5'-P-CCNC: 32 U/L (ref 0–45)
ATRIAL RATE - MUSE: 69 BPM
BASOPHILS # BLD AUTO: 0 10E3/UL (ref 0–0.2)
BASOPHILS NFR BLD AUTO: 0 %
BILIRUB SERPL-MCNC: 0.4 MG/DL
BUN SERPL-MCNC: 18.8 MG/DL (ref 8–23)
CALCIUM SERPL-MCNC: 10 MG/DL (ref 8.8–10.4)
CHLORIDE SERPL-SCNC: 102 MMOL/L (ref 98–107)
CREAT SERPL-MCNC: 0.78 MG/DL (ref 0.51–0.95)
DIASTOLIC BLOOD PRESSURE - MUSE: NORMAL MMHG
EGFRCR SERPLBLD CKD-EPI 2021: 76 ML/MIN/1.73M2
EOSINOPHIL # BLD AUTO: 0 10E3/UL (ref 0–0.7)
EOSINOPHIL NFR BLD AUTO: 1 %
ERYTHROCYTE [DISTWIDTH] IN BLOOD BY AUTOMATED COUNT: 12.7 % (ref 10–15)
GLUCOSE SERPL-MCNC: 172 MG/DL (ref 70–99)
HCO3 SERPL-SCNC: 22 MMOL/L (ref 22–29)
HCT VFR BLD AUTO: 39.9 % (ref 35–47)
HGB BLD-MCNC: 13.4 G/DL (ref 11.7–15.7)
HOLD SPECIMEN: NORMAL
HOLD SPECIMEN: NORMAL
IMM GRANULOCYTES # BLD: 0.1 10E3/UL
IMM GRANULOCYTES NFR BLD: 1 %
INTERPRETATION ECG - MUSE: NORMAL
LIPASE SERPL-CCNC: 43 U/L (ref 13–60)
LYMPHOCYTES # BLD AUTO: 2 10E3/UL (ref 0.8–5.3)
LYMPHOCYTES NFR BLD AUTO: 26 %
MAGNESIUM SERPL-MCNC: 1.8 MG/DL (ref 1.7–2.3)
MCH RBC QN AUTO: 30 PG (ref 26.5–33)
MCHC RBC AUTO-ENTMCNC: 33.6 G/DL (ref 31.5–36.5)
MCV RBC AUTO: 89 FL (ref 78–100)
MONOCYTES # BLD AUTO: 1 10E3/UL (ref 0–1.3)
MONOCYTES NFR BLD AUTO: 13 %
NEUTROPHILS # BLD AUTO: 4.4 10E3/UL (ref 1.6–8.3)
NEUTROPHILS NFR BLD AUTO: 59 %
NRBC # BLD AUTO: 0 10E3/UL
NRBC BLD AUTO-RTO: 0 /100
P AXIS - MUSE: 18 DEGREES
PLATELET # BLD AUTO: 181 10E3/UL (ref 150–450)
POTASSIUM SERPL-SCNC: 3.8 MMOL/L (ref 3.4–5.3)
PR INTERVAL - MUSE: 148 MS
PROT SERPL-MCNC: 6.9 G/DL (ref 6.4–8.3)
QRS DURATION - MUSE: 98 MS
QT - MUSE: 368 MS
QTC - MUSE: 394 MS
R AXIS - MUSE: 3 DEGREES
RBC # BLD AUTO: 4.47 10E6/UL (ref 3.8–5.2)
SODIUM SERPL-SCNC: 138 MMOL/L (ref 135–145)
SYSTOLIC BLOOD PRESSURE - MUSE: NORMAL MMHG
T AXIS - MUSE: 52 DEGREES
TROPONIN T SERPL HS-MCNC: 18 NG/L
TROPONIN T SERPL HS-MCNC: 18 NG/L
VENTRICULAR RATE- MUSE: 69 BPM
WBC # BLD AUTO: 7.5 10E3/UL (ref 4–11)

## 2024-08-02 PROCEDURE — 93005 ELECTROCARDIOGRAM TRACING: CPT

## 2024-08-02 PROCEDURE — 80053 COMPREHEN METABOLIC PANEL: CPT | Performed by: EMERGENCY MEDICINE

## 2024-08-02 PROCEDURE — 36415 COLL VENOUS BLD VENIPUNCTURE: CPT | Performed by: EMERGENCY MEDICINE

## 2024-08-02 PROCEDURE — 83690 ASSAY OF LIPASE: CPT | Performed by: EMERGENCY MEDICINE

## 2024-08-02 PROCEDURE — 83735 ASSAY OF MAGNESIUM: CPT | Performed by: EMERGENCY MEDICINE

## 2024-08-02 PROCEDURE — 99285 EMERGENCY DEPT VISIT HI MDM: CPT | Mod: 25

## 2024-08-02 PROCEDURE — 73030 X-RAY EXAM OF SHOULDER: CPT | Mod: RT

## 2024-08-02 PROCEDURE — 71046 X-RAY EXAM CHEST 2 VIEWS: CPT

## 2024-08-02 PROCEDURE — 84484 ASSAY OF TROPONIN QUANT: CPT | Performed by: EMERGENCY MEDICINE

## 2024-08-02 PROCEDURE — 85025 COMPLETE CBC W/AUTO DIFF WBC: CPT | Performed by: EMERGENCY MEDICINE

## 2024-08-02 ASSESSMENT — ACTIVITIES OF DAILY LIVING (ADL)
ADLS_ACUITY_SCORE: 38

## 2024-08-02 ASSESSMENT — COLUMBIA-SUICIDE SEVERITY RATING SCALE - C-SSRS
1. IN THE PAST MONTH, HAVE YOU WISHED YOU WERE DEAD OR WISHED YOU COULD GO TO SLEEP AND NOT WAKE UP?: NO
2. HAVE YOU ACTUALLY HAD ANY THOUGHTS OF KILLING YOURSELF IN THE PAST MONTH?: NO
6. HAVE YOU EVER DONE ANYTHING, STARTED TO DO ANYTHING, OR PREPARED TO DO ANYTHING TO END YOUR LIFE?: NO

## 2024-08-02 NOTE — ED TRIAGE NOTES
Patient reports a pain in her left shoulder that began when she woke this morning. Patient reports she has taken nothing for pain and that she sleeps on her left side at night, patient denies injury.      Triage Assessment (Adult)       Row Name 08/02/24 0917          Triage Assessment    Airway WDL WDL        Respiratory WDL    Respiratory WDL WDL        Skin Circulation/Temperature WDL    Skin Circulation/Temperature WDL WDL        Cardiac WDL    Cardiac WDL WDL        Peripheral/Neurovascular WDL    Peripheral Neurovascular WDL WDL        Cognitive/Neuro/Behavioral WDL    Cognitive/Neuro/Behavioral WDL WDL

## 2024-08-02 NOTE — ED PROVIDER NOTES
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 At about 0745 today, shortly after waking up, patient started experiencing the onset of right shoulder pain. It primarily is focused on the back of the shoulder and Kelle notes that while the pain was not sharp, it was very high and hurt regardless of movement. The pain has now completely subsided. She has never had a history of pain like this. She endorses mild heartburn in her abdominal area, and having a history of blood clots (2 in her leg in March). She denies chest pain, shortness of breath, coughing blood. She also denies a history of heart attacks, stents, or family history of heart issues.     Independent Historian   None    Review of External Notes   None    Past Medical History     Medical History and Problem List   Past Medical History:   Diagnosis Date    Anxiety     Depression     Diabetes (H)     Gastroesophageal reflux disease     Hyperlipidemia     Hypertension     Overactive bladder     Psoriasis     Sleep apnea     Tubular adenoma    Cystocele with prolapse  Asymptomatic varicose veins  Mixed incontinence  Bilateral pulmonary embolism  Constipation    Medications   alendronate (FOSAMAX) 70 MG tablet  AmLODIPine Besylate (NORVASC PO)  amoxicillin (AMOXIL) 500 MG capsule  apixaban ANTICOAGULANT (ELIQUIS) 5 MG tablet  ciclopirox (LOPROX) 0.77 % cream  fesoterodine fumarate (TOVIAZ) 4 MG TB24 24 hr tablet  fish oil-omega-3 fatty acids 500 MG capsule  glipiZIDE (GLUCOTROL XL) 5 MG 24 hr tablet  losartan (COZAAR) 100 MG tablet  methylcellulose (CITRUCEL) powder  multivitamin (CENTRUM SILVER) tablet  omeprazole (PRILOSEC) 20 MG DR capsule  pravastatin (PRAVACHOL) 40 MG tablet  spironolactone (ALDACTONE) 25 MG tablet  triamcinolone (KENALOG) 0.1 %  "cream  Vitamin D (Cholecalciferol) 25 MCG (1000 UT) CAPS        Surgical History   Past Surgical History:   Procedure Laterality Date    CHOLECYSTECTOMY      DAVINCI SACROCOLPOPEXY, MIDURETHRAL SLING, CYSTOSCOPY N/A 10/19/2018    Procedure: DAVINCI SACROCOLPOPEXY CYSTOSCOPY AND MIDURETHRAL SLING (SITGENEVA) ;  Surgeon: Pool Aldana MD;  Location:  OR    GENITOURINARY SURGERY      BLADDER    LAPAROSCOPIC HYSTERECTOMY SUPRACERVICAL, BILATERAL SALPINGO-OOPHORECTOMY, COMBINED Bilateral 10/19/2018    Procedure: LAPAROSCOPY, LAPAROSCOPIC SUPRACERVICAL HYSTERECTOMY BILATERAL SALPINGO--OOPHORECTOMY (AMY) DAVINCI SACROCOLPOPEXY, MIDURETHRAL SLING, CYSTOSCOPY (ANDREW);  Surgeon: Edmond Pfeiffer MD;  Location:  OR    ORTHOPEDIC SURGERY      BUNIONECTOMY    ORTHOPEDIC SURGERY       ARTHROSCOPY OF KNEE       Physical Exam     Patient Vitals for the past 24 hrs:   BP Temp Temp src Pulse Resp SpO2 Height Weight   08/02/24 1231 130/66 -- -- 68 18 97 % -- --   08/02/24 0919 (!) 169/73 98.1  F (36.7  C) Oral 81 18 99 % 1.651 m (5' 5\") 82.6 kg (182 lb 1.6 oz)     Physical Exam  General: Sitting on the ED wheelchair  HEENT: Normocephalic, atraumatic  Cardiac: Radial pulses 2+, regular rate and rhythm  Pulm: Breathing comfortably, no accessory muscle usage, no conversational dyspnea, and lungs clear bilaterally  GI: Abdomen soft, nontender, no rigidity or guarding  MSK: No bony deformities, trace edema BLE, no calf tenderness BLE  Skin: Warm and dry  Neuro: Moves all extremities  Psych: Pleasant mood and affect    Diagnostics     Lab Results   Labs Ordered and Resulted from Time of ED Arrival to Time of ED Departure   COMPREHENSIVE METABOLIC PANEL - Abnormal       Result Value    Sodium 138      Potassium 3.8      Carbon Dioxide (CO2) 22      Anion Gap 14      Urea Nitrogen 18.8      Creatinine 0.78      GFR Estimate 76      Calcium 10.0      Chloride 102      Glucose 172 (*)     Alkaline Phosphatase 53      AST 32      " ALT 57 (*)     Protein Total 6.9      Albumin 4.2      Bilirubin Total 0.4     TROPONIN T, HIGH SENSITIVITY - Abnormal    Troponin T, High Sensitivity 18 (*)    TROPONIN T, HIGH SENSITIVITY - Abnormal    Troponin T, High Sensitivity 18 (*)    LIPASE - Normal    Lipase 43     MAGNESIUM - Normal    Magnesium 1.8     CBC WITH PLATELETS AND DIFFERENTIAL    WBC Count 7.5      RBC Count 4.47      Hemoglobin 13.4      Hematocrit 39.9      MCV 89      MCH 30.0      MCHC 33.6      RDW 12.7      Platelet Count 181      % Neutrophils 59      % Lymphocytes 26      % Monocytes 13      % Eosinophils 1      % Basophils 0      % Immature Granulocytes 1      NRBCs per 100 WBC 0      Absolute Neutrophils 4.4      Absolute Lymphocytes 2.0      Absolute Monocytes 1.0      Absolute Eosinophils 0.0      Absolute Basophils 0.0      Absolute Immature Granulocytes 0.1      Absolute NRBCs 0.0         Imaging   XR Chest 2 Views   Final Result   IMPRESSION: Subsegmental atelectasis is seen in the left lower lung.   Normal cardiomediastinal silhouette. Vascular calcifications are seen   within the thoracic aorta. Multilevel degenerative changes in the   spine. No acute bony abnormality.      RAAD MORATAYA MD            SYSTEM ID:  [id number]      XR Shoulder Right G/E 3 Views   Final Result   Impression:      1.  Right shoulder negative for fracture or joint malalignment.   Mild-moderate degenerative arthrosis of the glenohumeral joint. Mild   superior descending of the humeral head relative to the glenoid,   suggesting underlying rotator cuff laxity versus tearing. Mild   degenerative arthrosis at the acromioclavicular joint.       MIN PALAFOX MD            SYSTEM ID:  [id number]      Report per radiology.    EKG   ECG taken at 1105, ECG read at 1230  Normal sinus rhythm  Minimal voltage criteria for LVH, may be normal variant (Hany product)  Cannot rule out anterior infarct, age undetermined   Rate 69 bpm. NE interval 148 ms. QRS  duration 98 ms. QT/QTc 368/394 ms. P-R-T axes 18 3 52.  Read by: Rudy Caldwell MD    Independent Interpretation   Shoulder X-ray shows no fracture or dislocation  Chest X-ray shows no infiltrate    ED Course      Medications Administered   Medications - No data to display    Procedures   Procedures     Discussion of Management   None    ED Course   ED Course as of 08/02/24 1345   Fri Aug 02, 2024   0907 I obtained history and examined the patient as noted above.     1225 I re-evaluated patient. I explained findings to the patient and we discussed plan for discharge. The patient is comfortable with this plan.         Additional Documentation  None    Medical Decision Making / Diagnosis     CMS Diagnoses: None    MIPS       None    MDM   Kelle Abdalla is a 83-year-old female who is on day 5 of COVID presents with transient right shoulder pain this morning. At the time of occurrence, it did not seem to be affected by movement, possibly suggesting a non-MSK cause. Denies cardiac history and likewise denies family history of cardiac disease. She has history of VTE, on Eliquis for that. No chest pain or shortness of breath or hemoptysis today. Chest x-ray and shoulder x-ray shows no acute findings. Lab work shows a troponin of 18 that is flat on repeat. EKG is nonischemic. Symptoms resolved prior to presentation here which I find overall reassuring.  Upon reevaluation, the patient did describe a very transient episode of left-sided chest pain.  I am still reassured from a cardiac standpoint with the workup above, no ongoing chest pain at the time of disposition.  Overall suspect the patient's right-sided shoulder pain this morning may have been due to arthritis and correlation with the x-ray findings today.  Stable for discharge home and monitoring there, RTED for worsening symptoms or new emergent concerns such as persistent chest pain or shortness of breath.     Disposition   The patient was discharged.      Diagnosis     ICD-10-CM    1. Acute pain of right shoulder  M25.511       2. COVID-19 virus infection  U07.1       3. Chronic anticoagulation  Z79.01                  Scribe Disclosure:  I, Herson Gardner, am serving as a scribe at 9:39 AM on 8/2/2024 to document services personally performed by Rudy Caldwell MD, based on my observations and the provider's statements to me.        Rudy Caldwell MD  08/02/24 9023

## 2024-08-15 ENCOUNTER — APPOINTMENT (OUTPATIENT)
Dept: URBAN - METROPOLITAN AREA CLINIC 256 | Age: 84
Setting detail: DERMATOLOGY
End: 2024-08-17

## 2024-08-15 DIAGNOSIS — L60.8 OTHER NAIL DISORDERS: ICD-10-CM

## 2024-08-15 PROCEDURE — OTHER MIPS QUALITY: OTHER

## 2024-08-15 PROCEDURE — OTHER COUNSELING: OTHER

## 2024-08-15 PROCEDURE — OTHER PHOTO-DOCUMENTATION: OTHER

## 2024-08-15 PROCEDURE — OTHER ADDITIONAL NOTES: OTHER

## 2024-08-15 PROCEDURE — 99213 OFFICE O/P EST LOW 20 MIN: CPT

## 2024-08-15 NOTE — PROCEDURE: ADDITIONAL NOTES
Render Risk Assessment In Note?: no
Additional Notes: - Patient inquired about trimming all of her nails. We discussed with patient we do not have that service at our clinic. We recommend seeing a podiatrist or nail salon.\\n- Discussed with patient her nails will likely not improve with oral medication. No fungus seen on exam today. We gave her a letter that states patient does not have nail fungus so she can get her nails trimmed at a salon.
Detail Level: Simple

## 2024-08-15 NOTE — HPI: NAIL DISORDER
Is This A New Presentation, Or A Follow-Up?: Nail Disorder
Additional History: Patient states she had her nails trimmed 61 days ago at [facility]. She was on fluconazole, which she stopped 1 month ago, and was being prescribed by her PCP. Dr. Toth at [facility] states he only cuts them once and she will need to get them cut somewhere else. She presents today inquiring about getting all of her nails trimmed.\\nShe states that she had a culture done which did not show fungus, but mold.

## (undated) DEVICE — BLADE CLIPPER 4406

## (undated) DEVICE — ESU PENCIL W/HOLSTER E2350H

## (undated) DEVICE — DAVINCI HOT SHEARS TIP COVER  400180

## (undated) DEVICE — NDL INSUFFLATION 13GA 120MM C2201

## (undated) DEVICE — TUBING CONMED AIRSEAL SMOKE EVAC INSUFFLATION ASM-EVAC

## (undated) DEVICE — SPONGE PACK VAGINAL 2"X9

## (undated) DEVICE — DAVINCI DRAPE KIT 4-ARM 420291

## (undated) DEVICE — PACK DAVINCI GYN SMA15GDFS1

## (undated) DEVICE — DEVICE SUTURE GRASPER TROCAR CLOSURE 14GA PMITCSG

## (undated) DEVICE — ESU HOLDER LAP INST DISP PURPLE LONG 330MM H-PRO-330

## (undated) DEVICE — ESU GROUND PAD UNIVERSAL W/O CORD

## (undated) DEVICE — SU VICRYL 2-0 SH 27" J317H

## (undated) DEVICE — SU MONOCRYL 4-0 PS-2 18" UND Y496G

## (undated) DEVICE — DAVINCI OBTURATOR 8MM BLADELESS 420023

## (undated) DEVICE — SU PDS II 3-0 SH 27" Z316H

## (undated) DEVICE — SU DERMABOND ADVANCED .7ML DNX12

## (undated) DEVICE — ENDO TROCAR CONMED AIRSEAL BLADELESS 08X120MM IAS8-120LP

## (undated) DEVICE — DRAPE IOBAN INCISE 23X17" 6650EZ

## (undated) DEVICE — DRAPE MAYO STAND 23X54 8337

## (undated) DEVICE — ESU CORD MONOPOLAR 10'  E0510

## (undated) DEVICE — SU VICRYL 0 UR-6 27" J603H

## (undated) DEVICE — SU PROLENE 2-0 V-7 36" 8977H

## (undated) DEVICE — SU VICRYL 2-0 UR-6 27" J602H

## (undated) DEVICE — SUCTION CANISTER MEDIVAC LINER 3000ML W/LID 65651-530

## (undated) DEVICE — SU ETHIBOND 2-0 SHDA 30" X563H

## (undated) DEVICE — LINEN TOWEL PACK X5 5464

## (undated) DEVICE — KIT PATIENT POSITIONING PIGAZZI LATEX FREE 40580

## (undated) DEVICE — SUCTION IRR STRYKERFLOW II W/TIP 250-070-520

## (undated) DEVICE — GLOVE PROTEXIS MICRO 8.0  2D73PM80

## (undated) DEVICE — SU VICRYL 3-0 CT-1 36" J344H

## (undated) DEVICE — ENDO SHEARS 5MM 176643

## (undated) DEVICE — GLOVE PROTEXIS W/NEU-THERA 6.5  2D73TE65

## (undated) DEVICE — CATH TRAY FOLEY SURESTEP 16FR WDRAIN BAG STLK LATEX A300316A

## (undated) DEVICE — SOL NACL 0.9% INJ 1000ML BAG 2B1324X

## (undated) DEVICE — SU VICRYL 0 CT-2 27" J334H

## (undated) DEVICE — GLOVE PROTEXIS W/NEU-THERA 7.5  2D73TE75

## (undated) DEVICE — SOL WATER IRRIG 1000ML BOTTLE 2F7114

## (undated) DEVICE — GRASPER LAPAROSCOPIC EPIX 5MMX35CM C4130

## (undated) DEVICE — ENDO DISSECTOR BLUNT 05MM  BTD05

## (undated) DEVICE — ENDO TROCAR CONMED AIRSEAL BLADELESS 05X120MM IAS5-120LP

## (undated) DEVICE — SPONGE RAY-TEC 4X8" 7318

## (undated) DEVICE — GLOVE PROTEXIS BLUE W/NEU-THERA 7.0  2D73EB70

## (undated) DEVICE — TUBING IRRIG TUR Y TYPE 96" LF 6543-01

## (undated) DEVICE — ENDO POUCH UNIVERSAL RETRIEVAL SYSTEM INZII 5MM CD003

## (undated) DEVICE — SOL WATER IRRIG 3000ML BAG 2B7117

## (undated) DEVICE — SU VICRYL 2-0 CT-2 27" J333H

## (undated) DEVICE — DAVINCI S CANNULA SEAL 8.5-13MM 420206

## (undated) DEVICE — ESU HANDPIECE OLYMPUS PK SPATULA PK-SP0533

## (undated) RX ORDER — EPINEPHRINE 1 MG/ML
INJECTION, SOLUTION INTRAMUSCULAR; SUBCUTANEOUS
Status: DISPENSED
Start: 2018-10-19

## (undated) RX ORDER — HYDROMORPHONE HYDROCHLORIDE 1 MG/ML
INJECTION, SOLUTION INTRAMUSCULAR; INTRAVENOUS; SUBCUTANEOUS
Status: DISPENSED
Start: 2018-10-19

## (undated) RX ORDER — CEFAZOLIN SODIUM 1 G/3ML
INJECTION, POWDER, FOR SOLUTION INTRAMUSCULAR; INTRAVENOUS
Status: DISPENSED
Start: 2018-10-19

## (undated) RX ORDER — KETOROLAC TROMETHAMINE 15 MG/ML
INJECTION, SOLUTION INTRAMUSCULAR; INTRAVENOUS
Status: DISPENSED
Start: 2018-10-19

## (undated) RX ORDER — PROPOFOL 10 MG/ML
INJECTION, EMULSION INTRAVENOUS
Status: DISPENSED
Start: 2018-10-19

## (undated) RX ORDER — PHENAZOPYRIDINE HYDROCHLORIDE 200 MG/1
TABLET, FILM COATED ORAL
Status: DISPENSED
Start: 2018-10-19

## (undated) RX ORDER — MEPERIDINE HYDROCHLORIDE 25 MG/ML
INJECTION INTRAMUSCULAR; INTRAVENOUS; SUBCUTANEOUS
Status: DISPENSED
Start: 2018-10-19

## (undated) RX ORDER — FENTANYL CITRATE 50 UG/ML
INJECTION, SOLUTION INTRAMUSCULAR; INTRAVENOUS
Status: DISPENSED
Start: 2018-10-19

## (undated) RX ORDER — GLYCOPYRROLATE 0.2 MG/ML
INJECTION, SOLUTION INTRAMUSCULAR; INTRAVENOUS
Status: DISPENSED
Start: 2018-10-19

## (undated) RX ORDER — LIDOCAINE HYDROCHLORIDE 20 MG/ML
INJECTION, SOLUTION EPIDURAL; INFILTRATION; INTRACAUDAL; PERINEURAL
Status: DISPENSED
Start: 2018-10-19

## (undated) RX ORDER — ONDANSETRON 2 MG/ML
INJECTION INTRAMUSCULAR; INTRAVENOUS
Status: DISPENSED
Start: 2018-10-19

## (undated) RX ORDER — CEFAZOLIN SODIUM 2 G/100ML
INJECTION, SOLUTION INTRAVENOUS
Status: DISPENSED
Start: 2018-10-19

## (undated) RX ORDER — NEOSTIGMINE METHYLSULFATE 1 MG/ML
VIAL (ML) INJECTION
Status: DISPENSED
Start: 2018-10-19

## (undated) RX ORDER — BUPIVACAINE HYDROCHLORIDE 2.5 MG/ML
INJECTION, SOLUTION EPIDURAL; INFILTRATION; INTRACAUDAL
Status: DISPENSED
Start: 2018-10-19